# Patient Record
Sex: FEMALE | Race: WHITE | Employment: FULL TIME
[De-identification: names, ages, dates, MRNs, and addresses within clinical notes are randomized per-mention and may not be internally consistent; named-entity substitution may affect disease eponyms.]

---

## 2017-01-01 ENCOUNTER — PATIENT MESSAGE (OUTPATIENT)
Dept: INTERNAL MEDICINE | Facility: CLINIC | Age: 62
End: 2017-01-01

## 2017-01-03 ENCOUNTER — TELEPHONE (OUTPATIENT)
Dept: INTERNAL MEDICINE | Facility: CLINIC | Age: 62
End: 2017-01-03

## 2017-01-03 ENCOUNTER — PATIENT MESSAGE (OUTPATIENT)
Dept: INTERNAL MEDICINE | Facility: CLINIC | Age: 62
End: 2017-01-03

## 2017-01-09 ENCOUNTER — TELEPHONE (OUTPATIENT)
Dept: INTERNAL MEDICINE | Facility: CLINIC | Age: 62
End: 2017-01-09

## 2017-01-10 DIAGNOSIS — N30.00 ACUTE CYSTITIS WITHOUT HEMATURIA: Primary | ICD-10-CM

## 2017-01-15 ENCOUNTER — PATIENT MESSAGE (OUTPATIENT)
Dept: INTERNAL MEDICINE | Facility: CLINIC | Age: 62
End: 2017-01-15

## 2017-01-16 ENCOUNTER — TELEPHONE (OUTPATIENT)
Dept: INTERNAL MEDICINE | Facility: CLINIC | Age: 62
End: 2017-01-16

## 2017-01-16 ENCOUNTER — OFFICE VISIT (OUTPATIENT)
Dept: INTERNAL MEDICINE | Facility: CLINIC | Age: 62
End: 2017-01-16

## 2017-01-16 VITALS
SYSTOLIC BLOOD PRESSURE: 130 MMHG | OXYGEN SATURATION: 93 % | WEIGHT: 216.2 LBS | BODY MASS INDEX: 39.79 KG/M2 | HEIGHT: 62 IN | TEMPERATURE: 99.1 F | HEART RATE: 91 BPM | RESPIRATION RATE: 19 BRPM | DIASTOLIC BLOOD PRESSURE: 70 MMHG

## 2017-01-16 DIAGNOSIS — K58.9 IRRITABLE BOWEL SYNDROME, UNSPECIFIED TYPE: ICD-10-CM

## 2017-01-16 DIAGNOSIS — E78.00 HYPERCHOLESTEROLEMIA: ICD-10-CM

## 2017-01-16 DIAGNOSIS — J45.901: Primary | ICD-10-CM

## 2017-01-16 DIAGNOSIS — I10 ESSENTIAL HYPERTENSION: ICD-10-CM

## 2017-01-16 PROCEDURE — 99213 OFFICE O/P EST LOW 20 MIN: CPT | Performed by: FAMILY MEDICINE

## 2017-01-16 RX ORDER — BENZONATATE 100 MG/1
100 CAPSULE ORAL 3 TIMES DAILY PRN
Qty: 30 CAPSULE | Refills: 0 | Status: SHIPPED | OUTPATIENT
Start: 2017-01-16 | End: 2017-01-23

## 2017-01-16 RX ORDER — AZITHROMYCIN 250 MG/1
TABLET, FILM COATED ORAL
Qty: 1 PACKET | Refills: 0 | Status: SHIPPED | OUTPATIENT
Start: 2017-01-16 | End: 2017-08-07 | Stop reason: ALTCHOICE

## 2017-01-16 RX ORDER — PREDNISONE 20 MG/1
20 TABLET ORAL DAILY
Qty: 10 TABLET | Refills: 0 | Status: SHIPPED | OUTPATIENT
Start: 2017-01-16 | End: 2017-01-26

## 2017-01-16 ASSESSMENT — PATIENT HEALTH QUESTIONNAIRE - PHQ9
SUM OF ALL RESPONSES TO PHQ QUESTIONS 1-9: 0
2. FEELING DOWN, DEPRESSED OR HOPELESS: 0
1. LITTLE INTEREST OR PLEASURE IN DOING THINGS: 0
SUM OF ALL RESPONSES TO PHQ9 QUESTIONS 1 & 2: 0

## 2017-01-16 ASSESSMENT — ENCOUNTER SYMPTOMS
SHORTNESS OF BREATH: 1
SORE THROAT: 1
EYES NEGATIVE: 1
GASTROINTESTINAL NEGATIVE: 1
COUGH: 1

## 2017-01-19 ENCOUNTER — TELEPHONE (OUTPATIENT)
Dept: INTERNAL MEDICINE | Facility: CLINIC | Age: 62
End: 2017-01-19

## 2017-01-23 RX ORDER — ATENOLOL 50 MG/1
TABLET ORAL
Qty: 180 TABLET | Refills: 1 | Status: SHIPPED | OUTPATIENT
Start: 2017-01-23 | End: 2017-09-13 | Stop reason: SDUPTHER

## 2017-01-23 RX ORDER — ESCITALOPRAM OXALATE 10 MG/1
TABLET ORAL
Qty: 90 TABLET | Refills: 1 | Status: SHIPPED | OUTPATIENT
Start: 2017-01-23 | End: 2017-08-04 | Stop reason: SDUPTHER

## 2017-01-30 ENCOUNTER — TELEPHONE (OUTPATIENT)
Dept: INTERNAL MEDICINE | Facility: CLINIC | Age: 62
End: 2017-01-30

## 2017-01-30 DIAGNOSIS — N39.0 URINARY TRACT INFECTION, SITE UNSPECIFIED: Primary | ICD-10-CM

## 2017-01-30 RX ORDER — CIPROFLOXACIN 500 MG/1
500 TABLET, FILM COATED ORAL 2 TIMES DAILY
Qty: 14 TABLET | Refills: 0 | Status: SHIPPED | OUTPATIENT
Start: 2017-01-30 | End: 2017-02-06

## 2017-02-07 ENCOUNTER — TELEPHONE (OUTPATIENT)
Dept: INTERNAL MEDICINE | Facility: CLINIC | Age: 62
End: 2017-02-07

## 2017-02-07 DIAGNOSIS — N39.0 URINARY TRACT INFECTION WITHOUT HEMATURIA, SITE UNSPECIFIED: Primary | ICD-10-CM

## 2017-02-08 ENCOUNTER — TELEPHONE (OUTPATIENT)
Dept: INTERNAL MEDICINE | Facility: CLINIC | Age: 62
End: 2017-02-08

## 2017-02-13 ENCOUNTER — PATIENT MESSAGE (OUTPATIENT)
Dept: INTERNAL MEDICINE | Facility: CLINIC | Age: 62
End: 2017-02-13

## 2017-02-20 ENCOUNTER — TELEPHONE (OUTPATIENT)
Dept: INTERNAL MEDICINE | Facility: CLINIC | Age: 62
End: 2017-02-20

## 2017-02-24 ENCOUNTER — TELEPHONE (OUTPATIENT)
Dept: INTERNAL MEDICINE | Facility: CLINIC | Age: 62
End: 2017-02-24

## 2017-02-26 ENCOUNTER — PATIENT MESSAGE (OUTPATIENT)
Dept: INTERNAL MEDICINE | Facility: CLINIC | Age: 62
End: 2017-02-26

## 2017-03-06 ENCOUNTER — TELEPHONE (OUTPATIENT)
Dept: INTERNAL MEDICINE | Facility: CLINIC | Age: 62
End: 2017-03-06

## 2017-03-06 RX ORDER — GUAIFENESIN 600 MG/1
600 TABLET, EXTENDED RELEASE ORAL 2 TIMES DAILY
Qty: 30 TABLET | Refills: 0 | Status: SHIPPED | OUTPATIENT
Start: 2017-03-06 | End: 2017-08-07 | Stop reason: ALTCHOICE

## 2017-03-06 RX ORDER — MECLIZINE HCL 12.5 MG/1
12.5 TABLET ORAL 3 TIMES DAILY PRN
Qty: 15 TABLET | Refills: 0 | Status: SHIPPED | OUTPATIENT
Start: 2017-03-06 | End: 2017-03-16

## 2017-03-24 ENCOUNTER — HOSPITAL ENCOUNTER (OUTPATIENT)
Age: 62
Setting detail: SPECIMEN
Discharge: HOME | End: 2017-03-24
Payer: COMMERCIAL

## 2017-03-24 ENCOUNTER — TELEPHONE (OUTPATIENT)
Dept: INTERNAL MEDICINE CLINIC | Age: 62
End: 2017-03-24

## 2017-03-24 DIAGNOSIS — N30.00 ACUTE CYSTITIS WITHOUT HEMATURIA: ICD-10-CM

## 2017-03-24 DIAGNOSIS — N39.0 URINARY TRACT INFECTION WITHOUT HEMATURIA, SITE UNSPECIFIED: Primary | ICD-10-CM

## 2017-03-24 LAB
-: NORMAL
AMORPHOUS: NORMAL
BACTERIA: NORMAL
BILIRUBIN URINE: NEGATIVE
CASTS UA: NORMAL /LPF (ref 0–8)
COLOR: YELLOW
COMMENT UA: ABNORMAL
CRYSTALS, UA: NORMAL /HPF
EPITHELIAL CELLS UA: NORMAL /HPF (ref 0–5)
GLUCOSE URINE: NEGATIVE
KETONES, URINE: NEGATIVE
LEUKOCYTE ESTERASE, URINE: ABNORMAL
MUCUS: NORMAL
NITRITE, URINE: NEGATIVE
OTHER OBSERVATIONS UA: NORMAL
PH UA: 5.5 (ref 5–8)
PROTEIN UA: ABNORMAL
RBC UA: NORMAL /HPF (ref 0–4)
RENAL EPITHELIAL, UA: NORMAL /HPF
SPECIFIC GRAVITY UA: 1.02 (ref 1–1.03)
TRICHOMONAS: NORMAL
TURBIDITY: CLEAR
URINE HGB: ABNORMAL
UROBILINOGEN, URINE: NORMAL
WBC UA: NORMAL /HPF (ref 0–5)
YEAST: NORMAL

## 2017-03-25 ENCOUNTER — PATIENT MESSAGE (OUTPATIENT)
Dept: INTERNAL MEDICINE CLINIC | Age: 62
End: 2017-03-25

## 2017-03-25 LAB
CULTURE: NORMAL
CULTURE: NORMAL
Lab: NORMAL
SPECIMEN DESCRIPTION: NORMAL
STATUS: NORMAL

## 2017-05-10 ENCOUNTER — OFFICE VISIT (OUTPATIENT)
Dept: INTERNAL MEDICINE CLINIC | Age: 62
End: 2017-05-10
Payer: COMMERCIAL

## 2017-05-10 VITALS
HEART RATE: 78 BPM | DIASTOLIC BLOOD PRESSURE: 80 MMHG | BODY MASS INDEX: 40.3 KG/M2 | HEIGHT: 62 IN | WEIGHT: 219 LBS | TEMPERATURE: 97.7 F | SYSTOLIC BLOOD PRESSURE: 128 MMHG

## 2017-05-10 DIAGNOSIS — R07.81 RIB PAIN ON RIGHT SIDE: Primary | ICD-10-CM

## 2017-05-10 PROCEDURE — 99212 OFFICE O/P EST SF 10 MIN: CPT | Performed by: INTERNAL MEDICINE

## 2017-05-10 RX ORDER — PREDNISONE 10 MG/1
10 TABLET ORAL
Qty: 15 TABLET | Refills: 0 | Status: SHIPPED | OUTPATIENT
Start: 2017-05-10 | End: 2017-05-15

## 2017-05-10 RX ORDER — PREDNISONE 10 MG/1
10 TABLET ORAL
Qty: 15 TABLET | Refills: 0 | Status: SHIPPED | OUTPATIENT
Start: 2017-05-10 | End: 2017-05-10 | Stop reason: CLARIF

## 2017-05-11 ENCOUNTER — PATIENT MESSAGE (OUTPATIENT)
Dept: INTERNAL MEDICINE CLINIC | Age: 62
End: 2017-05-11

## 2017-05-11 ENCOUNTER — TELEPHONE (OUTPATIENT)
Dept: INTERNAL MEDICINE CLINIC | Age: 62
End: 2017-05-11

## 2017-05-11 RX ORDER — CYCLOBENZAPRINE HCL 10 MG
10 TABLET ORAL EVERY 8 HOURS PRN
Qty: 90 TABLET | Refills: 0 | Status: SHIPPED | OUTPATIENT
Start: 2017-05-11 | End: 2018-02-27 | Stop reason: SDUPTHER

## 2017-06-09 ENCOUNTER — TELEPHONE (OUTPATIENT)
Dept: INTERNAL MEDICINE CLINIC | Age: 62
End: 2017-06-09

## 2017-06-09 RX ORDER — CIPROFLOXACIN 500 MG/1
500 TABLET, FILM COATED ORAL 2 TIMES DAILY
Qty: 14 TABLET | Refills: 0 | Status: SHIPPED | OUTPATIENT
Start: 2017-06-09 | End: 2017-06-16

## 2017-06-12 RX ORDER — IBUPROFEN 800 MG/1
800 TABLET ORAL EVERY 8 HOURS PRN
Qty: 90 TABLET | Refills: 0 | Status: SHIPPED | OUTPATIENT
Start: 2017-06-12 | End: 2017-11-20 | Stop reason: SDUPTHER

## 2017-07-14 ENCOUNTER — TELEPHONE (OUTPATIENT)
Dept: INTERNAL MEDICINE CLINIC | Age: 62
End: 2017-07-14

## 2017-07-17 ENCOUNTER — TELEPHONE (OUTPATIENT)
Dept: INTERNAL MEDICINE CLINIC | Age: 62
End: 2017-07-17

## 2017-08-01 ENCOUNTER — PATIENT MESSAGE (OUTPATIENT)
Dept: INTERNAL MEDICINE CLINIC | Age: 62
End: 2017-08-01

## 2017-08-02 ENCOUNTER — TELEPHONE (OUTPATIENT)
Dept: INTERNAL MEDICINE CLINIC | Age: 62
End: 2017-08-02

## 2017-08-07 ENCOUNTER — OFFICE VISIT (OUTPATIENT)
Dept: OBGYN CLINIC | Age: 62
End: 2017-08-07
Payer: COMMERCIAL

## 2017-08-07 VITALS
DIASTOLIC BLOOD PRESSURE: 86 MMHG | SYSTOLIC BLOOD PRESSURE: 130 MMHG | BODY MASS INDEX: 39.51 KG/M2 | RESPIRATION RATE: 16 BRPM | WEIGHT: 223 LBS | HEART RATE: 76 BPM | HEIGHT: 63 IN | OXYGEN SATURATION: 100 %

## 2017-08-07 DIAGNOSIS — Z12.31 ENCOUNTER FOR SCREENING MAMMOGRAM FOR BREAST CANCER: ICD-10-CM

## 2017-08-07 DIAGNOSIS — Z01.419 WOMEN'S ANNUAL ROUTINE GYNECOLOGICAL EXAMINATION: Primary | ICD-10-CM

## 2017-08-07 PROCEDURE — 99396 PREV VISIT EST AGE 40-64: CPT | Performed by: OBSTETRICS & GYNECOLOGY

## 2017-08-07 RX ORDER — ESCITALOPRAM OXALATE 10 MG/1
TABLET ORAL
Qty: 90 TABLET | Refills: 0 | Status: SHIPPED | OUTPATIENT
Start: 2017-08-07 | End: 2017-11-28 | Stop reason: SDUPTHER

## 2017-09-05 ENCOUNTER — OFFICE VISIT (OUTPATIENT)
Dept: INTERNAL MEDICINE CLINIC | Age: 62
End: 2017-09-05
Payer: COMMERCIAL

## 2017-09-05 ENCOUNTER — HOSPITAL ENCOUNTER (OUTPATIENT)
Age: 62
Setting detail: SPECIMEN
Discharge: HOME | End: 2017-09-05
Payer: COMMERCIAL

## 2017-09-05 VITALS
SYSTOLIC BLOOD PRESSURE: 118 MMHG | TEMPERATURE: 98.1 F | HEIGHT: 63 IN | HEART RATE: 64 BPM | DIASTOLIC BLOOD PRESSURE: 80 MMHG | RESPIRATION RATE: 16 BRPM | WEIGHT: 222 LBS | BODY MASS INDEX: 39.34 KG/M2

## 2017-09-05 DIAGNOSIS — I10 ESSENTIAL HYPERTENSION: ICD-10-CM

## 2017-09-05 DIAGNOSIS — K21.9 GASTROESOPHAGEAL REFLUX DISEASE WITHOUT ESOPHAGITIS: ICD-10-CM

## 2017-09-05 DIAGNOSIS — N39.0 URINARY TRACT INFECTION WITHOUT HEMATURIA, SITE UNSPECIFIED: ICD-10-CM

## 2017-09-05 DIAGNOSIS — E04.9 THYROID GOITER: ICD-10-CM

## 2017-09-05 DIAGNOSIS — R05.9 COUGH: ICD-10-CM

## 2017-09-05 DIAGNOSIS — N39.0 URINARY TRACT INFECTION WITHOUT HEMATURIA, SITE UNSPECIFIED: Primary | ICD-10-CM

## 2017-09-05 PROCEDURE — 99214 OFFICE O/P EST MOD 30 MIN: CPT | Performed by: INTERNAL MEDICINE

## 2017-09-05 RX ORDER — PANTOPRAZOLE SODIUM 20 MG/1
20 TABLET, DELAYED RELEASE ORAL DAILY
COMMUNITY
End: 2018-01-24 | Stop reason: DRUGHIGH

## 2017-09-05 RX ORDER — FLUTICASONE PROPIONATE 50 MCG
2 SPRAY, SUSPENSION (ML) NASAL DAILY
Qty: 1 BOTTLE | Refills: 0 | Status: SHIPPED | OUTPATIENT
Start: 2017-09-05 | End: 2018-06-04 | Stop reason: CLARIF

## 2017-09-06 ENCOUNTER — TELEPHONE (OUTPATIENT)
Dept: INTERNAL MEDICINE CLINIC | Age: 62
End: 2017-09-06

## 2017-09-06 LAB
CULTURE: ABNORMAL
CULTURE: ABNORMAL
Lab: ABNORMAL
ORGANISM: ABNORMAL
SPECIMEN DESCRIPTION: ABNORMAL
STATUS: ABNORMAL

## 2017-09-06 RX ORDER — CIPROFLOXACIN 500 MG/1
500 TABLET, FILM COATED ORAL 2 TIMES DAILY
Qty: 14 TABLET | Refills: 0 | Status: SHIPPED | OUTPATIENT
Start: 2017-09-06 | End: 2017-09-13

## 2017-09-12 ENCOUNTER — OFFICE VISIT (OUTPATIENT)
Dept: INTERNAL MEDICINE CLINIC | Age: 62
End: 2017-09-12
Payer: COMMERCIAL

## 2017-09-12 VITALS
BODY MASS INDEX: 38.77 KG/M2 | SYSTOLIC BLOOD PRESSURE: 112 MMHG | WEIGHT: 218.8 LBS | HEART RATE: 72 BPM | HEIGHT: 63 IN | TEMPERATURE: 98 F | RESPIRATION RATE: 16 BRPM | DIASTOLIC BLOOD PRESSURE: 76 MMHG

## 2017-09-12 DIAGNOSIS — R59.0 AXILLARY LYMPHADENOPATHY: Primary | ICD-10-CM

## 2017-09-12 PROCEDURE — 99213 OFFICE O/P EST LOW 20 MIN: CPT | Performed by: INTERNAL MEDICINE

## 2017-09-12 RX ORDER — DOXYCYCLINE HYCLATE 100 MG
100 TABLET ORAL 2 TIMES DAILY
Qty: 20 TABLET | Refills: 0 | Status: SHIPPED | OUTPATIENT
Start: 2017-09-12 | End: 2017-09-22

## 2017-09-13 RX ORDER — ATENOLOL 50 MG/1
TABLET ORAL
Qty: 180 TABLET | Refills: 0 | Status: SHIPPED | OUTPATIENT
Start: 2017-09-13 | End: 2017-10-17 | Stop reason: CLARIF

## 2017-09-13 RX ORDER — ATENOLOL 50 MG/1
TABLET ORAL
Qty: 180 TABLET | Refills: 1 | Status: SHIPPED | OUTPATIENT
Start: 2017-09-13 | End: 2017-12-21 | Stop reason: SDUPTHER

## 2017-09-21 ENCOUNTER — TELEPHONE (OUTPATIENT)
Dept: INTERNAL MEDICINE CLINIC | Age: 62
End: 2017-09-21

## 2017-09-21 RX ORDER — PREDNISONE 10 MG/1
10 TABLET ORAL 3 TIMES DAILY
Qty: 15 TABLET | Refills: 0 | Status: SHIPPED | OUTPATIENT
Start: 2017-09-21 | End: 2017-09-26

## 2017-10-16 ENCOUNTER — HOSPITAL ENCOUNTER (OUTPATIENT)
Dept: MAMMOGRAPHY | Age: 62
Discharge: HOME | End: 2017-10-16
Payer: COMMERCIAL

## 2017-10-16 DIAGNOSIS — Z12.31 ENCOUNTER FOR SCREENING MAMMOGRAM FOR BREAST CANCER: ICD-10-CM

## 2017-10-16 DIAGNOSIS — Z01.419 WOMEN'S ANNUAL ROUTINE GYNECOLOGICAL EXAMINATION: ICD-10-CM

## 2017-10-16 PROCEDURE — 77063 BREAST TOMOSYNTHESIS BI: CPT

## 2017-10-17 ENCOUNTER — OFFICE VISIT (OUTPATIENT)
Dept: INTERNAL MEDICINE CLINIC | Age: 62
End: 2017-10-17
Payer: COMMERCIAL

## 2017-10-17 VITALS
HEIGHT: 63 IN | SYSTOLIC BLOOD PRESSURE: 138 MMHG | DIASTOLIC BLOOD PRESSURE: 70 MMHG | TEMPERATURE: 98.1 F | HEART RATE: 68 BPM | RESPIRATION RATE: 16 BRPM

## 2017-10-17 DIAGNOSIS — R59.0 AXILLARY LYMPHADENOPATHY: Primary | ICD-10-CM

## 2017-10-17 PROCEDURE — 99213 OFFICE O/P EST LOW 20 MIN: CPT | Performed by: INTERNAL MEDICINE

## 2017-10-17 NOTE — PROGRESS NOTES
Visit Information    Have you changed or started any medications since your last visit including any over-the-counter medicines, vitamins, or herbal medicines? no   Are you having any side effects from any of your medications? -  no  Have you stopped taking any of your medications? Is so, why? -  no    Have you seen any other physician or provider since your last visit? No  Have you had any other diagnostic tests since your last visit? Yes - Records Obtained  Have you been seen in the emergency room and/or had an admission to a hospital since we last saw you? No  Have you had your routine dental cleaning in the past 6 months? yes -     Have you activated your [employer] account? If not, what are your barriers?  Yes     Patient Care Team:  Hailee Flores MD as PCP - General    Medical History Review  Past Medical, Family, and Social History reviewed and does contribute to the patient presenting condition    Health Maintenance   Topic Date Due    Flu vaccine (1) 02/15/2018 (Originally 9/1/2017)    DTaP/Tdap/Td vaccine (1 - Tdap) 08/08/2018 (Originally 7/23/1975)    Pneumococcal med risk (1 of 1 - PPSV23) 08/18/2018 (Originally 7/23/1975)    Zostavax vaccine  09/05/2018 (Originally 7/23/2016)    HIV screen  09/05/2018 (Originally 7/23/1971)    Hepatitis C screen  12/12/2018 (Originally 1956)    Diabetes screen  09/02/2020 (Originally 9/3/2017)    Breast cancer screen  09/24/2018    Cervical cancer screen  01/21/2019    Lipid screen  12/30/2021    Colon cancer screen colonoscopy  08/18/2023

## 2017-10-21 NOTE — PROGRESS NOTES
Mikael Hughes is a 65 y.o. female who presents for   Chief Complaint   Patient presents with    Results     noticed lump in right axilla, had mammogram yesterday- results in EPIC    and follow up of chronic medical problems. Patient Active Problem List   Diagnosis    Tachycardia    HTN (hypertension)    Fibromyalgia    Chronic UTI    IBS (irritable bowel syndrome)    Obesity    Hypercholesterolemia    Chondromalacia patellae of right knee    Patellar contusion     HPI  Here for follow-up on the axillary lump and feels good  Current Outpatient Prescriptions   Medication Sig Dispense Refill    atenolol (TENORMIN) 50 MG tablet TAKE ONE TABLET BY MOUTH TWICE A  tablet 1    pantoprazole (PROTONIX) 20 MG tablet Take 20 mg by mouth daily      fluticasone (FLONASE) 50 MCG/ACT nasal spray 2 sprays by Nasal route daily 1 Bottle 0    escitalopram (LEXAPRO) 10 MG tablet TAKE 1 TABLET EVERY DAY 90 tablet 0    estradiol (ESTRING) 2 MG vaginal ring Place 2 mg vaginally once for 1 dose Follow package directions. 1 each 3    ibuprofen (ADVIL;MOTRIN) 800 MG tablet Take 1 tablet by mouth every 8 hours as needed for Pain 90 tablet 0    spironolactone (ALDACTONE) 50 MG tablet Take 50 mg by mouth daily        No current facility-administered medications for this visit.         Allergies   Allergen Reactions    Bactrim [Sulfamethoxazole-Trimethoprim] Hives    Macrobid [Nitrofurantoin] Other (See Comments)     Numb lips    Sulfa Antibiotics     Morphine Nausea And Vomiting       Past Medical History:   Diagnosis Date    Anxiety     Chronic UTI     Fibromyalgia     Hematuria due to chronic cystitis     HTN (hypertension)     IBS (irritable bowel syndrome)     Obesity     Tachycardia     Thyroid disease     nodules, hurshimoto disease    Unspecified sleep apnea     does not use machine       Past Surgical History:   Procedure Laterality Date    ABDOMEN SURGERY      abdominoplasty 2002    BREAST REDUCTION SURGERY      CHOLECYSTECTOMY      COLONOSCOPY      CYST REMOVAL Bilateral     feet    FOOT TENDON SURGERY Left 12/2/15    HYSTERECTOMY      with BSO    TONSILLECTOMY AND ADENOIDECTOMY         Family History   Problem Relation Age of Onset    Heart Disease Mother    [facility] Parkinsonism Mother     Hypertension Mother    [facility] Cancer Sister      cervical/cervical 80s    Hypertension Sister     High Blood Pressure Other     Hypertension Brother     Elevated Lipids Brother     Other Brother      stents     ROS   Constitutional:  Negative for fatigue, loss of appetite and unexpected weight change   HEENT            : Negative for neck stiffness and pain, no congestion or sinus pressure   Eyes                : No visual disturbance or pain   Cardiovascular: No chest pain or palpitations or leg swelling   Respiratory      : Negative for cough, shortness of breath or wheezing   Gastrointestinal: Negative for abdominal pain, constipation or diarrhea and bloating No nausea or vomiting   Genitourinary:     No urgency or frequency, no burning or hematuria   Musculoskeletal: No arthralgias, back pain or myalgias   Skin                  : Negative for rash or erythema   Neurological    : Negative for dizziness, weakness, tremors ,light headedness or syncope   Psychiatric       : Negative for dysphoric mood, sleep disturbances, nervous or anxious, or decreased concentration   All other review of systems was negative    Objective  Physical Examination:    Nursing note reviewed    /70 (Site: Left Arm, Position: Sitting, Cuff Size: Large Adult)   Pulse 68   Temp 98.1 °F (36.7 °C) (Oral)   Resp 16   Ht 5' 2.5\" (1.588 m)   BP Readings from Last 3 Encounters:   10/17/17 138/70   09/12/17 112/76   09/05/17 118/80         Constitutional:  Elida Klinefelter is oriented to place, person and time ,appears well-developed and well-nourished  HEENT:  Atraumatic and normocephalic, external ears normal bilaterally,

## 2017-10-29 ENCOUNTER — PATIENT MESSAGE (OUTPATIENT)
Dept: INTERNAL MEDICINE CLINIC | Age: 62
End: 2017-10-29

## 2017-10-30 ENCOUNTER — TELEPHONE (OUTPATIENT)
Dept: INTERNAL MEDICINE CLINIC | Age: 62
End: 2017-10-30

## 2017-10-30 RX ORDER — PREDNISONE 10 MG/1
10 TABLET ORAL 3 TIMES DAILY
Qty: 15 TABLET | Refills: 0 | Status: SHIPPED | OUTPATIENT
Start: 2017-10-30 | End: 2017-11-09

## 2017-10-30 NOTE — TELEPHONE ENCOUNTER
Doctor K,   My Larslilia Quachmitz has returned. Right side from buttocks down to foot. Using ibprophen but not working. .   Any suggestions for this nerve pain? At times it over baring.    Dawna Rendon

## 2017-11-27 ENCOUNTER — PATIENT MESSAGE (OUTPATIENT)
Dept: INTERNAL MEDICINE CLINIC | Age: 62
End: 2017-11-27

## 2017-11-29 RX ORDER — ESCITALOPRAM OXALATE 10 MG/1
TABLET ORAL
Qty: 90 TABLET | Refills: 0 | Status: SHIPPED | OUTPATIENT
Start: 2017-11-29 | End: 2018-03-17 | Stop reason: SDUPTHER

## 2017-12-21 ENCOUNTER — OFFICE VISIT (OUTPATIENT)
Dept: INTERNAL MEDICINE CLINIC | Age: 62
End: 2017-12-21
Payer: COMMERCIAL

## 2017-12-21 VITALS
HEART RATE: 81 BPM | HEIGHT: 63 IN | OXYGEN SATURATION: 97 % | BODY MASS INDEX: 40.04 KG/M2 | RESPIRATION RATE: 15 BRPM | TEMPERATURE: 97.4 F | WEIGHT: 226 LBS | DIASTOLIC BLOOD PRESSURE: 85 MMHG | SYSTOLIC BLOOD PRESSURE: 130 MMHG

## 2017-12-21 DIAGNOSIS — M54.6 ACUTE RIGHT-SIDED THORACIC BACK PAIN: Primary | ICD-10-CM

## 2017-12-21 PROCEDURE — 99213 OFFICE O/P EST LOW 20 MIN: CPT | Performed by: INTERNAL MEDICINE

## 2017-12-21 RX ORDER — PREDNISONE 10 MG/1
10 TABLET ORAL
Qty: 15 TABLET | Refills: 0 | Status: SHIPPED | OUTPATIENT
Start: 2017-12-21 | End: 2017-12-26

## 2017-12-21 RX ORDER — ATENOLOL 50 MG/1
TABLET ORAL
Qty: 180 TABLET | Refills: 1 | Status: SHIPPED | OUTPATIENT
Start: 2017-12-21 | End: 2018-07-03 | Stop reason: SDUPTHER

## 2017-12-21 RX ORDER — OMEPRAZOLE 20 MG/1
20 CAPSULE, DELAYED RELEASE ORAL DAILY
COMMUNITY
End: 2018-06-04 | Stop reason: CLARIF

## 2017-12-21 NOTE — PROGRESS NOTES
No results found for: INR, PROTIME  Lab Results   Component Value Date    GLUCOSE 146 (H) 12/30/2016    CREATININE 0.97 (H) 12/30/2016    BUN 28 (H) 12/30/2016     12/30/2016    K 4.4 12/30/2016     12/30/2016    CO2 26 12/30/2016     Lab Results   Component Value Date    ALT 49 (H) 12/30/2016    AST 32 (H) 12/30/2016    ALKPHOS 58 12/30/2016    BILITOT 0.51 12/30/2016     Lab Results   Component Value Date    LABPROT 7.5 11/28/2012    LABALBU 4.4 12/30/2016     Lab Results   Component Value Date    TSH 0.77 06/10/2016     Assessment:  1. Acute right-sided thoracic back pain        Plan:  Prednisone 10 mg 3 times a day given for 5 days  Advised to apply warm compresses  Continue muscle relaxers as before  Call back next week if no improvement           1. [employer] received counseling on the following healthy behaviors: nutrition and exercise    2. Prior labs and health maintenance reviewed. 3.  Discussed use, benefit, and side effects of prescribed medications. Barriers to medication compliance addressed. All her questions were answered. Pt voiced understanding. Kusum Sen will continue current medications, diet and exercise. Orders Placed This Encounter   Medications    atenolol (TENORMIN) 50 MG tablet     Sig: TAKE ONE TABLET BY MOUTH TWICE A DAY     Dispense:  180 tablet     Refill:  1    predniSONE (DELTASONE) 10 MG tablet     Sig: Take 1 tablet by mouth 3 times daily (with meals) for 5 days     Dispense:  15 tablet     Refill:  0          Completed Refills               Requested Prescriptions     Signed Prescriptions Disp Refills    atenolol (TENORMIN) 50 MG tablet 180 tablet 1     Sig: TAKE ONE TABLET BY MOUTH TWICE A DAY    predniSONE (DELTASONE) 10 MG tablet 15 tablet 0     Sig: Take 1 tablet by mouth 3 times daily (with meals) for 5 days     4. Patient given educational materials - see patient instructions    5.  Was a self-tracking handout given in paper form or via MyChart? NO    No orders of the defined types were placed in this encounter. No Follow-up on file. Patient voiced understanding and agreed to treatment plan. Electronically signed by Gokul Salazar MD on 12/21/2017 at 4:09 PM    This note is created with a voice recognition program and while intend to generate a document that accurately reflects the content of the visit, no guarantee can be provided that every mistake has been identified and corrected by editing.

## 2017-12-21 NOTE — PROGRESS NOTES
Chronic Disease Visit Information    BP Readings from Last 3 Encounters:   10/17/17 138/70   09/12/17 112/76   09/05/17 118/80          Hemoglobin A1C (%)   Date Value   09/03/2014 5.4     LDL Cholesterol (mg/dL)   Date Value   12/30/2016 116     HDL (mg/dL)   Date Value   12/30/2016 43     BUN (mg/dL)   Date Value   12/30/2016 28 (H)     CREATININE (mg/dL)   Date Value   12/30/2016 0.97 (H)     Glucose (mg/dL)   Date Value   12/30/2016 146 (H)   06/02/2012 96            Have you changed or started any medications since your last visit including any over-the-counter medicines, vitamins, or herbal medicines? no   Are you having any side effects from any of your medications? -  no  Have you stopped taking any of your medications? Is so, why? -  no    Have you seen any other physician or provider since your last visit? Yes -   Have you had any other diagnostic tests since your last visit? No  Have you been seen in the emergency room and/or had an admission to a hospital since we last saw you? No  Have you had your annual diabetic retinal (eye) exam? No  Have you had your routine dental cleaning in the past 6 months? yes  Have you activated your [employer] account? If not, what are your barriers?  Yes     Patient Care Team:  Cricket Sanchez MD as PCP - General         Medical History Review  Past Medical, Family, and Social History reviewed and does contribute to the patient presenting condition    Health Maintenance   Topic Date Due    Flu vaccine (1) 02/15/2018 (Originally 9/1/2017)    DTaP/Tdap/Td vaccine (1 - Tdap) 08/08/2018 (Originally 7/23/1975)    Pneumococcal med risk (1 of 1 - PPSV23) 08/18/2018 (Originally 7/23/1975)    Zostavax vaccine  09/05/2018 (Originally 7/23/2016)    HIV screen  09/05/2018 (Originally 7/23/1971)    Hepatitis C screen  12/12/2018 (Originally 1956)    Diabetes screen  09/02/2020 (Originally 9/3/2017)    Cervical cancer screen  01/21/2019    Breast cancer screen  10/16/2019  Lipid screen  12/30/2021    Colon cancer screen colonoscopy  08/18/2023

## 2017-12-22 ENCOUNTER — TELEPHONE (OUTPATIENT)
Dept: INTERNAL MEDICINE CLINIC | Age: 62
End: 2017-12-22

## 2017-12-22 DIAGNOSIS — M54.6 ACUTE RIGHT-SIDED THORACIC BACK PAIN: Primary | ICD-10-CM

## 2017-12-23 ENCOUNTER — HOSPITAL ENCOUNTER (OUTPATIENT)
Age: 62
Discharge: HOME | End: 2017-12-23
Payer: COMMERCIAL

## 2017-12-23 ENCOUNTER — HOSPITAL ENCOUNTER (OUTPATIENT)
Dept: GENERAL RADIOLOGY | Age: 62
Discharge: HOME | End: 2017-12-23
Payer: COMMERCIAL

## 2017-12-23 DIAGNOSIS — M54.6 ACUTE RIGHT-SIDED THORACIC BACK PAIN: ICD-10-CM

## 2017-12-23 PROCEDURE — 72070 X-RAY EXAM THORAC SPINE 2VWS: CPT

## 2017-12-23 PROCEDURE — 71020 XR CHEST STANDARD TWO VW: CPT

## 2017-12-26 ENCOUNTER — PATIENT MESSAGE (OUTPATIENT)
Dept: INTERNAL MEDICINE CLINIC | Age: 62
End: 2017-12-26

## 2017-12-26 ENCOUNTER — TELEPHONE (OUTPATIENT)
Dept: INTERNAL MEDICINE CLINIC | Age: 62
End: 2017-12-26

## 2017-12-26 DIAGNOSIS — M54.6 PAIN IN THORACIC SPINE: Primary | ICD-10-CM

## 2017-12-26 DIAGNOSIS — M54.6 ACUTE RIGHT-SIDED THORACIC BACK PAIN: Primary | ICD-10-CM

## 2017-12-26 RX ORDER — HYDROCODONE BITARTRATE AND ACETAMINOPHEN 5; 325 MG/1; MG/1
1 TABLET ORAL EVERY 6 HOURS PRN
Qty: 28 TABLET | Refills: 0 | Status: SHIPPED | OUTPATIENT
Start: 2017-12-26 | End: 2022-02-07 | Stop reason: SDUPTHER

## 2017-12-26 NOTE — TELEPHONE ENCOUNTER
CT of the chest ordered to evaluate for upper back pain  Also Norco ordered and patient has taken them in the past  Advise patient to go to the emergency room with symptoms not any better

## 2017-12-26 NOTE — TELEPHONE ENCOUNTER
Chest x-ray was normal  Thoracic spine x-ray shows degenerative disease is no acute fracture  Is patient feeling better

## 2017-12-27 NOTE — TELEPHONE ENCOUNTER
From: Bailey Sewell  To: Ramya Murray MD  Sent: 12/26/2017 6:18 PM EST  Subject: Non-Urgent Medical Question    I can not take the contrast with my cat scan on Friday. I have a reaction to that dye. Can you please revise.

## 2017-12-29 ENCOUNTER — TELEPHONE (OUTPATIENT)
Dept: INTERNAL MEDICINE CLINIC | Age: 62
End: 2017-12-29

## 2017-12-29 ENCOUNTER — HOSPITAL ENCOUNTER (OUTPATIENT)
Dept: CT IMAGING | Age: 62
Discharge: HOME | End: 2017-12-29
Payer: COMMERCIAL

## 2017-12-29 DIAGNOSIS — M54.6 ACUTE RIGHT-SIDED THORACIC BACK PAIN: ICD-10-CM

## 2017-12-29 RX ORDER — AMOXICILLIN 500 MG/1
500 CAPSULE ORAL 3 TIMES DAILY
Qty: 21 CAPSULE | Refills: 0 | Status: SHIPPED | OUTPATIENT
Start: 2017-12-29 | End: 2018-01-05

## 2018-01-04 ENCOUNTER — HOSPITAL ENCOUNTER (OUTPATIENT)
Dept: CT IMAGING | Age: 63
Discharge: HOME | End: 2018-01-04
Payer: COMMERCIAL

## 2018-01-04 DIAGNOSIS — M54.6 PAIN IN THORACIC SPINE: ICD-10-CM

## 2018-01-04 PROCEDURE — 71250 CT THORAX DX C-: CPT

## 2018-01-11 ENCOUNTER — TELEPHONE (OUTPATIENT)
Dept: INTERNAL MEDICINE CLINIC | Age: 63
End: 2018-01-11

## 2018-01-11 RX ORDER — AZITHROMYCIN 250 MG/1
TABLET, FILM COATED ORAL
Qty: 1 PACKET | Refills: 0 | Status: SHIPPED | OUTPATIENT
Start: 2018-01-11 | End: 2018-01-24 | Stop reason: ALTCHOICE

## 2018-01-19 ENCOUNTER — PATIENT MESSAGE (OUTPATIENT)
Dept: INTERNAL MEDICINE CLINIC | Age: 63
End: 2018-01-19

## 2018-01-22 NOTE — TELEPHONE ENCOUNTER
From: Kit Casillas  To: Shiela Vázquez MD  Sent: 1/19/2018 8:00 PM EST  Subject: Non-Urgent Medical Question    Dr. Lissett Borrego,   I know this flu and Upper respiratory infections are wide spread. However, I have been on two cycles of antibiotics. Went through two pkg of mucinex dm, one pkg of Alkaselzter cold and cough. Even tried those pearly cough meds you gave me. It's been 4 weeks and I am still coughing. Very little sinus drainage which is clear. I need to do something about this cough which feels all bronchial. How about a nebulizer? Please help ? ?

## 2018-01-24 ENCOUNTER — OFFICE VISIT (OUTPATIENT)
Dept: INTERNAL MEDICINE CLINIC | Age: 63
End: 2018-01-24
Payer: COMMERCIAL

## 2018-01-24 VITALS
BODY MASS INDEX: 38.98 KG/M2 | HEART RATE: 76 BPM | RESPIRATION RATE: 15 BRPM | OXYGEN SATURATION: 91 % | HEIGHT: 63 IN | TEMPERATURE: 98.1 F | SYSTOLIC BLOOD PRESSURE: 126 MMHG | WEIGHT: 220 LBS | DIASTOLIC BLOOD PRESSURE: 74 MMHG

## 2018-01-24 DIAGNOSIS — J40 BRONCHITIS: Primary | ICD-10-CM

## 2018-01-24 PROCEDURE — 94640 AIRWAY INHALATION TREATMENT: CPT | Performed by: INTERNAL MEDICINE

## 2018-01-24 PROCEDURE — 96372 THER/PROPH/DIAG INJ SC/IM: CPT | Performed by: INTERNAL MEDICINE

## 2018-01-24 PROCEDURE — 99213 OFFICE O/P EST LOW 20 MIN: CPT | Performed by: INTERNAL MEDICINE

## 2018-01-24 RX ORDER — ALBUTEROL SULFATE 90 UG/1
2 AEROSOL, METERED RESPIRATORY (INHALATION) EVERY 6 HOURS PRN
Qty: 1 INHALER | Refills: 0 | Status: SHIPPED | OUTPATIENT
Start: 2018-01-24 | End: 2018-06-04 | Stop reason: CLARIF

## 2018-01-24 RX ORDER — PREDNISONE 10 MG/1
10 TABLET ORAL
Qty: 15 TABLET | Refills: 0 | Status: SHIPPED | OUTPATIENT
Start: 2018-01-24 | End: 2018-01-29

## 2018-01-24 RX ORDER — ALBUTEROL SULFATE 2.5 MG/3ML
2.5 SOLUTION RESPIRATORY (INHALATION) ONCE
Status: COMPLETED | OUTPATIENT
Start: 2018-01-24 | End: 2018-01-24

## 2018-01-24 RX ORDER — CEFTRIAXONE 1 G/1
1 INJECTION, POWDER, FOR SOLUTION INTRAMUSCULAR; INTRAVENOUS ONCE
Status: COMPLETED | OUTPATIENT
Start: 2018-01-24 | End: 2018-01-24

## 2018-01-24 RX ORDER — CEFUROXIME AXETIL 500 MG/1
500 TABLET ORAL 2 TIMES DAILY
Qty: 20 TABLET | Refills: 0 | Status: SHIPPED | OUTPATIENT
Start: 2018-01-24 | End: 2018-02-03

## 2018-01-24 RX ORDER — LIDOCAINE HYDROCHLORIDE 10 MG/ML
2 INJECTION, SOLUTION EPIDURAL; INFILTRATION; INTRACAUDAL; PERINEURAL ONCE
Status: COMPLETED | OUTPATIENT
Start: 2018-01-24 | End: 2018-01-24

## 2018-01-24 RX ADMIN — CEFTRIAXONE 1 G: 1 INJECTION, POWDER, FOR SOLUTION INTRAMUSCULAR; INTRAVENOUS at 14:48

## 2018-01-24 RX ADMIN — LIDOCAINE HYDROCHLORIDE 2 ML: 10 INJECTION, SOLUTION EPIDURAL; INFILTRATION; INTRACAUDAL; PERINEURAL at 16:57

## 2018-01-24 RX ADMIN — ALBUTEROL SULFATE 2.5 MG: 2.5 SOLUTION RESPIRATORY (INHALATION) at 14:11

## 2018-01-24 ASSESSMENT — PATIENT HEALTH QUESTIONNAIRE - PHQ9
2. FEELING DOWN, DEPRESSED OR HOPELESS: 0
1. LITTLE INTEREST OR PLEASURE IN DOING THINGS: 0
SUM OF ALL RESPONSES TO PHQ9 QUESTIONS 1 & 2: 0
SUM OF ALL RESPONSES TO PHQ QUESTIONS 1-9: 0

## 2018-01-24 NOTE — PROGRESS NOTES
negative    Objective  Physical Examination:    Nursing note reviewed    /74 (Site: Left Arm, Position: Sitting, Cuff Size: Large Adult)   Pulse 76   Temp 98.1 °F (36.7 °C) (Oral)   Resp 15   Ht 5' 2.99\" (1.6 m)   Wt 220 lb (99.8 kg)   SpO2 91%   Breastfeeding?  No   BMI 38.98 kg/m²   BP Readings from Last 3 Encounters:   01/24/18 126/74   12/21/17 130/85   10/17/17 138/70         Constitutional:  Jamee Rivero is oriented to place, person and time ,appears well-developed and well-nourished  HEENT:  Atraumatic and normocephalic, external ears normal bilaterally, nose normal no oropharyngeal exudate and is clear and moist  Eyes:  EOCM normal; conjunctivae normal; PERRLA bilaterally  Neck:  Normal range of motion, neck supple, no JVD and no thyromegaly  Cardiovascular:  RRR, normal heart sounds and intact distal pulses  Pulmonary:  effort normal and breath sounds normal bilaterally,Positive for wheezes or rales on the right side, no respiratory distress  Abdominal:  Soft, non-tender; normal bowel sounds, no masses  Musculoskeletal:  Normal range of motion and no edema or tenderness bilaterally  No lymphadenopathy  Neurological:  alert, oriented, and normal reflexes bilaterally  Skin: warm and dry  Psychiatric:  normal mood and effect; behavior normal.    Labs:   Lab Results   Component Value Date    LABA1C 5.4 09/03/2014     Lab Results   Component Value Date    CHOL 204 (H) 12/30/2016     Lab Results   Component Value Date    HDL 43 12/30/2016     No results found for: [address]  Lab Results   Component Value Date    TRIG 226 (H) 12/30/2016     No components found for: [place], [place]  Lab Results   Component Value Date    WBC 11.2 (H) 09/19/2016    HGB 13.2 09/19/2016    HCT 40.3 09/19/2016    MCV 85.6 09/19/2016     09/19/2016     No results found for: INR, PROTIME  Lab Results   Component Value Date    GLUCOSE 146 (H) 12/30/2016    CREATININE 0.97 (H) 12/30/2016    BUN 28 (H) 12/30/2016     12/30/2016 editing.

## 2018-02-05 ENCOUNTER — TELEPHONE (OUTPATIENT)
Dept: INTERNAL MEDICINE CLINIC | Age: 63
End: 2018-02-05

## 2018-02-05 NOTE — TELEPHONE ENCOUNTER
Pt is calling stating she is feeling better however her cough is still very deep.      States she still has inhaler left and its  Been helping but she cannot get rid of the deep cough she has    Pt states she does NOT want tessalon pearles

## 2018-02-14 ENCOUNTER — TELEPHONE (OUTPATIENT)
Dept: INTERNAL MEDICINE CLINIC | Age: 63
End: 2018-02-14

## 2018-02-14 ENCOUNTER — PATIENT MESSAGE (OUTPATIENT)
Dept: INTERNAL MEDICINE CLINIC | Age: 63
End: 2018-02-14

## 2018-02-14 DIAGNOSIS — N39.0 URINARY TRACT INFECTION WITHOUT HEMATURIA, SITE UNSPECIFIED: Primary | ICD-10-CM

## 2018-02-15 ENCOUNTER — TELEPHONE (OUTPATIENT)
Dept: INTERNAL MEDICINE CLINIC | Age: 63
End: 2018-02-15

## 2018-02-15 RX ORDER — CIPROFLOXACIN 500 MG/1
500 TABLET, FILM COATED ORAL 2 TIMES DAILY
Qty: 14 TABLET | Refills: 0 | Status: SHIPPED | OUTPATIENT
Start: 2018-02-15 | End: 2018-02-25

## 2018-02-16 ENCOUNTER — HOSPITAL ENCOUNTER (OUTPATIENT)
Age: 63
Setting detail: SPECIMEN
Discharge: HOME | End: 2018-02-16
Payer: COMMERCIAL

## 2018-02-16 LAB
-: ABNORMAL
AMORPHOUS: ABNORMAL
BACTERIA: ABNORMAL
BILIRUBIN URINE: NEGATIVE
CASTS UA: ABNORMAL /LPF (ref 0–2)
COLOR: YELLOW
COMMENT UA: ABNORMAL
CRYSTALS, UA: ABNORMAL /HPF
EPITHELIAL CELLS UA: ABNORMAL /HPF (ref 0–5)
GLUCOSE URINE: NEGATIVE
KETONES, URINE: ABNORMAL
LEUKOCYTE ESTERASE, URINE: ABNORMAL
MUCUS: ABNORMAL
NITRITE, URINE: NEGATIVE
OTHER OBSERVATIONS UA: ABNORMAL
PH UA: 5.5 (ref 5–8)
PROTEIN UA: ABNORMAL
RBC UA: ABNORMAL /HPF (ref 0–2)
RENAL EPITHELIAL, UA: ABNORMAL /HPF
SPECIFIC GRAVITY UA: 1.02 (ref 1–1.03)
TRICHOMONAS: ABNORMAL
TURBIDITY: ABNORMAL
URINE HGB: ABNORMAL
UROBILINOGEN, URINE: NORMAL
WBC UA: ABNORMAL /HPF (ref 0–5)
YEAST: ABNORMAL

## 2018-02-17 LAB
CULTURE: NO GROWTH
CULTURE: NORMAL
Lab: NORMAL
SPECIMEN DESCRIPTION: NORMAL
STATUS: NORMAL

## 2018-02-27 RX ORDER — CYCLOBENZAPRINE HCL 10 MG
10 TABLET ORAL EVERY 8 HOURS PRN
Qty: 90 TABLET | Refills: 0 | Status: SHIPPED | OUTPATIENT
Start: 2018-02-27 | End: 2018-12-14 | Stop reason: SDUPTHER

## 2018-03-13 ENCOUNTER — TELEPHONE (OUTPATIENT)
Dept: INTERNAL MEDICINE CLINIC | Age: 63
End: 2018-03-13

## 2018-03-13 ENCOUNTER — PATIENT MESSAGE (OUTPATIENT)
Dept: INTERNAL MEDICINE CLINIC | Age: 63
End: 2018-03-13

## 2018-03-13 RX ORDER — PREDNISONE 10 MG/1
10 TABLET ORAL 3 TIMES DAILY
Qty: 15 TABLET | Refills: 0 | Status: SHIPPED | OUTPATIENT
Start: 2018-03-13 | End: 2018-03-18

## 2018-03-13 NOTE — TELEPHONE ENCOUNTER
Pt notified     mucinexc 600mg bid and prednisone 10mg TID for 5 days    Sent to [facility] as directed Dr Michael Woods    Pt said the mucous was reaaly green when she did cough some up today

## 2018-03-14 ENCOUNTER — TELEPHONE (OUTPATIENT)
Dept: INTERNAL MEDICINE CLINIC | Age: 63
End: 2018-03-14

## 2018-03-14 RX ORDER — AZITHROMYCIN 250 MG/1
TABLET, FILM COATED ORAL
Qty: 1 PACKET | Refills: 0 | Status: SHIPPED | OUTPATIENT
Start: 2018-03-14 | End: 2018-03-24

## 2018-03-14 NOTE — TELEPHONE ENCOUNTER
After Visit Summary   3/14/2018    Dung Norton    MRN: [id number]           Patient Information     Date Of Birth          1942        Visit Information        Provider Department      3/14/2018 11:15 AM Tamiko Vanegas MD [facility] and [facility]rolog[facility]        Care Instructions    Please follow up in 6 weeks with a flow/PVR  Please come to this appointment with a full bladder for a flow/PVR    It was a pleasure meeting with you today.  Thank you for allowing me and my team the privilege of caring for you today.  YOU are the reason we are here, and I truly hope we provided you with the excellent service you deserve.  Please let us know if there is anything else we can do for you so that we can be sure you are leaving completely satisfied with your care experience.                  Follow-ups after your visit        Your next 10 appointments already scheduled     Mar 19, 2018 10:15 AM CDT   [facility] with  [facility]    [address] [place] [id number] [phone]           Please do not eat 10-12 hours before your appointment if you are coming in fasting for labs on lipids, cholesterol, or glucose (sugar). This does not apply to pregnant women. Water, hot tea and black coffee (with nothing added) are okay. Do not drink other fluids, diet soda or chew gum.            Apr 13, 2018  1:00 PM CDT   (Arrive by 12:30 PM)   Return Visit with Oralia De La Torre NP   [facility] Nephrology ([facility])    [address]  [address]  [facility] [id number] [phone]            Apr 27, 2018 11:00 AM CDT   (Arrive by 10:45 AM)   Return Visit with Tamiko Vanegas MD   [facility] and [facility]rolog[facility] ([facility])    [address]  [address]  [facility] [id number] [phone]              Who to contact  Health Maintenance   Topic Date Due    Shingles Vaccine (1 of 2 - 2 Dose Series) 07/23/2006    Flu vaccine (1) 09/01/2017    Potassium monitoring  12/30/2017    Creatinine monitoring  12/30/2017    DTaP/Tdap/Td vaccine (1 - Tdap) 08/08/2018 (Originally 7/23/1975)    HIV screen  09/05/2018 (Originally 7/23/1971)    Hepatitis C screen  12/12/2018 (Originally 1956)    Diabetes screen  09/02/2020 (Originally 9/3/2017)    Cervical cancer screen  01/21/2019    Breast cancer screen  10/16/2019    Lipid screen  12/30/2021    Colon cancer screen colonoscopy  08/18/2023             (applicable per patient's age: Cancer Screenings, Depression Screening, Fall Risk Screening, Immunizations)    Hemoglobin A1C (%)   Date Value   09/03/2014 5.4     LDL Cholesterol (mg/dL)   Date Value   12/30/2016 116     AST (U/L)   Date Value   12/30/2016 32 (H)     ALT (U/L)   Date Value   12/30/2016 49 (H)     BUN (mg/dL)   Date Value   12/30/2016 28 (H)      (goal A1C is < 7)   (goal LDL is <100) need 30-50% reduction from baseline     BP Readings from Last 3 Encounters:   01/24/18 126/74   12/21/17 130/85   10/17/17 138/70    (goal /80)      All Future Testing planned in [facility]:  Lab Frequency Next Occurrence       Next Visit Date:  No future appointments.          Patient Active Problem List:     Tachycardia     HTN (hypertension)     Fibromyalgia     Chronic UTI     IBS (irritable bowel syndrome)     Obesity     Hypercholesterolemia     Chondromalacia patellae of right knee     Patellar contusion     Please call your clinic at [phone] to:    Ask questions about your health    Make or cancel appointments    Discuss your medicines    Learn about your test results    Speak to your doctor            Additional Information About Your Visit        MyChart Information     Narvii is an electronic gateway that provides easy, online access to your medical records. With Narvii, you can request a clinic appointment, read your test results, renew a prescription or communicate with your care team.     To sign up for Narvii visit the website at [email or web address]   You will be asked to enter the access code listed below, as well as some personal information. Please follow the directions to create your username and password.     Your access code is: [email or web address]  Expires: 4/15/2018 12:20 PM     Your access code will  in 90 days. If you need help or a new code, please contact your [facility] or call [phone] for assistance.        Care EveryWhere ID     This is your Care EveryWhere ID. This could be used by other organizations to access your [place] medical records  [id number]        Your Vitals Were     Pulse Respirations BMI (Body Mass Index)             79 16 24.54 kg/m2          Blood Pressure from Last 3 Encounters:   18 151/89   18 144/84   18 148/83    Weight from Last 3 Encounters:   18 77.6 kg (171 lb)   18 76.7 kg (169 lb 3.2 oz)   18 77.1 kg (169 lb 15.6 oz)              Today, you had the following     No orders found for display         Today's Medication Changes          These changes are accurate as of 3/14/18 12:49 PM.  If you have any questions, ask your nurse or doctor.               These medicines have changed or have updated prescriptions.        Dose/Directions    amLODIPine 2.5 MG tablet   Commonly known as:  NORVASC   This may have changed:  when to take this   Used for:  Renal hypertension        Dose:   2.5 mg   Take 1 tablet (2.5 mg) by mouth daily   Quantity:  90 tablet   Refills:  3       finasteride 5 MG tablet   Commonly known as:  PROSCAR   This may have changed:  when to take this   Used for:  Benign prostatic hyperplasia with urinary retention        Dose:  5 mg   Take 1 tablet (5 mg) by mouth daily   Quantity:  90 tablet   Refills:  3                Primary Care Provider Office Phone # Fax #    Haley Baker [phone] [address] [facility] [address]        Equal Access to Services     [facility] CenterHUMPHREY : Hadii aad ku hadasho Soomaali, waaxda luqadaha, qaybta kaalmada adeegyada, waxniall duong haysaadia green . So [facility] [phone].    ATENCIÓN: Si habla español, tiene a montague disposición servicios gratuitos de asistencia lingüística. Llame al [phone].    We comply with applicable federal civil rights laws and [place] laws. We do not discriminate on the basis of race, color, national origin, age, disability, sex, sexual orientation, or gender identity.            Thank you!     Thank you for choosing [facility] UROLOGY AND [facility] FOR PROSTATE AND UROLOGIC CANCERS  for your care. Our goal is always to provide you with excellent care. Hearing back from our patients is one way we can continue to improve our services. Please take a few minutes to complete the written survey that you may receive in the mail after your visit with us. Thank you!             Your Updated Medication List - Protect others around you: Learn how to safely use, store and throw away your medicines at www.disposemymeds.org.          This list is accurate as of 3/14/18 12:49 PM.  Always use your most recent med list.                   Brand Name Dispense Instructions for use Diagnosis    amLODIPine 2.5 MG tablet    NORVASC    90 tablet    Take 1 tablet (2.5 mg) by mouth daily    Renal hypertension       aspirin 81 MG tablet     30 tablet    Take 1 tablet (81 mg) by mouth every morning - RESUME on  3/9/18 if urine remains clear    S/P TURP       calcium acetate 667 MG Caps capsule    PHOSLO    540 capsule    Take 2 capsules (1,334 mg) by mouth 3 times daily (with meals)    Chronic kidney disease, unspecified CKD stage       darbepoetin william 100 MCG/0.5ML injection    ARANESP (ALBUMIN FREE)    0.5 mL    Inject 0.5 mLs (100 mcg) Subcutaneous every 14 days As needed for hgb<10g/dL.  If Hgb increases >1 point in 2 weeks (if blood transfusion given, use hgb PRIOR to this), SYSTOLIC BP > 180 mmHg or hgb>=10g/dL, HOLD DOSE. Dose must be within 1 week of Hgb.  Per anemia protocol with Cecilia De La Torre,BRANDEN    Anemia of chronic renal failure, stage 5 (H), CKD (chronic kidney disease) stage 5, GFR less than 15 ml/min (H)       finasteride 5 MG tablet    PROSCAR    90 tablet    Take 1 tablet (5 mg) by mouth daily    Benign prostatic hyperplasia with urinary retention       furosemide 20 MG tablet    LASIX          order for DME     1 each    Equipment being ordered: olecranon bursa brace    Olecranon bursitis of left elbow

## 2018-03-15 ENCOUNTER — OFFICE VISIT (OUTPATIENT)
Dept: INTERNAL MEDICINE CLINIC | Age: 63
End: 2018-03-15
Payer: COMMERCIAL

## 2018-03-15 VITALS
HEIGHT: 63 IN | TEMPERATURE: 98 F | HEART RATE: 72 BPM | DIASTOLIC BLOOD PRESSURE: 76 MMHG | SYSTOLIC BLOOD PRESSURE: 128 MMHG | BODY MASS INDEX: 39.44 KG/M2 | WEIGHT: 222.6 LBS | RESPIRATION RATE: 16 BRPM

## 2018-03-15 DIAGNOSIS — J40 BRONCHITIS: Primary | ICD-10-CM

## 2018-03-15 LAB
INFLUENZA A ANTIBODY: NORMAL
INFLUENZA B ANTIBODY: NORMAL

## 2018-03-15 PROCEDURE — 94640 AIRWAY INHALATION TREATMENT: CPT | Performed by: INTERNAL MEDICINE

## 2018-03-15 PROCEDURE — 99213 OFFICE O/P EST LOW 20 MIN: CPT | Performed by: INTERNAL MEDICINE

## 2018-03-15 PROCEDURE — 96372 THER/PROPH/DIAG INJ SC/IM: CPT | Performed by: INTERNAL MEDICINE

## 2018-03-15 PROCEDURE — 87804 INFLUENZA ASSAY W/OPTIC: CPT | Performed by: INTERNAL MEDICINE

## 2018-03-15 PROCEDURE — 94642 AEROSOL INHALATION TREATMENT: CPT | Performed by: INTERNAL MEDICINE

## 2018-03-15 RX ORDER — CEFUROXIME AXETIL 500 MG/1
500 TABLET ORAL 2 TIMES DAILY
Qty: 20 TABLET | Refills: 0 | Status: SHIPPED | OUTPATIENT
Start: 2018-03-15 | End: 2018-03-25

## 2018-03-15 RX ORDER — METHYLPREDNISOLONE SODIUM SUCCINATE 125 MG/2ML
125 INJECTION, POWDER, LYOPHILIZED, FOR SOLUTION INTRAMUSCULAR; INTRAVENOUS ONCE
Status: COMPLETED | OUTPATIENT
Start: 2018-03-15 | End: 2018-03-15

## 2018-03-15 RX ORDER — ALBUTEROL SULFATE 90 UG/1
2 AEROSOL, METERED RESPIRATORY (INHALATION) EVERY 6 HOURS PRN
Qty: 1 INHALER | Refills: 0 | Status: SHIPPED | OUTPATIENT
Start: 2018-03-15 | End: 2018-06-04 | Stop reason: CLARIF

## 2018-03-15 RX ORDER — CEFTRIAXONE 1 G/1
1 INJECTION, POWDER, FOR SOLUTION INTRAMUSCULAR; INTRAVENOUS ONCE
Qty: 1 G | Refills: 0
Start: 2018-03-15 | End: 2018-03-15 | Stop reason: CLARIF

## 2018-03-15 RX ORDER — CEFTRIAXONE 1 G/1
1 INJECTION, POWDER, FOR SOLUTION INTRAMUSCULAR; INTRAVENOUS ONCE
Status: COMPLETED | OUTPATIENT
Start: 2018-03-15 | End: 2018-03-15

## 2018-03-15 RX ORDER — ALBUTEROL SULFATE 2.5 MG/3ML
2.5 SOLUTION RESPIRATORY (INHALATION) ONCE
Status: COMPLETED | OUTPATIENT
Start: 2018-03-15 | End: 2018-03-15

## 2018-03-15 RX ADMIN — ALBUTEROL SULFATE 2.5 MG: 2.5 SOLUTION RESPIRATORY (INHALATION) at 16:42

## 2018-03-15 RX ADMIN — CEFTRIAXONE 1 G: 1 INJECTION, POWDER, FOR SOLUTION INTRAMUSCULAR; INTRAVENOUS at 16:48

## 2018-03-15 RX ADMIN — METHYLPREDNISOLONE SODIUM SUCCINATE 125 MG: 125 INJECTION, POWDER, LYOPHILIZED, FOR SOLUTION INTRAMUSCULAR; INTRAVENOUS at 16:43

## 2018-03-15 NOTE — PROGRESS NOTES
Cervical cancer screen  01/21/2019    Breast cancer screen  10/16/2019    Lipid screen  12/30/2021    Colon cancer screen colonoscopy  08/18/2023
Sig: Inhale 2 puffs into the lungs every 6 hours as needed for Wheezing     Dispense:  1 Inhaler     Refill:  0          Completed Refills               Requested Prescriptions     Signed Prescriptions Disp Refills    cefUROXime (CEFTIN) 500 MG tablet 20 tablet 0     Sig: Take 1 tablet by mouth 2 times daily for 10 days    albuterol sulfate HFA (PROAIR HFA) 108 (90 Base) MCG/ACT inhaler 1 Inhaler 0     Sig: Inhale 2 puffs into the lungs every 6 hours as needed for Wheezing     4. Patient given educational materials - see patient instructions    5. Was a self-tracking handout given in paper form or via [employer]? NO    Orders Placed This Encounter   Procedures    XR CHEST STANDARD (2 VW)     Standing Status:   Future     Standing Expiration Date:   3/15/2019    ID AEROSOL INHALATION TREATMENT     No Follow-up on file. Patient voiced understanding and agreed to treatment plan. Electronically signed by Dixie Beckman MD on 3/15/2018 at 4:21 PM    This note is created with a voice recognition program and while intend to generate a document that accurately reflects the content of the visit, no guarantee can be provided that every mistake has been identified and corrected by editing.

## 2018-03-19 ENCOUNTER — TELEPHONE (OUTPATIENT)
Dept: INTERNAL MEDICINE CLINIC | Age: 63
End: 2018-03-19

## 2018-03-19 RX ORDER — ESCITALOPRAM OXALATE 10 MG/1
TABLET ORAL
Qty: 90 TABLET | Refills: 0 | Status: SHIPPED | OUTPATIENT
Start: 2018-03-19 | End: 2018-06-04 | Stop reason: SDUPTHER

## 2018-03-19 NOTE — TELEPHONE ENCOUNTER
Spoke to pt, ,she will stop proair and continue thyer ATBX that Dr Akosua Roberto ordered last week.   Told er or urgent care if worse

## 2018-03-19 NOTE — TELEPHONE ENCOUNTER
Patient was given proair last wk since taking the proair she has been coughing all day and night asking if that was a side effect and if its ok or should she get something different   Also since the constant cough she has not had much sleep in 2 days is there anything she can get for sleep

## 2018-03-28 ENCOUNTER — HOSPITAL ENCOUNTER (OUTPATIENT)
Age: 63
Setting detail: SPECIMEN
Discharge: HOME | End: 2018-03-28
Payer: COMMERCIAL

## 2018-03-28 LAB
MAGNESIUM: 1.7 MG/DL (ref 1.6–2.6)
VITAMIN B-12: 422 PG/ML (ref 232–1245)

## 2018-03-30 LAB — ZINC: 73 UG/DL (ref 60–120)

## 2018-04-23 ENCOUNTER — HOSPITAL ENCOUNTER (OUTPATIENT)
Age: 63
Setting detail: SPECIMEN
Discharge: HOME | End: 2018-04-23
Payer: COMMERCIAL

## 2018-04-25 ENCOUNTER — PATIENT MESSAGE (OUTPATIENT)
Dept: INTERNAL MEDICINE CLINIC | Age: 63
End: 2018-04-25

## 2018-04-25 LAB — SURGICAL PATHOLOGY REPORT: NORMAL

## 2018-04-26 ENCOUNTER — TELEPHONE (OUTPATIENT)
Dept: INTERNAL MEDICINE CLINIC | Age: 63
End: 2018-04-26

## 2018-04-26 DIAGNOSIS — R05.9 COUGH: Primary | ICD-10-CM

## 2018-04-26 DIAGNOSIS — J02.9 PHARYNGITIS, UNSPECIFIED ETIOLOGY: ICD-10-CM

## 2018-04-26 DIAGNOSIS — T17.308A CHOKING, INITIAL ENCOUNTER: ICD-10-CM

## 2018-05-10 ENCOUNTER — PATIENT MESSAGE (OUTPATIENT)
Dept: INTERNAL MEDICINE CLINIC | Age: 63
End: 2018-05-10

## 2018-05-11 RX ORDER — IBUPROFEN 800 MG/1
800 TABLET ORAL EVERY 8 HOURS PRN
Qty: 60 TABLET | Refills: 0 | Status: SHIPPED | OUTPATIENT
Start: 2018-05-11 | End: 2018-09-17 | Stop reason: SDUPTHER

## 2018-05-22 ENCOUNTER — TELEPHONE (OUTPATIENT)
Dept: INTERNAL MEDICINE CLINIC | Age: 63
End: 2018-05-22

## 2018-05-22 DIAGNOSIS — R39.9 URINARY SYMPTOM OR SIGN: Primary | ICD-10-CM

## 2018-05-23 ENCOUNTER — HOSPITAL ENCOUNTER (OUTPATIENT)
Age: 63
Setting detail: SPECIMEN
Discharge: HOME | End: 2018-05-23
Payer: COMMERCIAL

## 2018-05-23 LAB
-: NORMAL
ABSOLUTE EOS #: 0.16 K/UL (ref 0–0.44)
ABSOLUTE IMMATURE GRANULOCYTE: 0.04 K/UL (ref 0–0.3)
ABSOLUTE LYMPH #: 1.84 K/UL (ref 1.1–3.7)
ABSOLUTE MONO #: 0.5 K/UL (ref 0.1–1.2)
ALBUMIN SERPL-MCNC: 4.4 G/DL (ref 3.5–5.2)
ALBUMIN/GLOBULIN RATIO: 1.5 (ref 1–2.5)
ALP BLD-CCNC: 58 U/L (ref 35–104)
ALT SERPL-CCNC: 88 U/L (ref 5–33)
AMORPHOUS: NORMAL
ANION GAP SERPL CALCULATED.3IONS-SCNC: 15 MMOL/L (ref 9–17)
AST SERPL-CCNC: 58 U/L
BACTERIA: NORMAL
BASOPHILS # BLD: 0 % (ref 0–2)
BASOPHILS ABSOLUTE: <0.03 K/UL (ref 0–0.2)
BILIRUB SERPL-MCNC: 0.75 MG/DL (ref 0.3–1.2)
BILIRUBIN URINE: NEGATIVE
BUN BLDV-MCNC: 20 MG/DL (ref 8–23)
BUN/CREAT BLD: ABNORMAL (ref 9–20)
CALCIUM SERPL-MCNC: 9.4 MG/DL (ref 8.6–10.4)
CASTS UA: NORMAL /LPF (ref 0–8)
CHLORIDE BLD-SCNC: 102 MMOL/L (ref 98–107)
CHOLESTEROL, FASTING: 191 MG/DL
CHOLESTEROL/HDL RATIO: 4.8
CO2: 26 MMOL/L (ref 20–31)
COLOR: YELLOW
COMMENT UA: ABNORMAL
CREAT SERPL-MCNC: 0.84 MG/DL (ref 0.5–0.9)
CRYSTALS, UA: NORMAL /HPF
DIFFERENTIAL TYPE: ABNORMAL
EOSINOPHILS RELATIVE PERCENT: 2 % (ref 1–4)
EPITHELIAL CELLS UA: NORMAL /HPF (ref 0–5)
GFR AFRICAN AMERICAN: >60 ML/MIN
GFR NON-AFRICAN AMERICAN: >60 ML/MIN
GFR SERPL CREATININE-BSD FRML MDRD: ABNORMAL ML/MIN/{1.73_M2}
GFR SERPL CREATININE-BSD FRML MDRD: ABNORMAL ML/MIN/{1.73_M2}
GLUCOSE FASTING: 163 MG/DL (ref 70–99)
GLUCOSE URINE: NEGATIVE
HCT VFR BLD CALC: 44.3 % (ref 36.3–47.1)
HDLC SERPL-MCNC: 40 MG/DL
HEMOGLOBIN: 13.7 G/DL (ref 11.9–15.1)
IMMATURE GRANULOCYTES: 1 %
KETONES, URINE: NEGATIVE
LDL CHOLESTEROL: 110 MG/DL (ref 0–130)
LEUKOCYTE ESTERASE, URINE: NEGATIVE
LYMPHOCYTES # BLD: 27 % (ref 24–43)
MCH RBC QN AUTO: 27.6 PG (ref 25.2–33.5)
MCHC RBC AUTO-ENTMCNC: 30.9 G/DL (ref 28.4–34.8)
MCV RBC AUTO: 89.3 FL (ref 82.6–102.9)
MONOCYTES # BLD: 7 % (ref 3–12)
MUCUS: NORMAL
NITRITE, URINE: NEGATIVE
NRBC AUTOMATED: 0 PER 100 WBC
OTHER OBSERVATIONS UA: NORMAL
PDW BLD-RTO: 12.7 % (ref 11.8–14.4)
PH UA: 5.5 (ref 5–8)
PLATELET # BLD: 218 K/UL (ref 138–453)
PLATELET ESTIMATE: ABNORMAL
PMV BLD AUTO: 10.5 FL (ref 8.1–13.5)
POTASSIUM SERPL-SCNC: 5.1 MMOL/L (ref 3.7–5.3)
PROTEIN UA: ABNORMAL
RBC # BLD: 4.96 M/UL (ref 3.95–5.11)
RBC # BLD: ABNORMAL 10*6/UL
RBC UA: NORMAL /HPF (ref 0–4)
RENAL EPITHELIAL, UA: NORMAL /HPF
SEG NEUTROPHILS: 63 % (ref 36–65)
SEGMENTED NEUTROPHILS ABSOLUTE COUNT: 4.16 K/UL (ref 1.5–8.1)
SODIUM BLD-SCNC: 143 MMOL/L (ref 135–144)
SPECIFIC GRAVITY UA: 1.02 (ref 1–1.03)
TOTAL PROTEIN: 7.3 G/DL (ref 6.4–8.3)
TRICHOMONAS: NORMAL
TRIGLYCERIDE, FASTING: 203 MG/DL
TURBIDITY: CLEAR
URINE HGB: ABNORMAL
UROBILINOGEN, URINE: NORMAL
VLDLC SERPL CALC-MCNC: ABNORMAL MG/DL (ref 1–30)
WBC # BLD: 6.7 K/UL (ref 3.5–11.3)
WBC # BLD: ABNORMAL 10*3/UL
WBC UA: NORMAL /HPF (ref 0–5)
YEAST: NORMAL

## 2018-05-24 LAB
CULTURE: NORMAL
CULTURE: NORMAL
Lab: NORMAL
SPECIMEN DESCRIPTION: NORMAL
STATUS: NORMAL

## 2018-05-30 ENCOUNTER — PATIENT MESSAGE (OUTPATIENT)
Dept: INTERNAL MEDICINE CLINIC | Age: 63
End: 2018-05-30

## 2018-06-04 ENCOUNTER — OFFICE VISIT (OUTPATIENT)
Dept: INTERNAL MEDICINE CLINIC | Age: 63
End: 2018-06-04
Payer: COMMERCIAL

## 2018-06-04 VITALS
HEART RATE: 72 BPM | BODY MASS INDEX: 40.93 KG/M2 | DIASTOLIC BLOOD PRESSURE: 82 MMHG | RESPIRATION RATE: 16 BRPM | WEIGHT: 222.4 LBS | HEIGHT: 62 IN | SYSTOLIC BLOOD PRESSURE: 152 MMHG | TEMPERATURE: 98.4 F

## 2018-06-04 DIAGNOSIS — E11.9 TYPE 2 DIABETES MELLITUS WITHOUT COMPLICATION, WITHOUT LONG-TERM CURRENT USE OF INSULIN (HCC): Primary | ICD-10-CM

## 2018-06-04 LAB
GLUCOSE BLD-MCNC: 128 MG/DL
HBA1C MFR BLD: 7.5 %

## 2018-06-04 PROCEDURE — 99213 OFFICE O/P EST LOW 20 MIN: CPT | Performed by: INTERNAL MEDICINE

## 2018-06-04 PROCEDURE — 83036 HEMOGLOBIN GLYCOSYLATED A1C: CPT | Performed by: INTERNAL MEDICINE

## 2018-06-04 PROCEDURE — 82962 GLUCOSE BLOOD TEST: CPT | Performed by: INTERNAL MEDICINE

## 2018-06-04 RX ORDER — OMEPRAZOLE 40 MG/1
1 CAPSULE, DELAYED RELEASE ORAL 2 TIMES DAILY
COMMUNITY
Start: 2018-05-17 | End: 2020-06-16

## 2018-06-04 RX ORDER — RANITIDINE 150 MG/1
150 TABLET ORAL NIGHTLY
COMMUNITY
End: 2018-07-05 | Stop reason: ALTCHOICE

## 2018-06-04 RX ORDER — ESCITALOPRAM OXALATE 10 MG/1
TABLET ORAL
Qty: 90 TABLET | Refills: 1 | Status: SHIPPED | OUTPATIENT
Start: 2018-06-04 | End: 2018-07-05 | Stop reason: SDUPTHER

## 2018-06-05 ENCOUNTER — PATIENT MESSAGE (OUTPATIENT)
Dept: INTERNAL MEDICINE CLINIC | Age: 63
End: 2018-06-05

## 2018-06-05 RX ORDER — BLOOD-GLUCOSE METER
KIT MISCELLANEOUS
Qty: 1 KIT | Refills: 0 | Status: SHIPPED | OUTPATIENT
Start: 2018-06-05 | End: 2020-12-09

## 2018-06-05 RX ORDER — LANCETS 30 GAUGE
EACH MISCELLANEOUS
Qty: 100 EACH | Refills: 3 | Status: SHIPPED | OUTPATIENT
Start: 2018-06-05 | End: 2021-11-18 | Stop reason: ALTCHOICE

## 2018-06-29 NOTE — TELEPHONE ENCOUNTER
Diarrhea    Patient complains of diarrhea:  Onset when she started metformin  Diarrhea approximately patient states pretty much all day off and on and when she eats about 10 minutes after she is running to the restroom with diarrhea . Diarrhea is described as watery  Other symptoms include nausea   Can patient keep fluids down Yes  Does eating make it worse Yes   Patient is looking for any suggestions on what should she do she is on metformin 1000mg bid,her BS  this am before breakfast and has not been over 120  period. She stated she wonders if she is on too much. She does have appt 7-6-18 but today the nausea and diarrhea is so bad she is leaving work. Please advise the office staff pool,patient is requesting  a return call   Health Maintenance   Topic Date Due    Diabetic foot exam  07/23/1966    Diabetic retinal exam  07/23/1966    Diabetic microalbuminuria test  07/23/1974    DTaP/Tdap/Td vaccine (1 - Tdap) 08/08/2018 (Originally 7/23/1975)    Pneumococcal med risk (1 of 1 - PPSV23) 08/18/2018 (Originally 7/23/1975)    HIV screen  09/05/2018 (Originally 7/23/1971)    Hepatitis C screen  12/12/2018 (Originally 1955)    Flu vaccine (Season Ended) 03/20/2019 (Originally 9/1/2018)    Shingles Vaccine (1 of 2 - 2 Dose Series) 03/21/2019 (Originally 7/23/2006)    Cervical cancer screen  01/21/2019    Lipid screen  05/23/2019    Potassium monitoring  05/23/2019    Creatinine monitoring  05/23/2019    A1C test (Diabetic or Prediabetic)  06/04/2019    Breast cancer screen  10/16/2019    Colon cancer screen colonoscopy  08/18/2023             (applicable per patient's age: Cancer Screenings, Depression Screening, Fall Risk Screening, Immunizations)    Hemoglobin A1C (%)   Date Value   06/04/2018 7.5   09/03/2014 5.4     LDL Cholesterol (mg/dL)   Date Value   05/23/2018 110     AST (U/L)   Date Value   05/23/2018 58 (H)     ALT (U/L)   Date Value   05/23/2018 88 (H)     BUN (mg/dL)   Date Value

## 2018-07-05 ENCOUNTER — OFFICE VISIT (OUTPATIENT)
Dept: INTERNAL MEDICINE CLINIC | Age: 63
End: 2018-07-05
Payer: COMMERCIAL

## 2018-07-05 VITALS
HEART RATE: 74 BPM | WEIGHT: 213 LBS | BODY MASS INDEX: 39.2 KG/M2 | TEMPERATURE: 98.4 F | SYSTOLIC BLOOD PRESSURE: 124 MMHG | HEIGHT: 62 IN | RESPIRATION RATE: 16 BRPM | DIASTOLIC BLOOD PRESSURE: 72 MMHG

## 2018-07-05 DIAGNOSIS — E11.9 TYPE 2 DIABETES MELLITUS WITHOUT COMPLICATION, WITHOUT LONG-TERM CURRENT USE OF INSULIN (HCC): Primary | ICD-10-CM

## 2018-07-05 DIAGNOSIS — E78.00 HYPERCHOLESTEROLEMIA: ICD-10-CM

## 2018-07-05 DIAGNOSIS — I10 ESSENTIAL HYPERTENSION: ICD-10-CM

## 2018-07-05 PROCEDURE — 99214 OFFICE O/P EST MOD 30 MIN: CPT | Performed by: INTERNAL MEDICINE

## 2018-07-05 RX ORDER — ATENOLOL 50 MG/1
TABLET ORAL
Qty: 180 TABLET | Refills: 1 | Status: SHIPPED | OUTPATIENT
Start: 2018-07-05 | End: 2019-02-26 | Stop reason: SDUPTHER

## 2018-07-05 RX ORDER — ATENOLOL 50 MG/1
TABLET ORAL
Qty: 180 TABLET | Refills: 1 | Status: SHIPPED | OUTPATIENT
Start: 2018-07-05 | End: 2018-07-05 | Stop reason: SDUPTHER

## 2018-07-05 RX ORDER — ESCITALOPRAM OXALATE 10 MG/1
TABLET ORAL
Qty: 90 TABLET | Refills: 1 | Status: SHIPPED | OUTPATIENT
Start: 2018-07-05 | End: 2019-07-01 | Stop reason: SDUPTHER

## 2018-07-05 NOTE — PROGRESS NOTES
Dose Series) 03/21/2019 (Originally 7/23/2006)    Cervical cancer screen  01/21/2019    Lipid screen  05/23/2019    Potassium monitoring  05/23/2019    Creatinine monitoring  05/23/2019    A1C test (Diabetic or Prediabetic)  06/04/2019    Breast cancer screen  10/16/2019    Colon cancer screen colonoscopy  08/18/2023

## 2018-07-05 NOTE — PROGRESS NOTES
 Hematuria due to chronic cystitis     HTN (hypertension)     IBS (irritable bowel syndrome)     Obesity     Tachycardia     Thyroid disease     nodules, hurshimoto disease    Unspecified sleep apnea     does not use machine       Past Surgical History:   Procedure Laterality Date    ABDOMEN SURGERY      abdominoplasty 2002    BREAST REDUCTION SURGERY      CHOLECYSTECTOMY      COLONOSCOPY      CYST REMOVAL Bilateral     feet    FOOT TENDON SURGERY Left 12/2/15    HYSTERECTOMY      with BSO    TONSILLECTOMY AND ADENOIDECTOMY         Family History   Problem Relation Age of Onset    Heart Disease Mother     Parkinsonism Mother     Hypertension Mother    [facility] Cancer Sister         cervical/cervical 80s    Hypertension Sister     High Blood Pressure Other     Hypertension Brother     Elevated Lipids Brother     Other Brother         stents     ROS   Constitutional:  Negative for fatigue, loss of appetite and unexpected weight change   HEENT            : Negative for neck stiffness and pain, no congestion or sinus pressure   Eyes                : No visual disturbance or pain   Cardiovascular: No chest pain or palpitations or leg swelling   Respiratory      : Negative for cough, shortness of breath or wheezing   Gastrointestinal: Negative for abdominal pain, constipation or diarrhea and bloating No nausea or vomiting   Genitourinary:     No urgency or frequency, no burning or hematuria   Musculoskeletal: No arthralgias, back pain or myalgias   Skin                  : Negative for rash or erythema   Neurological    : Negative for dizziness, weakness, tremors ,light headedness or syncope   Psychiatric       : Negative for dysphoric mood, sleep disturbances, nervous or anxious, or decreased concentration   All other review of systems was negative    Objective  Physical Examination:    Nursing note reviewed    /72 (Site: Left Arm, Position: Sitting, Cuff Size: Large Adult)   Pulse Procedures    Hemoglobin A1C     Standing Status:   Future     Standing Expiration Date:   7/5/2019    Comprehensive Metabolic Panel     Standing Status:   Future     Standing Expiration Date:   7/5/2019    Microalbumin / Creatinine Urine Ratio     Standing Status:   Future     Standing Expiration Date:   7/5/2019    Lipid Panel     Standing Status:   Future     Standing Expiration Date:   7/5/2019     Order Specific Question:   Is Patient Fasting?/# of Hours     Answer:   12    CBC     Standing Status:   Future     Standing Expiration Date:   7/5/2019     Return in about 2 months (around 9/5/2018). Patient voiced understanding and agreed to treatment plan. Electronically signed by Jimmy Grove MD on 7/5/2018 at 10:15 AM    This note is created with a voice recognition program and while intend to generate a document that accurately reflects the content of the visit, no guarantee can be provided that every mistake has been identified and corrected by editing.

## 2018-07-23 ENCOUNTER — PATIENT MESSAGE (OUTPATIENT)
Dept: INTERNAL MEDICINE CLINIC | Age: 63
End: 2018-07-23

## 2018-07-25 ENCOUNTER — HOSPITAL ENCOUNTER (OUTPATIENT)
Age: 63
Setting detail: SPECIMEN
Discharge: HOME | End: 2018-07-25
Payer: COMMERCIAL

## 2018-07-25 DIAGNOSIS — I10 ESSENTIAL HYPERTENSION: ICD-10-CM

## 2018-07-25 DIAGNOSIS — E78.00 HYPERCHOLESTEROLEMIA: ICD-10-CM

## 2018-07-25 DIAGNOSIS — E11.9 TYPE 2 DIABETES MELLITUS WITHOUT COMPLICATION, WITHOUT LONG-TERM CURRENT USE OF INSULIN (HCC): ICD-10-CM

## 2018-07-25 LAB
ALBUMIN SERPL-MCNC: 4.5 G/DL (ref 3.5–5.2)
ALBUMIN/GLOBULIN RATIO: 1.5 (ref 1–2.5)
ALP BLD-CCNC: 55 U/L (ref 35–104)
ALT SERPL-CCNC: 58 U/L (ref 5–33)
ANION GAP SERPL CALCULATED.3IONS-SCNC: 15 MMOL/L (ref 9–17)
AST SERPL-CCNC: 44 U/L
BILIRUB SERPL-MCNC: 0.62 MG/DL (ref 0.3–1.2)
BUN BLDV-MCNC: 18 MG/DL (ref 8–23)
BUN/CREAT BLD: ABNORMAL (ref 9–20)
CALCIUM SERPL-MCNC: 9.6 MG/DL (ref 8.6–10.4)
CHLORIDE BLD-SCNC: 103 MMOL/L (ref 98–107)
CHOLESTEROL/HDL RATIO: 4
CHOLESTEROL: 181 MG/DL
CO2: 23 MMOL/L (ref 20–31)
CREAT SERPL-MCNC: 0.92 MG/DL (ref 0.5–0.9)
CREATININE URINE: 161.2 MG/DL (ref 28–217)
ESTIMATED AVERAGE GLUCOSE: 120 MG/DL
GFR AFRICAN AMERICAN: >60 ML/MIN
GFR NON-AFRICAN AMERICAN: >60 ML/MIN
GFR SERPL CREATININE-BSD FRML MDRD: ABNORMAL ML/MIN/{1.73_M2}
GFR SERPL CREATININE-BSD FRML MDRD: ABNORMAL ML/MIN/{1.73_M2}
GLUCOSE BLD-MCNC: 78 MG/DL (ref 70–99)
HBA1C MFR BLD: 5.8 % (ref 4–6)
HCT VFR BLD CALC: 43.6 % (ref 36.3–47.1)
HDLC SERPL-MCNC: 45 MG/DL
HEMOGLOBIN: 13.3 G/DL (ref 11.9–15.1)
LDL CHOLESTEROL: 103 MG/DL (ref 0–130)
MCH RBC QN AUTO: 27.3 PG (ref 25.2–33.5)
MCHC RBC AUTO-ENTMCNC: 30.5 G/DL (ref 28.4–34.8)
MCV RBC AUTO: 89.3 FL (ref 82.6–102.9)
MICROALBUMIN/CREAT 24H UR: 1037 MG/L
MICROALBUMIN/CREAT UR-RTO: 643 MCG/MG CREAT
NRBC AUTOMATED: 0 PER 100 WBC
PDW BLD-RTO: 13.2 % (ref 11.8–14.4)
PLATELET # BLD: 251 K/UL (ref 138–453)
PMV BLD AUTO: 10.6 FL (ref 8.1–13.5)
POTASSIUM SERPL-SCNC: 4.2 MMOL/L (ref 3.7–5.3)
RBC # BLD: 4.88 M/UL (ref 3.95–5.11)
SODIUM BLD-SCNC: 141 MMOL/L (ref 135–144)
TOTAL PROTEIN: 7.6 G/DL (ref 6.4–8.3)
TRIGL SERPL-MCNC: 163 MG/DL
VLDLC SERPL CALC-MCNC: ABNORMAL MG/DL (ref 1–30)
WBC # BLD: 7.3 K/UL (ref 3.5–11.3)

## 2018-07-27 ENCOUNTER — TELEPHONE (OUTPATIENT)
Dept: INTERNAL MEDICINE CLINIC | Age: 63
End: 2018-07-27

## 2018-07-27 ENCOUNTER — PATIENT MESSAGE (OUTPATIENT)
Dept: INTERNAL MEDICINE CLINIC | Age: 63
End: 2018-07-27

## 2018-07-27 DIAGNOSIS — R74.8 ABNORMAL LIVER ENZYMES: Primary | ICD-10-CM

## 2018-07-27 RX ORDER — LOSARTAN POTASSIUM 25 MG/1
25 TABLET ORAL DAILY
Qty: 30 TABLET | Refills: 3 | Status: SHIPPED | OUTPATIENT
Start: 2018-07-27 | End: 2018-11-28 | Stop reason: SDUPTHER

## 2018-07-27 NOTE — TELEPHONE ENCOUNTER
Looks like alt and ast has been ordered   Script sent to [facility] for patient tcb if needed

## 2018-07-30 NOTE — TELEPHONE ENCOUNTER
From: Magui Amend  To: Clara Gudino MD  Sent: 7/27/2018 4:29 PM EDT  Subject: Non-Urgent Medical Question    Doctor K,   Should I be concerned about my Liver?

## 2018-08-07 ENCOUNTER — OFFICE VISIT (OUTPATIENT)
Dept: OBGYN CLINIC | Age: 63
End: 2018-08-07
Payer: COMMERCIAL

## 2018-08-07 VITALS
HEIGHT: 62 IN | SYSTOLIC BLOOD PRESSURE: 139 MMHG | HEART RATE: 96 BPM | BODY MASS INDEX: 38.09 KG/M2 | WEIGHT: 207 LBS | DIASTOLIC BLOOD PRESSURE: 80 MMHG

## 2018-08-07 DIAGNOSIS — N95.2 VAGINAL ATROPHY: ICD-10-CM

## 2018-08-07 DIAGNOSIS — Z01.419 WELL FEMALE EXAM WITH ROUTINE GYNECOLOGICAL EXAM: Primary | ICD-10-CM

## 2018-08-07 DIAGNOSIS — Z12.39 SCREENING FOR BREAST CANCER: ICD-10-CM

## 2018-08-07 PROBLEM — Z90.710 HISTORY OF HYSTERECTOMY FOR BENIGN DISEASE: Status: ACTIVE | Noted: 2018-08-07

## 2018-08-07 PROCEDURE — 99396 PREV VISIT EST AGE 40-64: CPT | Performed by: OBSTETRICS & GYNECOLOGY

## 2018-08-07 RX ORDER — ESTRADIOL 10 UG/1
INSERT VAGINAL
Qty: 32 TABLET | Refills: 3 | Status: SHIPPED | OUTPATIENT
Start: 2018-08-07 | End: 2018-10-16 | Stop reason: ALTCHOICE

## 2018-08-07 NOTE — LETTER
[facility]  [address]  [address]  Phone: [phone]  Fax: [phone]    Ryan Blackburn MD        August 7, 2018    [address] [place] [address] [address] [address]      Dear Lauren Childs:    Our records indicate that you are due for an annual exam on or after 8/7/19. The [place]. Preventive Services Task Force strongly recommends cervical cancer screening for all sexually-active women who haven't had their cervix removed. Please make an appointment for a Pap smear at your earliest convenience. If you are having your Pap smears done elsewhere, please let us know.      Sincerely,        Ryan Blackburn MD

## 2018-08-07 NOTE — PROGRESS NOTES
 Estradiol (VAGIFEM) 10 MCG TABS vaginal tablet One tablet per vagina nightly for two weeks then twice weekly 32 tablet 3    losartan (COZAAR) 25 MG tablet Take 1 tablet by mouth daily 30 tablet 3    SITagliptin (JANUVIA) 100 MG tablet Take 0.5 tablets by mouth daily 14 tablet 0    atenolol (TENORMIN) 50 MG tablet TAKE ONE TABLET BY MOUTH TWICE DAILY 180 tablet 1    escitalopram (LEXAPRO) 10 MG tablet TAKE 1 TABLET EVERY DAY 90 tablet 1    glucose blood VI test strips (ASCENSIA AUTODISC VI;ONE TOUCH ULTRA TEST VI) strip Test BID DX E11.9 Please dispense strips covered by patients insurance 100 each 3    Lancets MISC Use BID DX E11.9 dispense lancets covered by patients insurance 100 each 3    glucose monitoring kit (FREESTYLE) monitoring kit Test BID DX E11.9 dispense monitor covered by insurance 1 kit 0    omeprazole (PRILOSEC) 40 MG delayed release capsule Take 1 capsule by mouth 2 times daily      ibuprofen (ADVIL;MOTRIN) 800 MG tablet Take 1 tablet by mouth every 8 hours as needed for Pain 60 tablet 0     No current facility-administered medications for this visit. ALLERGIES:  Bactrim [sulfamethoxazole-trimethoprim]; Macrobid [nitrofurantoin]; Sulfa antibiotics; and Morphine  Immunization status: stated as current, but no records available. Gynecologic History:     No LMP recorded. Patient has had a hysterectomy. Sexually Active: Yes    STD History: No     Permanent Sterilization: Yes hyst   Reversible Birth Control: No        Hormone Replacement Exposure: Previously on Estratest      Family History of Breast, Ovarian , Colon or Uterine Cancer: Yes cervical   If YES see scanned worksheet.     Preventative Health Testing:  Date of Last Pap Smear: na  Abnormal Pap Smear History:   Colposcopy History:   Date of Last Mammogram: due  Date of Last Colonoscopy: due 2023  Date of Last Bone Density:      ________________________________________________________________________  REVIEW OF SYSTEMS: Lymphatic:  No Lymph Nodes were Palpable in the neck , axilla or groin. Inguinal lymph nodes wnl     Neck and EENT:  The neck was supple. There were no masses     Respiratory: The lungs were auscultated and found to be clear. There were no rales, rhonchi or wheezes. There was a good respiratory effort. Cardiovascular: The heart was in a regular rate and rhythm. . No S3 or S4. There was no murmur appreciated. Location, grade, and radiation are not applicable. Extremities: The patients extremities were without calf tenderness, edema, or varicosities. There was full range of motion in all four extremities. .    Abdomen: The abdomen was soft and non-tender. There were good bowel sounds in all quadrants and there was no guarding, rebound or rigidity. On evaluation there was no evidence of hepatosplenomegaly and there was no costal vertebral carol tenderness bilaterally. No hernias were appreciated. Abdominal Scars: healed    Psych: The patient had a normal Orientation to: Time, Place, Person, and Situation  There is no Mood / Affect changes    Breast:  (Chest)  normal appearance, no masses or tenderness, surgical scars healed  Self breast exams were reviewed in detail. Literature was given. Pelvic Exam:  External genitalia: normal general appearance  Urinary system: urethral meatus normal  Vaginal: atrophic mucosa  Cervix: absent  Adnexa: non palpable  Uterus: absent  Rectal: rectal normal, no masses    Rectal Exam:  normal      Musculosk:  Normal Gait and station was noted. Digits were evaluated without abnormal findings. Range of motion, stability and strength were evaluated and found to be appropriate for the patients age. ASSESSMENT:      59 y.o. Annual  1. Well female exam with routine gynecological exam    2. Screening for breast cancer    3.  Vaginal atrophy         Chief Complaint   Patient presents with    Annual Exam          Past Medical History:   Diagnosis Date   

## 2018-08-10 ENCOUNTER — PATIENT MESSAGE (OUTPATIENT)
Dept: INTERNAL MEDICINE CLINIC | Age: 63
End: 2018-08-10

## 2018-08-10 ENCOUNTER — TELEPHONE (OUTPATIENT)
Dept: INTERNAL MEDICINE CLINIC | Age: 63
End: 2018-08-10

## 2018-08-10 DIAGNOSIS — N39.0 URINARY TRACT INFECTION WITHOUT HEMATURIA, SITE UNSPECIFIED: Primary | ICD-10-CM

## 2018-08-10 RX ORDER — CIPROFLOXACIN 500 MG/1
500 TABLET, FILM COATED ORAL 2 TIMES DAILY
Qty: 14 TABLET | Refills: 0 | Status: SHIPPED | OUTPATIENT
Start: 2018-08-10 | End: 2018-08-17

## 2018-08-10 NOTE — TELEPHONE ENCOUNTER
From: Elaine Ortega  To: Russell Glasgow MD  Sent: 8/10/2018 11:08 AM EDT  Subject: Non-Urgent Medical Question    Doctor K:  I left a message with your office earlier today however I wanted to be certain you got it. I am in need of a prescription to be sent to [facility] for the jenovia. (spelled wrong)  Also, I need an antibiotic, I have another UTI and can not go through the weekend in this pain. Thank you so much!

## 2018-08-21 ENCOUNTER — OFFICE VISIT (OUTPATIENT)
Dept: INTERNAL MEDICINE CLINIC | Age: 63
End: 2018-08-21
Payer: COMMERCIAL

## 2018-08-21 VITALS
WEIGHT: 203.2 LBS | TEMPERATURE: 98.2 F | DIASTOLIC BLOOD PRESSURE: 72 MMHG | HEART RATE: 88 BPM | RESPIRATION RATE: 16 BRPM | BODY MASS INDEX: 37.39 KG/M2 | HEIGHT: 62 IN | SYSTOLIC BLOOD PRESSURE: 128 MMHG

## 2018-08-21 DIAGNOSIS — M70.71 BURSITIS OF OTHER BURSA OF RIGHT HIP: Primary | ICD-10-CM

## 2018-08-21 DIAGNOSIS — E11.9 TYPE 2 DIABETES MELLITUS WITHOUT COMPLICATION, WITHOUT LONG-TERM CURRENT USE OF INSULIN (HCC): ICD-10-CM

## 2018-08-21 PROCEDURE — 99213 OFFICE O/P EST LOW 20 MIN: CPT | Performed by: INTERNAL MEDICINE

## 2018-08-21 RX ORDER — PREDNISONE 10 MG/1
10 TABLET ORAL
Qty: 15 TABLET | Refills: 0 | Status: SHIPPED | OUTPATIENT
Start: 2018-08-21 | End: 2018-08-26

## 2018-08-21 ASSESSMENT — PATIENT HEALTH QUESTIONNAIRE - PHQ9
1. LITTLE INTEREST OR PLEASURE IN DOING THINGS: 0
SUM OF ALL RESPONSES TO PHQ9 QUESTIONS 1 & 2: 0
SUM OF ALL RESPONSES TO PHQ QUESTIONS 1-9: 0
2. FEELING DOWN, DEPRESSED OR HOPELESS: 0
SUM OF ALL RESPONSES TO PHQ QUESTIONS 1-9: 0

## 2018-08-21 NOTE — PROGRESS NOTES
Nieves Ryan is a 59 y.o. female who presents for   Chief Complaint   Patient presents with    Back Pain     twisted lower back 8/11/18 and now pain across low back and radiating down right hip/thigh area    Diabetes     BS this AM 91, last night 96.nothing over 120    and follow up of chronic medical problems.   Patient Active Problem List   Diagnosis    Tachycardia    HTN (hypertension)    Fibromyalgia    Chronic UTI    IBS (irritable bowel syndrome)    Obesity    Hypercholesterolemia    Chondromalacia patellae of right knee    Patellar contusion    Prediabetes    History of hysterectomy for benign disease    Vaginal atrophy     HPI  Here for evaluation of right-sided back pain and hip pain started 2 weeks back when she was mopping the floors and slightly better than the last week but still has pain    Current Outpatient Prescriptions   Medication Sig Dispense Refill    SITagliptin (JANUVIA) 100 MG tablet Take 0.5 tablets by mouth daily 15 tablet 5    predniSONE (DELTASONE) 10 MG tablet Take 1 tablet by mouth 3 times daily (with meals) for 5 days 15 tablet 0    Estradiol (VAGIFEM) 10 MCG TABS vaginal tablet One tablet per vagina nightly for two weeks then twice weekly 32 tablet 3    losartan (COZAAR) 25 MG tablet Take 1 tablet by mouth daily 30 tablet 3    atenolol (TENORMIN) 50 MG tablet TAKE ONE TABLET BY MOUTH TWICE DAILY 180 tablet 1    escitalopram (LEXAPRO) 10 MG tablet TAKE 1 TABLET EVERY DAY 90 tablet 1    glucose blood VI test strips (ASCENSIA AUTODISC VI;ONE TOUCH ULTRA TEST VI) strip Test BID DX E11.9 Please dispense strips covered by patients insurance 100 each 3    Lancets MISC Use BID DX E11.9 dispense lancets covered by patients insurance 100 each 3    glucose monitoring kit (FREESTYLE) monitoring kit Test BID DX E11.9 dispense monitor covered by insurance 1 kit 0    omeprazole (PRILOSEC) 40 MG delayed release capsule Take 1 capsule by mouth 2 times daily     

## 2018-08-21 NOTE — PROGRESS NOTES
Visit Information    Have you changed or started any medications since your last visit including any over-the-counter medicines, vitamins, or herbal medicines? no   Are you having any side effects from any of your medications? -  no  Have you stopped taking any of your medications? Is so, why? -  no    Have you seen any other physician or provider since your last visit? No  Have you had any other diagnostic tests since your last visit? No  Have you been seen in the emergency room and/or had an admission to a hospital since we last saw you? No  Have you had your routine dental cleaning in the past 6 months? yes -     Have you activated your [employer] account? If not, what are your barriers?  Yes     Patient Care Team:  Rocio Chavez MD as PCP - General    Medical History Review  Past Medical, Family, and Social History reviewed and does contribute to the patient presenting condition    Health Maintenance   Topic Date Due    Diabetic foot exam  07/23/1966    Diabetic retinal exam  07/23/1966    DTaP/Tdap/Td vaccine (1 - Tdap) 07/23/1975    Pneumococcal med risk (1 of 1 - PPSV23) 07/23/1975    HIV screen  09/05/2018 (Originally 7/23/1971)    Hepatitis C screen  12/12/2018 (Originally 1955)    Flu vaccine (1) 03/20/2019 (Originally 9/1/2018)    Shingles Vaccine (1 of 2 - 2 Dose Series) 03/21/2019 (Originally 7/23/2006)    A1C test (Diabetic or Prediabetic)  07/25/2019    Diabetic microalbuminuria test  07/25/2019    Lipid screen  07/25/2019    Potassium monitoring  07/25/2019    Creatinine monitoring  07/25/2019    Breast cancer screen  10/16/2019    Colon cancer screen colonoscopy  08/18/2023

## 2018-09-17 ENCOUNTER — PATIENT MESSAGE (OUTPATIENT)
Dept: INTERNAL MEDICINE CLINIC | Age: 63
End: 2018-09-17

## 2018-09-17 RX ORDER — IBUPROFEN 800 MG/1
800 TABLET ORAL EVERY 8 HOURS PRN
Qty: 60 TABLET | Refills: 0 | Status: SHIPPED | OUTPATIENT
Start: 2018-09-17 | End: 2019-01-31 | Stop reason: SDUPTHER

## 2018-09-22 ENCOUNTER — HOSPITAL ENCOUNTER (OUTPATIENT)
Dept: MAMMOGRAPHY | Age: 63
Discharge: HOME | End: 2018-09-24
Payer: COMMERCIAL

## 2018-09-22 DIAGNOSIS — Z12.39 SCREENING FOR BREAST CANCER: ICD-10-CM

## 2018-09-22 DIAGNOSIS — Z01.419 WELL FEMALE EXAM WITH ROUTINE GYNECOLOGICAL EXAM: ICD-10-CM

## 2018-09-22 PROCEDURE — 77063 BREAST TOMOSYNTHESIS BI: CPT

## 2018-10-15 ENCOUNTER — TELEPHONE (OUTPATIENT)
Dept: OBGYN CLINIC | Age: 63
End: 2018-10-15

## 2018-11-29 RX ORDER — LOSARTAN POTASSIUM 25 MG/1
TABLET ORAL
Qty: 30 TABLET | Refills: 2 | Status: SHIPPED | OUTPATIENT
Start: 2018-11-29 | End: 2019-03-08 | Stop reason: SDUPTHER

## 2018-12-11 ENCOUNTER — PATIENT MESSAGE (OUTPATIENT)
Dept: INTERNAL MEDICINE CLINIC | Age: 63
End: 2018-12-11

## 2018-12-17 RX ORDER — CYCLOBENZAPRINE HCL 10 MG
TABLET ORAL
Qty: 90 TABLET | Refills: 0 | Status: SHIPPED | OUTPATIENT
Start: 2018-12-17 | End: 2021-03-23

## 2018-12-19 ENCOUNTER — OFFICE VISIT (OUTPATIENT)
Dept: INTERNAL MEDICINE CLINIC | Age: 63
End: 2018-12-19
Payer: COMMERCIAL

## 2018-12-19 VITALS
WEIGHT: 198.2 LBS | HEART RATE: 86 BPM | SYSTOLIC BLOOD PRESSURE: 120 MMHG | BODY MASS INDEX: 36.47 KG/M2 | DIASTOLIC BLOOD PRESSURE: 66 MMHG | HEIGHT: 62 IN | RESPIRATION RATE: 20 BRPM

## 2018-12-19 DIAGNOSIS — E11.9 TYPE 2 DIABETES MELLITUS WITHOUT COMPLICATION, WITHOUT LONG-TERM CURRENT USE OF INSULIN (HCC): Primary | ICD-10-CM

## 2018-12-19 LAB — HBA1C MFR BLD: 5.1 %

## 2018-12-19 PROCEDURE — 83036 HEMOGLOBIN GLYCOSYLATED A1C: CPT | Performed by: INTERNAL MEDICINE

## 2018-12-19 PROCEDURE — 99213 OFFICE O/P EST LOW 20 MIN: CPT | Performed by: INTERNAL MEDICINE

## 2018-12-19 NOTE — PROGRESS NOTES
for dizziness, weakness, tremors ,light headedness or syncope   Psychiatric       : Negative for dysphoric mood, sleep disturbances, nervous or anxious, or decreased concentration   All other review of systems was negative    Objective  Physical Examination:    Nursing note reviewed    /66 (Site: Right Upper Arm, Position: Sitting)   Pulse 86   Resp 20   Ht 5' 2.01\" (1.575 m)   Wt 198 lb 3.2 oz (89.9 kg)   BMI 36.24 kg/m²   BP Readings from Last 3 Encounters:   12/19/18 120/66   08/21/18 128/72   08/07/18 139/80         Constitutional:  Jennifer Maker is oriented to place, person and time ,appears well-developed and well-nourished  HEENT:  Atraumatic and normocephalic, external ears normal bilaterally, nose normal no oropharyngeal exudate and is clear and moist  Eyes:  EOCM normal; conjunctivae normal; PERRLA bilaterally  Neck:  Normal range of motion, neck supple, no JVD and no thyromegaly  Cardiovascular:  RRR, normal heart sounds and intact distal pulses  Pulmonary:  effort normal and breath sounds normal bilaterally,no wheezes or rales, no respiratory distress  Abdominal:  Soft, non-tender; normal bowel sounds, no masses  Musculoskeletal:  Normal range of motion and no edema or tenderness bilaterally  No lymphadenopathy  Neurological:  alert, oriented, and normal reflexes bilaterally  Skin: warm and dry  Psychiatric:  normal mood and effect; behavior normal.    Labs:   Lab Results   Component Value Date    LABA1C 5.8 07/25/2018     Lab Results   Component Value Date    CHOL 181 07/25/2018     Lab Results   Component Value Date    HDL 45 07/25/2018     No results found for: [address]  Lab Results   Component Value Date    TRIG 163 (H) 07/25/2018     No components found for: CHOLHDL  Lab Results   Component Value Date    WBC 7.3 07/25/2018    HGB 13.3 07/25/2018    HCT 43.6 07/25/2018    MCV 89.3 07/25/2018     07/25/2018     No results found for: INR, PROTIME  Lab Results   Component Value Date

## 2018-12-31 ENCOUNTER — PATIENT MESSAGE (OUTPATIENT)
Dept: INTERNAL MEDICINE CLINIC | Age: 63
End: 2018-12-31

## 2018-12-31 DIAGNOSIS — E11.9 TYPE 2 DIABETES MELLITUS WITHOUT COMPLICATION, WITHOUT LONG-TERM CURRENT USE OF INSULIN (HCC): ICD-10-CM

## 2019-01-09 ENCOUNTER — PATIENT MESSAGE (OUTPATIENT)
Dept: INTERNAL MEDICINE CLINIC | Age: 64
End: 2019-01-09

## 2019-01-09 RX ORDER — MECLIZINE HYDROCHLORIDE 25 MG/1
25 TABLET ORAL 3 TIMES DAILY PRN
Qty: 30 TABLET | Refills: 0 | Status: SHIPPED | OUTPATIENT
Start: 2019-01-09 | End: 2019-01-19

## 2019-01-11 ENCOUNTER — HOSPITAL ENCOUNTER (OUTPATIENT)
Age: 64
Setting detail: SPECIMEN
Discharge: HOME | End: 2019-01-11
Payer: COMMERCIAL

## 2019-01-11 DIAGNOSIS — R74.8 ABNORMAL LIVER ENZYMES: ICD-10-CM

## 2019-01-11 LAB
ALT SERPL-CCNC: 22 U/L (ref 5–33)
AST SERPL-CCNC: 17 U/L

## 2019-01-14 ENCOUNTER — TELEPHONE (OUTPATIENT)
Dept: INTERNAL MEDICINE CLINIC | Age: 64
End: 2019-01-14

## 2019-01-25 ENCOUNTER — TELEPHONE (OUTPATIENT)
Dept: INTERNAL MEDICINE CLINIC | Age: 64
End: 2019-01-25

## 2019-01-25 ENCOUNTER — HOSPITAL ENCOUNTER (OUTPATIENT)
Age: 64
Discharge: HOME | End: 2019-01-27
Payer: COMMERCIAL

## 2019-01-25 ENCOUNTER — HOSPITAL ENCOUNTER (OUTPATIENT)
Dept: GENERAL RADIOLOGY | Age: 64
Discharge: HOME | End: 2019-01-27
Payer: COMMERCIAL

## 2019-01-25 DIAGNOSIS — M25.522 LEFT ELBOW PAIN: ICD-10-CM
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
## 2019-01-25 PROCEDURE — 73070 X-RAY EXAM OF ELBOW: CPT

## 2019-01-31 ENCOUNTER — PATIENT MESSAGE (OUTPATIENT)
Dept: INTERNAL MEDICINE CLINIC | Age: 64
End: 2019-01-31

## 2019-01-31 RX ORDER — IBUPROFEN 800 MG/1
TABLET ORAL
Qty: 60 TABLET | Refills: 0 | Status: SHIPPED | OUTPATIENT
Start: 2019-01-31 | End: 2019-11-03 | Stop reason: SDUPTHER

## 2019-02-05 ENCOUNTER — PATIENT MESSAGE (OUTPATIENT)
Dept: INTERNAL MEDICINE CLINIC | Age: 64
End: 2019-02-05

## 2019-02-13 DIAGNOSIS — E11.9 TYPE 2 DIABETES MELLITUS WITHOUT COMPLICATION, WITHOUT LONG-TERM CURRENT USE OF INSULIN (HCC): ICD-10-CM

## 2019-02-26 RX ORDER — ATENOLOL 50 MG/1
TABLET ORAL
Qty: 180 TABLET | Refills: 1 | Status: SHIPPED | OUTPATIENT
Start: 2019-02-26 | End: 2019-11-03 | Stop reason: SDUPTHER

## 2019-03-11 RX ORDER — LOSARTAN POTASSIUM 25 MG/1
TABLET ORAL
Qty: 30 TABLET | Refills: 0 | Status: SHIPPED | OUTPATIENT
Start: 2019-03-11 | End: 2019-10-08

## 2019-03-12 ENCOUNTER — TELEPHONE (OUTPATIENT)
Dept: INTERNAL MEDICINE CLINIC | Age: 64
End: 2019-03-12

## 2019-03-12 DIAGNOSIS — E04.1 THYROID NODULE: Primary | ICD-10-CM

## 2019-03-12 RX ORDER — LOSARTAN POTASSIUM 25 MG/1
25 TABLET ORAL DAILY
Qty: 30 TABLET | Refills: 3 | Status: SHIPPED | OUTPATIENT
Start: 2019-03-12 | End: 2019-11-05 | Stop reason: CLARIF

## 2019-03-13 ENCOUNTER — PATIENT MESSAGE (OUTPATIENT)
Dept: INTERNAL MEDICINE CLINIC | Age: 64
End: 2019-03-13

## 2019-03-13 ENCOUNTER — TELEPHONE (OUTPATIENT)
Dept: INTERNAL MEDICINE CLINIC | Age: 64
End: 2019-03-13

## 2019-03-13 RX ORDER — ARIPIPRAZOLE 5 MG/1
5 TABLET ORAL DAILY
Qty: 30 TABLET | Refills: 3 | Status: SHIPPED | OUTPATIENT
Start: 2019-03-13 | End: 2019-10-08 | Stop reason: SINTOL

## 2019-03-19 ENCOUNTER — OFFICE VISIT (OUTPATIENT)
Dept: INTERNAL MEDICINE CLINIC | Age: 64
End: 2019-03-19
Payer: COMMERCIAL

## 2019-03-19 VITALS
TEMPERATURE: 97.9 F | HEIGHT: 62 IN | SYSTOLIC BLOOD PRESSURE: 130 MMHG | RESPIRATION RATE: 16 BRPM | DIASTOLIC BLOOD PRESSURE: 80 MMHG | BODY MASS INDEX: 37.87 KG/M2 | HEART RATE: 68 BPM | WEIGHT: 205.8 LBS

## 2019-03-19 DIAGNOSIS — E04.1 THYROID NODULE: ICD-10-CM

## 2019-03-19 DIAGNOSIS — F32.9 REACTIVE DEPRESSION: Primary | ICD-10-CM

## 2019-03-19 DIAGNOSIS — I10 ESSENTIAL HYPERTENSION: ICD-10-CM

## 2019-03-19 PROCEDURE — 99214 OFFICE O/P EST MOD 30 MIN: CPT | Performed by: INTERNAL MEDICINE

## 2019-03-19 ASSESSMENT — PATIENT HEALTH QUESTIONNAIRE - PHQ9
1. LITTLE INTEREST OR PLEASURE IN DOING THINGS: 0
SUM OF ALL RESPONSES TO PHQ QUESTIONS 1-9: 2
SUM OF ALL RESPONSES TO PHQ QUESTIONS 1-9: 2
SUM OF ALL RESPONSES TO PHQ9 QUESTIONS 1 & 2: 2
2. FEELING DOWN, DEPRESSED OR HOPELESS: 2

## 2019-03-20 ENCOUNTER — TELEPHONE (OUTPATIENT)
Dept: INTERNAL MEDICINE CLINIC | Age: 64
End: 2019-03-20

## 2019-03-24 ENCOUNTER — PATIENT MESSAGE (OUTPATIENT)
Dept: INTERNAL MEDICINE CLINIC | Age: 64
End: 2019-03-24

## 2019-03-27 ENCOUNTER — TELEPHONE (OUTPATIENT)
Dept: INTERNAL MEDICINE CLINIC | Age: 64
End: 2019-03-27

## 2019-03-27 DIAGNOSIS — R60.9 EDEMA, UNSPECIFIED TYPE: Primary | ICD-10-CM

## 2019-03-27 RX ORDER — SPIRONOLACTONE 25 MG/1
25 TABLET ORAL DAILY
Qty: 10 TABLET | Refills: 0 | Status: SHIPPED | OUTPATIENT
Start: 2019-03-27 | End: 2019-04-10 | Stop reason: SDUPTHER

## 2019-03-29 ENCOUNTER — PATIENT MESSAGE (OUTPATIENT)
Dept: INTERNAL MEDICINE CLINIC | Age: 64
End: 2019-03-29

## 2019-03-29 DIAGNOSIS — N39.0 URINARY TRACT INFECTION WITHOUT HEMATURIA, SITE UNSPECIFIED: Primary | ICD-10-CM

## 2019-04-01 ENCOUNTER — HOSPITAL ENCOUNTER (OUTPATIENT)
Age: 64
Setting detail: SPECIMEN
Discharge: HOME | End: 2019-04-01

## 2019-04-01 DIAGNOSIS — R60.9 EDEMA, UNSPECIFIED TYPE: ICD-10-CM

## 2019-04-01 LAB
ANION GAP SERPL CALCULATED.3IONS-SCNC: 16 MMOL/L (ref 9–17)
BUN BLDV-MCNC: 20 MG/DL (ref 8–23)
BUN/CREAT BLD: ABNORMAL (ref 9–20)
CALCIUM SERPL-MCNC: 9.7 MG/DL (ref 8.6–10.4)
CHLORIDE BLD-SCNC: 108 MMOL/L (ref 98–107)
CO2: 24 MMOL/L (ref 20–31)
CREAT SERPL-MCNC: 0.84 MG/DL (ref 0.5–0.9)
GFR AFRICAN AMERICAN: >60 ML/MIN
GFR NON-AFRICAN AMERICAN: >60 ML/MIN
GFR SERPL CREATININE-BSD FRML MDRD: ABNORMAL ML/MIN/{1.73_M2}
GFR SERPL CREATININE-BSD FRML MDRD: ABNORMAL ML/MIN/{1.73_M2}
GLUCOSE BLD-MCNC: 97 MG/DL (ref 70–99)
POTASSIUM SERPL-SCNC: 4.4 MMOL/L (ref 3.7–5.3)
SODIUM BLD-SCNC: 148 MMOL/L (ref 135–144)

## 2019-04-01 NOTE — TELEPHONE ENCOUNTER
From: Ralph Vigil  To: Nancy Prieto MD  Sent: 3/29/2019 10:35 PM EDT  Subject: Non-Urgent Medical Question    Dr. Marisabel Clark,  Can you send a urine test to the lab below your office? I am experiencing burning when urinating.      Thanks

## 2019-04-02 ENCOUNTER — HOSPITAL ENCOUNTER (OUTPATIENT)
Age: 64
Setting detail: SPECIMEN
Discharge: HOME | End: 2019-04-02

## 2019-04-02 ENCOUNTER — TELEPHONE (OUTPATIENT)
Dept: INTERNAL MEDICINE CLINIC | Age: 64
End: 2019-04-02

## 2019-04-02 DIAGNOSIS — N39.0 URINARY TRACT INFECTION WITHOUT HEMATURIA, SITE UNSPECIFIED: ICD-10-CM

## 2019-04-02 LAB
-: NORMAL
AMORPHOUS: NORMAL
BACTERIA: NORMAL
BILIRUBIN URINE: NEGATIVE
CASTS UA: NORMAL /LPF (ref 0–8)
COLOR: YELLOW
COMMENT UA: ABNORMAL
CRYSTALS, UA: NORMAL /HPF
EPITHELIAL CELLS UA: NORMAL /HPF (ref 0–5)
GLUCOSE URINE: NEGATIVE
KETONES, URINE: NEGATIVE
LEUKOCYTE ESTERASE, URINE: NEGATIVE
MUCUS: NORMAL
NITRITE, URINE: NEGATIVE
OTHER OBSERVATIONS UA: NORMAL
PH UA: 5.5 (ref 5–8)
PROTEIN UA: ABNORMAL
RBC UA: NORMAL /HPF (ref 0–4)
RENAL EPITHELIAL, UA: NORMAL /HPF
SPECIFIC GRAVITY UA: 1.02 (ref 1–1.03)
TRICHOMONAS: NORMAL
TURBIDITY: CLEAR
URINE HGB: ABNORMAL
UROBILINOGEN, URINE: NORMAL
WBC UA: NORMAL /HPF (ref 0–5)
YEAST: NORMAL

## 2019-04-02 NOTE — TELEPHONE ENCOUNTER
Pt notified UA negative    Asking for referral to new GYN, several phone numbers given as pt will be changing insurance and unsure who she can see

## 2019-04-03 LAB
CULTURE: NO GROWTH
Lab: NORMAL
SPECIMEN DESCRIPTION: NORMAL

## 2019-04-04 ENCOUNTER — TELEPHONE (OUTPATIENT)
Dept: INTERNAL MEDICINE CLINIC | Age: 64
End: 2019-04-04

## 2019-04-04 DIAGNOSIS — R79.89 ELEVATED LFTS: Primary | ICD-10-CM

## 2019-04-05 ENCOUNTER — HOSPITAL ENCOUNTER (OUTPATIENT)
Age: 64
Setting detail: SPECIMEN
Discharge: HOME | End: 2019-04-05

## 2019-04-05 DIAGNOSIS — R79.89 ELEVATED LFTS: ICD-10-CM

## 2019-04-05 LAB
ALT SERPL-CCNC: 24 U/L (ref 5–33)
AST SERPL-CCNC: 17 U/L

## 2019-04-10 DIAGNOSIS — R60.9 EDEMA, UNSPECIFIED TYPE: ICD-10-CM

## 2019-04-10 RX ORDER — SPIRONOLACTONE 25 MG/1
25 TABLET ORAL DAILY
Qty: 30 TABLET | Refills: 1 | Status: SHIPPED | OUTPATIENT
Start: 2019-04-10 | End: 2019-10-08 | Stop reason: ALTCHOICE

## 2019-04-10 NOTE — TELEPHONE ENCOUNTER
Pt states aldactone 25 mg one every morning worked well to keep swelling down. Pt is asking if she can continue to take one every morning? If yes, will need refill.   Was given #10 as trial 3/27/19

## 2019-04-24 ENCOUNTER — PATIENT MESSAGE (OUTPATIENT)
Dept: INTERNAL MEDICINE CLINIC | Age: 64
End: 2019-04-24

## 2019-05-02 ENCOUNTER — TELEPHONE (OUTPATIENT)
Dept: INTERNAL MEDICINE CLINIC | Age: 64
End: 2019-05-02

## 2019-05-02 ENCOUNTER — HOSPITAL ENCOUNTER (OUTPATIENT)
Age: 64
Setting detail: SPECIMEN
Discharge: HOME | End: 2019-05-02

## 2019-05-02 DIAGNOSIS — R35.0 URINARY FREQUENCY: Primary | ICD-10-CM

## 2019-05-02 DIAGNOSIS — R30.0 DYSURIA: ICD-10-CM

## 2019-05-02 DIAGNOSIS — R35.0 URINARY FREQUENCY: ICD-10-CM

## 2019-05-02 LAB
-: ABNORMAL
AMORPHOUS: ABNORMAL
BACTERIA: ABNORMAL
BILIRUBIN URINE: NEGATIVE
CASTS UA: ABNORMAL /LPF (ref 0–8)
COLOR: YELLOW
COMMENT UA: ABNORMAL
CRYSTALS, UA: ABNORMAL /HPF
EPITHELIAL CELLS UA: ABNORMAL /HPF (ref 0–5)
GLUCOSE URINE: NEGATIVE
KETONES, URINE: NEGATIVE
LEUKOCYTE ESTERASE, URINE: ABNORMAL
MUCUS: ABNORMAL
NITRITE, URINE: NEGATIVE
OTHER OBSERVATIONS UA: ABNORMAL
PH UA: 5.5 (ref 5–8)
PROTEIN UA: ABNORMAL
RBC UA: ABNORMAL /HPF (ref 0–4)
RENAL EPITHELIAL, UA: ABNORMAL /HPF
SPECIFIC GRAVITY UA: 1.01 (ref 1–1.03)
TRICHOMONAS: ABNORMAL
TURBIDITY: CLEAR
URINE HGB: ABNORMAL
UROBILINOGEN, URINE: NORMAL
WBC UA: ABNORMAL /HPF (ref 0–5)
YEAST: ABNORMAL

## 2019-05-02 NOTE — TELEPHONE ENCOUNTER
Pt called c/o urinary frequency/ buring/ cloudy urine since monday. asking to have order for Urine culture sent to lab downstairs. please advise. Had negative urine culture 4/1/19. Offered appt, declined due to no insurance coverage at present time.

## 2019-05-03 ENCOUNTER — PATIENT MESSAGE (OUTPATIENT)
Dept: INTERNAL MEDICINE CLINIC | Age: 64
End: 2019-05-03

## 2019-05-03 RX ORDER — CEPHALEXIN 500 MG/1
500 CAPSULE ORAL 3 TIMES DAILY
Qty: 21 CAPSULE | Refills: 0 | Status: SHIPPED | OUTPATIENT
Start: 2019-05-03 | End: 2019-05-10

## 2019-05-03 NOTE — TELEPHONE ENCOUNTER
From: Cassie Bello  To: Barrington Gilbert MD  Sent: 5/3/2019 8:14 AM EDT  Subject: Non-Urgent Medical Question    Doctor K,  I took the urine test yesterday. I am urinating very often. Also seeing some blood. With today being Friday is there anyway i can get some antibiotics today? Cipro does not do anything for me. I need a powerful bomb!!     Thanks

## 2019-05-04 LAB
CULTURE: ABNORMAL
Lab: ABNORMAL
SPECIMEN DESCRIPTION: ABNORMAL

## 2019-05-06 ENCOUNTER — TELEPHONE (OUTPATIENT)
Dept: INTERNAL MEDICINE CLINIC | Age: 64
End: 2019-05-06

## 2019-05-29 ENCOUNTER — PATIENT MESSAGE (OUTPATIENT)
Dept: INTERNAL MEDICINE CLINIC | Age: 64
End: 2019-05-29

## 2019-05-30 NOTE — TELEPHONE ENCOUNTER
From: Rosalee Arevalo  To: Esequiel White MD  Sent: 5/29/2019 9:43 PM EDT  Subject: Non-Urgent Medical Question    Dr. Scotty Reese. SHES BEEN SHORT OF BREATH FOR 2 WEEKS. I KNOW SHES IN PAIN FROM HER HIP WHICH I AM TAKING HER TOMORROW FOR HER MRI. CAN PAIN CAUSE SHORTNESS OF BREATH? SHE HAD HER HEART SHOCKED BACK INTO RHYTHM FRIDAY BUT ITS STILL RACING. 120 AT DIALYSIS TODAY. LET ME KNOW.  JUST CONCERNED

## 2019-07-01 RX ORDER — ESCITALOPRAM OXALATE 10 MG/1
TABLET ORAL
Qty: 90 TABLET | Refills: 0 | Status: SHIPPED | OUTPATIENT
Start: 2019-07-01 | End: 2019-10-02 | Stop reason: SDUPTHER

## 2019-07-12 NOTE — PROGRESS NOTES
HX OF KIDNEY STONE IN . HAS HAD  RECURRENT UTI     NO HEAD ACHES OR DIZZINESS. NO VISION CHANGES    NO LEG EDEMA. NO CLAUDICATION    NO PARESTHESIAS IN FEET    HAS HX DEPRESSION SINCE HER MOTHER AND SISTER  . TAKES LEXAPRO 10 MG DAILY    S/P TOTAL HYSTERECTOMY FOR ENDOMETRIOSIS. NO HOT FLASHES. HAS VAGINAL DRYNESS. TAKES OSPEMIFENE 60 MG DAILY ORALLY    HAS HX OF FIBRO MYALGIAS UPPER BACK PAIN  TAKES FLEXERIL PRN    CURRENT MEDS:     SITAGLIPTIN- METFORMIN    LOSARTAN, TENORMIN    LEXAPRO    FLEXERIL    OSPEMIFENE    PAST HX AND SURGERIES:     S/P TOTAL HYSTERECTOMY    S/P CHOLECYSTECTOMY    LEFT  AXILLARY BENIGN LUMP REMOVED    TONSILLECTOMY    KIDNEY STONE    NON DISPLACED RT ANKLE FRACTURE IN 2017      FAMILY HX:     SISTER HAS GOITER    AUNT HAD THYROID SURGERY FOR BENIGN GOITER    SOCIAL HX:     NO SMOKING    NO ALCOHOL    NO RECREATIONAL DRUGS    . HAS ONE SON      ALLERGIES:     BACTRIM, SULFA    MACROBID    MORPHINE      RECORDS FROM PCP REVIEWED. PAST HX, SURGICAL HX, FAMILY HX, SOCIAL HX AND ALLERGIES ALL REVIEWED.       Past Medical History:   Diagnosis Date    Anxiety     Chronic UTI     Fibromyalgia     Hematuria due to chronic cystitis     HTN (hypertension)     IBS (irritable bowel syndrome)     Obesity     Prediabetes     Tachycardia     Thyroid disease     nodules, hurshimoto disease    Unspecified sleep apnea     does not use machine      Past Surgical History:   Procedure Laterality Date    ABDOMEN SURGERY      abdominoplasty     BLEPHAROPLASTY      BREAST REDUCTION SURGERY      CHOLECYSTECTOMY      COLONOSCOPY  2013    10 yrs    CYST REMOVAL Bilateral     feet    FOOT TENDON SURGERY Left 12/2/15    HYSTERECTOMY, TOTAL ABDOMINAL      with BSO    TONSILLECTOMY AND ADENOIDECTOMY       Family History   Problem Relation Age of Onset    Heart Disease Mother     Parkinsonism Mother     Hypertension Mother     Cancer Sister         cervical/cervical [id number]    up to 11.9 mm. Right lobe of the thyroid gland measures 27.4 x 23.2 x 53.1 mm. There is a tiny hypoechoic nodule measuring approximately 4 mm. Left lobe of the thyroid gland measures 32.5 x 26.6 x 53.2 mm. Overall the gland is slightly    heterogeneous without increased color flow. There are complex heterogeneous nodules in the left lobe. The largest inferior measures up to 25.6 x 22.0 x 28.3 mm. A second nodule measures 10.3 x 5.9 x 8.7 mm. In the mid gland laterally there is a 17.9 x    13.4 x 16.1 mm nodule.       Impression:       Multinodular gland. Mildly enlarged heterogeneous thyroid gland with bilateral nodules. If clinically appropriate, ultrasound-guided fine needle aspiration biopsy of the 2 largest nodules on the left is suggested. 6/18/15 Procedure: Ultrasound-guided fine-needle aspiration of left thyroid lobe nodule. Surgical Pathology Report 06/18/2015  2:55 PM     THYROID, FINE NEEDLE ASPIRATION:         BENIGN       CONSISTENT WITH A BENIGN FOLLICULAR NODULE (INCLUDES ADENOMATOID   NODULE, COLLOID NODULE, ETC.)       Procedure:  AAM  May 16 2015  9:45AM   [id number] MRI CERVICAL WITHOUT    1. DEGENERATIVE CHANGES RESULT IN MODERATE CENTRAL CANAL STENOSIS AT C6/7    AND MILD CENTRAL CANAL STENOSIS AT C5/6 ALONG WITH VARYING DEGREES OF    NEURAL FORAMINAL NARROWING. DISC EXTRUSION PRESENT AT T3/4. SEE ABOVE FOR    FURTHER DESCRIPTION OF FINDINGS. 2. LEFT LOBE OF THYROID GLAND IS ENLARGED WITH MULTIPLE AREAS OF SIGNAL    ABNORMALITY. RECOMMEND FURTHER EVALUATION TO BEGIN WITH ULTRASOUND.         Mar 24 2015  5:23PM   [id number] CT ABD AND PELVIS WITH   IMPRESSION:    No evidence for small bowel obstruction. No evidence for    acute diverticulitis. Hepatic steatosis       Impression:     MULTINODULAR GOITER. S/P  FINE NEEDLE ASPIRATION  X 2 IN PAST. 6/8/15 AND  ? NOV 2018   BENIGN. EUTHYROID.   NEG. ANTIBODIES.  NO PRESSURE  SYMPTOMS    PRE DIABETES    HYPERTENSION    HX OF ACID REFLUX    SURGICAL

## 2019-07-17 ENCOUNTER — INITIAL CONSULT (OUTPATIENT)
Dept: ENDOCRINOLOGY | Age: 64
End: 2019-07-17
Payer: COMMERCIAL

## 2019-07-17 VITALS
HEIGHT: 62 IN | WEIGHT: 213 LBS | DIASTOLIC BLOOD PRESSURE: 60 MMHG | HEART RATE: 86 BPM | OXYGEN SATURATION: 93 % | SYSTOLIC BLOOD PRESSURE: 98 MMHG | TEMPERATURE: 97.7 F | BODY MASS INDEX: 39.2 KG/M2

## 2019-07-17 DIAGNOSIS — R73.03 PRE-DIABETES: ICD-10-CM

## 2019-07-17 DIAGNOSIS — E04.2 MULTINODULAR GOITER: Primary | ICD-10-CM

## 2019-07-17 DIAGNOSIS — Z87.442 HISTORY OF RENAL STONE: ICD-10-CM

## 2019-07-17 PROCEDURE — 99244 OFF/OP CNSLTJ NEW/EST MOD 40: CPT | Performed by: INTERNAL MEDICINE

## 2019-07-17 NOTE — PROGRESS NOTES
Chronic Disease Visit Information    BP Readings from Last 3 Encounters:   03/19/19 130/80   12/19/18 120/66   08/21/18 128/72          Hemoglobin A1C (%)   Date Value   12/19/2018 5.1   07/25/2018 5.8   06/04/2018 7.5     Microalb/Crt. Ratio (mcg/mg creat)   Date Value   07/25/2018 643 (H)     LDL Cholesterol (mg/dL)   Date Value   07/25/2018 103     HDL (mg/dL)   Date Value   07/25/2018 45     BUN (mg/dL)   Date Value   04/01/2019 20     CREATININE (mg/dL)   Date Value   04/01/2019 0.84     Glucose (mg/dL)   Date Value   04/01/2019 97   06/02/2012 96            Have you changed or started any medications since your last visit including any over-the-counter medicines, vitamins, or herbal medicines? no   Are you having any side effects from any of your medications? -  no  Have you stopped taking any of your medications? Is so, why? -  no    Have you seen any other physician or provider since your last visit? No  Have you had any other diagnostic tests since your last visit? No  Have you been seen in the emergency room and/or had an admission to a hospital since we last saw you? No  Have you had your annual diabetic retinal (eye) exam? No  Have you had your routine dental cleaning in the past 6 months? no    Have you activated your [employer] account? If not, what are your barriers?  Yes     Patient Care Team:  Josh Osman MD as PCP - General  Josh Osman MD as PCP - [facility] Provider         Medical History Review  Past Medical, Family, and Social History reviewed and does contribute to the patient presenting condition    Health Maintenance   Topic Date Due    Hepatitis C screen  1955    Pneumococcal 0-64 years Vaccine (1 of 1 - PPSV23) 07/23/1962    Diabetic foot exam  07/23/1966    Diabetic retinal exam  07/23/1966    HIV screen  07/23/1971    DTaP/Tdap/Td vaccine (1 - Tdap) 07/23/1975    Shingles Vaccine (1 of 2) 07/23/2006    Diabetic microalbuminuria test  07/25/2019    Lipid

## 2019-07-22 ENCOUNTER — HOSPITAL ENCOUNTER (OUTPATIENT)
Age: 64
Setting detail: SPECIMEN
Discharge: HOME | End: 2019-07-22
Payer: COMMERCIAL

## 2019-07-22 DIAGNOSIS — N39.0 URINARY TRACT INFECTION WITHOUT HEMATURIA, SITE UNSPECIFIED: ICD-10-CM

## 2019-07-22 DIAGNOSIS — N39.0 URINARY TRACT INFECTION WITHOUT HEMATURIA, SITE UNSPECIFIED: Primary | ICD-10-CM

## 2019-07-24 LAB
CULTURE: ABNORMAL
Lab: ABNORMAL
SPECIMEN DESCRIPTION: ABNORMAL

## 2019-07-25 ENCOUNTER — TELEPHONE (OUTPATIENT)
Dept: INTERNAL MEDICINE CLINIC | Age: 64
End: 2019-07-25

## 2019-07-25 DIAGNOSIS — N39.0 URINARY TRACT INFECTION WITHOUT HEMATURIA, SITE UNSPECIFIED: Primary | ICD-10-CM

## 2019-07-25 NOTE — TELEPHONE ENCOUNTER
----- Message from Filemon Chapman MD sent at 7/25/2019 11:00 AM EDT -----  Sae Luis to order urinalysis culture and sensitivity after completing the antibiotic  Also would advise patient to see urology for evaluation of recurrent UTIs

## 2019-08-09 ENCOUNTER — OFFICE VISIT (OUTPATIENT)
Dept: OBGYN CLINIC | Age: 64
End: 2019-08-09
Payer: COMMERCIAL

## 2019-08-09 VITALS
WEIGHT: 212 LBS | SYSTOLIC BLOOD PRESSURE: 131 MMHG | HEART RATE: 86 BPM | BODY MASS INDEX: 39.01 KG/M2 | DIASTOLIC BLOOD PRESSURE: 78 MMHG | HEIGHT: 62 IN

## 2019-08-09 DIAGNOSIS — Z12.39 SCREENING FOR BREAST CANCER: Primary | ICD-10-CM

## 2019-08-09 PROCEDURE — 99396 PREV VISIT EST AGE 40-64: CPT | Performed by: OBSTETRICS & GYNECOLOGY

## 2019-08-09 ASSESSMENT — ENCOUNTER SYMPTOMS
SHORTNESS OF BREATH: 0
COUGH: 0
BACK PAIN: 0
ABDOMINAL PAIN: 0

## 2019-08-09 NOTE — PROGRESS NOTES
by mouth daily 30 tablet 3    losartan (COZAAR) 25 MG tablet Take 1 tablet by mouth daily 30 tablet 3     No current facility-administered medications for this visit. Allergies:    Allergies   Allergen Reactions    Bactrim [Sulfamethoxazole-Trimethoprim] Hives    Macrobid [Nitrofurantoin] Other (See Comments)     Numb lips    Sulfa Antibiotics     Morphine Nausea And Vomiting     Past Medical History:   Past Medical History:   Diagnosis Date    Anxiety     Chronic UTI     Fibromyalgia     Hematuria due to chronic cystitis     HTN (hypertension)     IBS (irritable bowel syndrome)     Obesity     Prediabetes     Tachycardia     Thyroid disease     nodules, hurshimoto disease    Unspecified sleep apnea     does not use machine     Past Surgical History:   Past Surgical History:   Procedure Laterality Date    ABDOMEN SURGERY      abdominoplasty     BLEPHAROPLASTY      BREAST REDUCTION SURGERY      CHOLECYSTECTOMY      COLONOSCOPY      10 yrs    CYST REMOVAL Bilateral     feet    FOOT TENDON SURGERY Left 12/2/15    HYSTERECTOMY, TOTAL ABDOMINAL      with BSO    TONSILLECTOMY AND ADENOIDECTOMY       Obstetric History:   2  Para 1  Gynecologic History: LMP postmenopausal  Last Pap:        Any history of abnormal paps no    PriorColpo/Biopsy n/a     Last Mammogram 19  Result  normal  Contraception: postmenopausal  Complications: none  STDs: none  Psychosocial History: Occupation:      Caffeine Yes    At risk for depression Yes    Abuse:   No  Seatbelt:   Yes  Exercise:  No    Social History     Socioeconomic History    Marital status:      Spouse name: Not on file    Number of children: Not on file    Years of education: Not on file    Highest education level: Not on file   Occupational History    Not on file   Social Needs    Financial resource strain: Not on file    Food insecurity:     Worry: Not on file     Inability: Not on file   [facility]

## 2019-09-04 ENCOUNTER — TELEPHONE (OUTPATIENT)
Dept: INTERNAL MEDICINE CLINIC | Age: 64
End: 2019-09-04

## 2019-09-04 ENCOUNTER — HOSPITAL ENCOUNTER (OUTPATIENT)
Age: 64
Setting detail: SPECIMEN
Discharge: HOME | End: 2019-09-04
Payer: COMMERCIAL

## 2019-09-04 DIAGNOSIS — Z87.442 HISTORY OF RENAL STONE: ICD-10-CM

## 2019-09-04 DIAGNOSIS — R73.03 PRE-DIABETES: ICD-10-CM

## 2019-09-04 LAB
ALBUMIN SERPL-MCNC: 4.4 G/DL (ref 3.5–5.2)
ALBUMIN/GLOBULIN RATIO: 1.5 (ref 1–2.5)
ALP BLD-CCNC: 49 U/L (ref 35–104)
ALT SERPL-CCNC: 28 U/L (ref 5–33)
ANION GAP SERPL CALCULATED.3IONS-SCNC: 11 MMOL/L (ref 9–17)
ANION GAP SERPL CALCULATED.3IONS-SCNC: 11 MMOL/L (ref 9–17)
AST SERPL-CCNC: 22 U/L
BILIRUB SERPL-MCNC: 0.41 MG/DL (ref 0.3–1.2)
BILIRUBIN DIRECT: 0.1 MG/DL
BILIRUBIN, INDIRECT: 0.31 MG/DL (ref 0–1)
BUN BLDV-MCNC: 21 MG/DL (ref 8–23)
BUN BLDV-MCNC: 21 MG/DL (ref 8–23)
BUN/CREAT BLD: ABNORMAL (ref 9–20)
BUN/CREAT BLD: ABNORMAL (ref 9–20)
CALCIUM SERPL-MCNC: 9.3 MG/DL (ref 8.6–10.4)
CALCIUM SERPL-MCNC: 9.3 MG/DL (ref 8.6–10.4)
CHLORIDE BLD-SCNC: 107 MMOL/L (ref 98–107)
CHLORIDE BLD-SCNC: 107 MMOL/L (ref 98–107)
CHOLESTEROL, FASTING: 170 MG/DL
CHOLESTEROL/HDL RATIO: 3.7
CHOLESTEROL/HDL RATIO: 3.7
CHOLESTEROL: 170 MG/DL
CO2: 25 MMOL/L (ref 20–31)
CO2: 25 MMOL/L (ref 20–31)
CREAT SERPL-MCNC: 0.83 MG/DL (ref 0.5–0.9)
CREAT SERPL-MCNC: 0.83 MG/DL (ref 0.5–0.9)
GFR AFRICAN AMERICAN: >60 ML/MIN
GFR AFRICAN AMERICAN: >60 ML/MIN
GFR NON-AFRICAN AMERICAN: >60 ML/MIN
GFR NON-AFRICAN AMERICAN: >60 ML/MIN
GFR SERPL CREATININE-BSD FRML MDRD: ABNORMAL ML/MIN/{1.73_M2}
GLOBULIN: NORMAL G/DL (ref 1.5–3.8)
GLUCOSE BLD-MCNC: 103 MG/DL (ref 70–99)
GLUCOSE FASTING: 103 MG/DL (ref 70–99)
HDLC SERPL-MCNC: 46 MG/DL
HDLC SERPL-MCNC: 46 MG/DL
HEPATITIS C ANTIBODY: NONREACTIVE
LDL CHOLESTEROL: 85 MG/DL (ref 0–130)
LDL CHOLESTEROL: 85 MG/DL (ref 0–130)
MAGNESIUM: 1.9 MG/DL (ref 1.6–2.6)
POTASSIUM SERPL-SCNC: 4.7 MMOL/L (ref 3.7–5.3)
POTASSIUM SERPL-SCNC: 4.7 MMOL/L (ref 3.7–5.3)
SODIUM BLD-SCNC: 143 MMOL/L (ref 135–144)
SODIUM BLD-SCNC: 143 MMOL/L (ref 135–144)
T3 FREE: 3.17 PG/ML (ref 2.02–4.43)
THYROXINE, FREE: 1.32 NG/DL (ref 0.93–1.7)
TOTAL PROTEIN: 7.3 G/DL (ref 6.4–8.3)
TRIGL SERPL-MCNC: 197 MG/DL
TRIGLYCERIDE, FASTING: 197 MG/DL
TSH SERPL DL<=0.05 MIU/L-ACNC: 1.29 MIU/L (ref 0.3–5)
URIC ACID: 6.5 MG/DL (ref 2.4–5.7)
VITAMIN B-12: 367 PG/ML (ref 232–1245)
VLDLC SERPL CALC-MCNC: ABNORMAL MG/DL (ref 1–30)
VLDLC SERPL CALC-MCNC: ABNORMAL MG/DL (ref 1–30)

## 2019-09-06 LAB — ZINC: 62.9 UG/DL (ref 60–120)

## 2019-09-13 ENCOUNTER — HOSPITAL ENCOUNTER (OUTPATIENT)
Age: 64
Setting detail: SPECIMEN
Discharge: HOME | End: 2019-09-13
Payer: COMMERCIAL

## 2019-09-13 ENCOUNTER — OFFICE VISIT (OUTPATIENT)
Dept: FAMILY MEDICINE CLINIC | Age: 64
End: 2019-09-13
Payer: COMMERCIAL

## 2019-09-13 VITALS
TEMPERATURE: 97.4 F | BODY MASS INDEX: 39.58 KG/M2 | WEIGHT: 216.4 LBS | HEART RATE: 81 BPM | OXYGEN SATURATION: 98 % | DIASTOLIC BLOOD PRESSURE: 70 MMHG | SYSTOLIC BLOOD PRESSURE: 102 MMHG

## 2019-09-13 DIAGNOSIS — M54.6 ACUTE LEFT-SIDED THORACIC BACK PAIN: Primary | ICD-10-CM

## 2019-09-13 PROCEDURE — 99213 OFFICE O/P EST LOW 20 MIN: CPT | Performed by: NURSE PRACTITIONER

## 2019-09-13 NOTE — PROGRESS NOTES
Vi Canchola, LISA-C  [address]  [id number] [facility]  [phone]  [phone]    Erasmo Riojas is a 62 y.o. female who is here with c/o of:    Chief Complaint: Back Pain (mid left X 1 week. trying ibuprofen and Tylenol. no UTI sx)      Patient Accompanied by: n/a    HPI - Erasmo Riojas is here today with c/o:    Patient is here with c/o left posterior lateral back pain. She has been taking ibuprofen and tylenol, and flexeril and this has helped only somewhat. She reports she has tenderness when she touches the area. She reports the symptoms started 1 week ago and states she is about 65% better today. She denies dysuria, frequency, and urgency. She reports she has had kidney stones in the past and this is a different pain. She reports radiation to her left hip.       Patient Active Problem List:     Tachycardia     HTN (hypertension)     Fibromyalgia     Chronic UTI     IBS (irritable bowel syndrome)     Obesity     Hypercholesterolemia     Chondromalacia patellae of right knee     Patellar contusion     Prediabetes     History of hysterectomy for benign disease     Vaginal atrophy     Past Medical History:   Diagnosis Date    Anxiety     Chronic UTI     Fibromyalgia     Hematuria due to chronic cystitis     HTN (hypertension)     IBS (irritable bowel syndrome)     Obesity     Prediabetes     Tachycardia     Thyroid disease     nodules, hurshimoto disease    Unspecified sleep apnea     does not use machine      Past Surgical History:   Procedure Laterality Date    ABDOMEN SURGERY      abdominoplasty 2002    BLEPHAROPLASTY      BREAST REDUCTION SURGERY      CHOLECYSTECTOMY      COLONOSCOPY  2013    10 yrs    CYST REMOVAL Bilateral     feet    FOOT TENDON SURGERY Left 12/2/15    HYSTERECTOMY, TOTAL ABDOMINAL      with BSO    TONSILLECTOMY AND ADENOIDECTOMY       Family History   Problem Relation Age of Onset    Heart Disease Mother    Rogelio Doris Parkinsonism Mother     Hypertension Mother     Cancer Sister         cervical/cervical 80s    Hypertension Sister     High Blood Pressure Other     Hypertension Brother     Elevated Lipids Brother     Other Brother         stents     Social History     Tobacco Use    Smoking status: Never Smoker    Smokeless tobacco: Never Used   Substance Use Topics    Alcohol use: No     Comment: rarely     ALLERGIES:    Allergies   Allergen Reactions    Bactrim [Sulfamethoxazole-Trimethoprim] Hives    Macrobid [Nitrofurantoin] Other (See Comments)     Numb lips    Sulfa Antibiotics     Morphine Nausea And Vomiting          Subjective     · Constitutional:  Negative for activity change, appetite change,unexpected weight change, chills, fever, and fatigue. · HENT: Negative for ear pain, sore throat,  Rhinorrhea, sinus pain, sinus pressure, congestion. · Eyes:  Negative for pain and discharge. · Respiratory:  Negative for chest tightness, shortness of breath, wheezing, and cough. · Cardiovascular:  Negative for chest pain, palpitations and leg swelling. · Gastrointestinal: Negative for abdominal pain, blood in stool, constipation,diarrhea, nausea and vomiting. · Endocrine: Negative for cold intolerance, heat intolerance, polydipsia, polyphagia and polyuria. · Genitourinary: Negative for difficulty urinating, dysuria, flank pain, frequency, hematuria and urgency. · Musculoskeletal: Negative for arthralgias, joint swelling, myalgias, neck pain and neck stiffness. Positive for back pain  · Skin: Negative for rash and wound. · Allergic/Immunologic: Negative for environmental allergies and food allergies. · Neurological:  Negative for dizziness, light-headedness, numbness and headaches. · Hematological:  Negative for adenopathy. Does not bruise/bleed easily. · Psychiatric/Behavioral: Negative for self-injury, sleep disturbance and suicidal ideas.      Objective     PHYSICAL EXAM:

## 2019-09-14 ENCOUNTER — PATIENT MESSAGE (OUTPATIENT)
Dept: INTERNAL MEDICINE CLINIC | Age: 64
End: 2019-09-14

## 2019-09-14 DIAGNOSIS — M54.9 MID BACK PAIN: Primary | ICD-10-CM

## 2019-09-19 ENCOUNTER — HOSPITAL ENCOUNTER (OUTPATIENT)
Dept: GENERAL RADIOLOGY | Age: 64
Discharge: HOME | End: 2019-09-21
Payer: COMMERCIAL

## 2019-09-19 ENCOUNTER — HOSPITAL ENCOUNTER (OUTPATIENT)
Age: 64
Discharge: HOME | End: 2019-09-21
Payer: COMMERCIAL

## 2019-09-19 DIAGNOSIS — M54.9 MID BACK PAIN: ICD-10-CM

## 2019-09-19 LAB — SURGICAL PATHOLOGY REPORT: NORMAL

## 2019-09-19 PROCEDURE — 72072 X-RAY EXAM THORAC SPINE 3VWS: CPT

## 2019-10-02 ENCOUNTER — TELEPHONE (OUTPATIENT)
Dept: INTERNAL MEDICINE CLINIC | Age: 64
End: 2019-10-02

## 2019-10-02 RX ORDER — ESCITALOPRAM OXALATE 10 MG/1
TABLET ORAL
Qty: 30 TABLET | Refills: 0 | Status: SHIPPED | OUTPATIENT
Start: 2019-10-02 | End: 2019-11-03 | Stop reason: SDUPTHER

## 2019-10-03 ENCOUNTER — TELEPHONE (OUTPATIENT)
Dept: INTERNAL MEDICINE CLINIC | Age: 64
End: 2019-10-03

## 2019-10-08 ENCOUNTER — OFFICE VISIT (OUTPATIENT)
Dept: PSYCHOLOGY | Age: 64
End: 2019-10-08
Payer: COMMERCIAL

## 2019-10-08 ENCOUNTER — OFFICE VISIT (OUTPATIENT)
Dept: INTERNAL MEDICINE CLINIC | Age: 64
End: 2019-10-08
Payer: COMMERCIAL

## 2019-10-08 VITALS
TEMPERATURE: 97.4 F | RESPIRATION RATE: 16 BRPM | SYSTOLIC BLOOD PRESSURE: 132 MMHG | WEIGHT: 213 LBS | BODY MASS INDEX: 39.2 KG/M2 | HEIGHT: 62 IN | HEART RATE: 88 BPM | DIASTOLIC BLOOD PRESSURE: 76 MMHG

## 2019-10-08 DIAGNOSIS — R10.30 LOWER ABDOMINAL PAIN: Primary | ICD-10-CM

## 2019-10-08 DIAGNOSIS — F43.21 GRIEF REACTION: ICD-10-CM

## 2019-10-08 DIAGNOSIS — M79.10 MYALGIA: ICD-10-CM

## 2019-10-08 DIAGNOSIS — F32.9 MAJOR DEPRESSIVE DISORDER, SINGLE EPISODE WITH ANXIOUS DISTRESS: ICD-10-CM

## 2019-10-08 DIAGNOSIS — F32.A DEPRESSION, UNSPECIFIED DEPRESSION TYPE: ICD-10-CM

## 2019-10-08 LAB
BILIRUBIN, POC: NORMAL
BLOOD URINE, POC: NORMAL
CLARITY, POC: CLEAR
COLOR, POC: YELLOW
GLUCOSE URINE, POC: NORMAL
KETONES, POC: NORMAL
LEUKOCYTE EST, POC: NORMAL
NITRITE, POC: NORMAL
PH, POC: 6
PROTEIN, POC: 300
SPECIFIC GRAVITY, POC: 1.03
UROBILINOGEN, POC: 0.2

## 2019-10-08 PROCEDURE — 81002 URINALYSIS NONAUTO W/O SCOPE: CPT | Performed by: INTERNAL MEDICINE

## 2019-10-08 PROCEDURE — 90791 PSYCH DIAGNOSTIC EVALUATION: CPT | Performed by: PSYCHOLOGIST

## 2019-10-08 PROCEDURE — 99214 OFFICE O/P EST MOD 30 MIN: CPT | Performed by: INTERNAL MEDICINE

## 2019-10-08 RX ORDER — TRAZODONE HYDROCHLORIDE 50 MG/1
50 TABLET ORAL NIGHTLY
Qty: 30 TABLET | Refills: 0 | Status: SHIPPED | OUTPATIENT
Start: 2019-10-08 | End: 2019-11-14 | Stop reason: SDUPTHER

## 2019-10-09 ENCOUNTER — TELEPHONE (OUTPATIENT)
Dept: INTERNAL MEDICINE CLINIC | Age: 64
End: 2019-10-09

## 2019-10-09 RX ORDER — METRONIDAZOLE 500 MG/1
500 TABLET ORAL 3 TIMES DAILY
Qty: 21 TABLET | Refills: 0 | Status: SHIPPED | OUTPATIENT
Start: 2019-10-09 | End: 2019-10-16

## 2019-10-12 ENCOUNTER — HOSPITAL ENCOUNTER (OUTPATIENT)
Dept: CT IMAGING | Age: 64
Discharge: HOME | End: 2019-10-14
Payer: COMMERCIAL

## 2019-10-12 ENCOUNTER — HOSPITAL ENCOUNTER (OUTPATIENT)
Dept: MAMMOGRAPHY | Age: 64
Discharge: HOME | End: 2019-10-14
Payer: COMMERCIAL

## 2019-10-12 DIAGNOSIS — F32.A DEPRESSION, UNSPECIFIED DEPRESSION TYPE: ICD-10-CM

## 2019-10-12 DIAGNOSIS — R10.30 LOWER ABDOMINAL PAIN: ICD-10-CM

## 2019-10-12 DIAGNOSIS — Z12.39 SCREENING FOR BREAST CANCER: ICD-10-CM

## 2019-10-12 LAB
CREAT SERPL-MCNC: 1.15 MG/DL (ref 0.5–0.9)
GFR AFRICAN AMERICAN: 58 ML/MIN
GFR NON-AFRICAN AMERICAN: 48 ML/MIN
GFR SERPL CREATININE-BSD FRML MDRD: ABNORMAL ML/MIN/{1.73_M2}
GFR SERPL CREATININE-BSD FRML MDRD: ABNORMAL ML/MIN/{1.73_M2}

## 2019-10-12 PROCEDURE — 74176 CT ABD & PELVIS W/O CONTRAST: CPT

## 2019-10-12 PROCEDURE — 36415 COLL VENOUS BLD VENIPUNCTURE: CPT

## 2019-10-12 PROCEDURE — 6360000004 HC RX CONTRAST MEDICATION: Performed by: INTERNAL MEDICINE

## 2019-10-12 PROCEDURE — 82565 ASSAY OF CREATININE: CPT

## 2019-10-12 PROCEDURE — 77063 BREAST TOMOSYNTHESIS BI: CPT

## 2019-10-12 RX ORDER — SODIUM CHLORIDE 0.9 % (FLUSH) 0.9 %
10 SYRINGE (ML) INJECTION
Status: DISCONTINUED | OUTPATIENT
Start: 2019-10-12 | End: 2019-10-15 | Stop reason: HOSPADM

## 2019-10-12 RX ORDER — 0.9 % SODIUM CHLORIDE 0.9 %
80 INTRAVENOUS SOLUTION INTRAVENOUS ONCE
Status: DISCONTINUED | OUTPATIENT
Start: 2019-10-12 | End: 2019-10-15 | Stop reason: HOSPADM

## 2019-10-12 RX ADMIN — IOHEXOL 30 ML: 300 INJECTION, SOLUTION INTRAVENOUS at 08:35

## 2019-10-13 ENCOUNTER — PATIENT MESSAGE (OUTPATIENT)
Dept: INTERNAL MEDICINE CLINIC | Age: 64
End: 2019-10-13

## 2019-10-15 ENCOUNTER — PATIENT MESSAGE (OUTPATIENT)
Dept: INTERNAL MEDICINE CLINIC | Age: 64
End: 2019-10-15

## 2019-10-15 DIAGNOSIS — N28.9 KIDNEY LESION: Primary | ICD-10-CM

## 2019-10-17 DIAGNOSIS — E11.9 TYPE 2 DIABETES MELLITUS WITHOUT COMPLICATION, WITHOUT LONG-TERM CURRENT USE OF INSULIN (HCC): ICD-10-CM

## 2019-10-18 ENCOUNTER — PATIENT MESSAGE (OUTPATIENT)
Dept: INTERNAL MEDICINE CLINIC | Age: 64
End: 2019-10-18

## 2019-10-18 DIAGNOSIS — I10 ESSENTIAL HYPERTENSION: Primary | ICD-10-CM

## 2019-10-18 DIAGNOSIS — E78.00 HYPERCHOLESTEROLEMIA: ICD-10-CM

## 2019-11-04 RX ORDER — IBUPROFEN 800 MG/1
800 TABLET ORAL EVERY 6 HOURS PRN
Qty: 60 TABLET | Refills: 0 | Status: SHIPPED | OUTPATIENT
Start: 2019-11-04 | End: 2022-01-31

## 2019-11-04 RX ORDER — ESCITALOPRAM OXALATE 10 MG/1
TABLET ORAL
Qty: 90 TABLET | Refills: 0 | Status: SHIPPED | OUTPATIENT
Start: 2019-11-04 | End: 2020-01-27

## 2019-11-04 RX ORDER — ESCITALOPRAM OXALATE 10 MG/1
TABLET ORAL
Qty: 30 TABLET | Refills: 2 | Status: SHIPPED | OUTPATIENT
Start: 2019-11-04 | End: 2019-11-04 | Stop reason: SDUPTHER

## 2019-11-04 RX ORDER — ATENOLOL 50 MG/1
TABLET ORAL
Qty: 180 TABLET | Refills: 0 | Status: SHIPPED | OUTPATIENT
Start: 2019-11-04 | End: 2020-01-27

## 2019-11-05 ENCOUNTER — HOSPITAL ENCOUNTER (OUTPATIENT)
Age: 64
Setting detail: SPECIMEN
Discharge: HOME | End: 2019-11-05
Payer: COMMERCIAL

## 2019-11-05 ENCOUNTER — NURSE ONLY (OUTPATIENT)
Dept: INTERNAL MEDICINE CLINIC | Age: 64
End: 2019-11-05

## 2019-11-05 ENCOUNTER — TELEPHONE (OUTPATIENT)
Dept: INTERNAL MEDICINE CLINIC | Age: 64
End: 2019-11-05

## 2019-11-05 VITALS — SYSTOLIC BLOOD PRESSURE: 168 MMHG | DIASTOLIC BLOOD PRESSURE: 90 MMHG

## 2019-11-05 DIAGNOSIS — I10 ESSENTIAL HYPERTENSION: Primary | ICD-10-CM

## 2019-11-05 DIAGNOSIS — I10 ESSENTIAL HYPERTENSION: ICD-10-CM

## 2019-11-05 LAB
ANION GAP SERPL CALCULATED.3IONS-SCNC: 18 MMOL/L (ref 9–17)
BUN BLDV-MCNC: 17 MG/DL (ref 8–23)
BUN/CREAT BLD: ABNORMAL (ref 9–20)
CALCIUM SERPL-MCNC: 9.8 MG/DL (ref 8.6–10.4)
CHLORIDE BLD-SCNC: 105 MMOL/L (ref 98–107)
CO2: 23 MMOL/L (ref 20–31)
CREAT SERPL-MCNC: 0.87 MG/DL (ref 0.5–0.9)
GFR AFRICAN AMERICAN: >60 ML/MIN
GFR NON-AFRICAN AMERICAN: >60 ML/MIN
GFR SERPL CREATININE-BSD FRML MDRD: ABNORMAL ML/MIN/{1.73_M2}
GFR SERPL CREATININE-BSD FRML MDRD: ABNORMAL ML/MIN/{1.73_M2}
GLUCOSE BLD-MCNC: 98 MG/DL (ref 70–99)
POTASSIUM SERPL-SCNC: 4.1 MMOL/L (ref 3.7–5.3)
SODIUM BLD-SCNC: 146 MMOL/L (ref 135–144)

## 2019-11-05 RX ORDER — LOSARTAN POTASSIUM 100 MG/1
100 TABLET ORAL DAILY
COMMUNITY
End: 2021-09-13

## 2019-11-07 ENCOUNTER — NURSE ONLY (OUTPATIENT)
Dept: INTERNAL MEDICINE CLINIC | Age: 64
End: 2019-11-07

## 2019-11-07 ENCOUNTER — HOSPITAL ENCOUNTER (OUTPATIENT)
Age: 64
Setting detail: SPECIMEN
Discharge: HOME | End: 2019-11-07
Payer: COMMERCIAL

## 2019-11-07 VITALS — DIASTOLIC BLOOD PRESSURE: 84 MMHG | SYSTOLIC BLOOD PRESSURE: 130 MMHG

## 2019-11-07 DIAGNOSIS — R30.0 DYSURIA: Primary | ICD-10-CM

## 2019-11-07 DIAGNOSIS — R30.0 DYSURIA: ICD-10-CM

## 2019-11-07 DIAGNOSIS — E11.9 TYPE 2 DIABETES MELLITUS WITHOUT COMPLICATION, WITHOUT LONG-TERM CURRENT USE OF INSULIN (HCC): ICD-10-CM

## 2019-11-07 LAB
-: NORMAL
AMORPHOUS: NORMAL
BACTERIA: NORMAL
BILIRUBIN URINE: NEGATIVE
CASTS UA: NORMAL /LPF (ref 0–2)
COLOR: YELLOW
COMMENT UA: ABNORMAL
CRYSTALS, UA: NORMAL /HPF
EPITHELIAL CELLS UA: NORMAL /HPF (ref 0–5)
GLUCOSE URINE: NEGATIVE
KETONES, URINE: NEGATIVE
LEUKOCYTE ESTERASE, URINE: ABNORMAL
MUCUS: NORMAL
NITRITE, URINE: NEGATIVE
OTHER OBSERVATIONS UA: NORMAL
PH UA: 5.5 (ref 5–8)
PROTEIN UA: ABNORMAL
RBC UA: NORMAL /HPF (ref 0–2)
RENAL EPITHELIAL, UA: NORMAL /HPF
SPECIFIC GRAVITY UA: 1.02 (ref 1–1.03)
TRICHOMONAS: NORMAL
TURBIDITY: ABNORMAL
URINE HGB: ABNORMAL
UROBILINOGEN, URINE: NORMAL
WBC UA: NORMAL /HPF (ref 0–5)
YEAST: NORMAL

## 2019-11-08 LAB
CULTURE: NORMAL
Lab: NORMAL
SPECIMEN DESCRIPTION: NORMAL

## 2019-11-08 RX ORDER — SITAGLIPTIN AND METFORMIN HYDROCHLORIDE 500; 50 MG/1; MG/1
TABLET, FILM COATED ORAL
Qty: 60 TABLET | Refills: 2 | Status: SHIPPED | OUTPATIENT
Start: 2019-11-08 | End: 2020-02-11

## 2019-11-14 DIAGNOSIS — F43.21 GRIEF REACTION: ICD-10-CM

## 2019-11-14 DIAGNOSIS — M79.10 MYALGIA: ICD-10-CM

## 2019-11-14 DIAGNOSIS — R10.30 LOWER ABDOMINAL PAIN: ICD-10-CM

## 2019-11-14 DIAGNOSIS — F32.A DEPRESSION, UNSPECIFIED DEPRESSION TYPE: ICD-10-CM

## 2019-11-15 RX ORDER — TRAZODONE HYDROCHLORIDE 50 MG/1
50 TABLET ORAL NIGHTLY
Qty: 30 TABLET | Refills: 0 | Status: SHIPPED | OUTPATIENT
Start: 2019-11-15 | End: 2019-12-17 | Stop reason: SDUPTHER

## 2019-12-17 ENCOUNTER — PATIENT MESSAGE (OUTPATIENT)
Dept: INTERNAL MEDICINE CLINIC | Age: 64
End: 2019-12-17

## 2019-12-17 DIAGNOSIS — F32.A DEPRESSION, UNSPECIFIED DEPRESSION TYPE: ICD-10-CM

## 2019-12-17 DIAGNOSIS — R10.30 LOWER ABDOMINAL PAIN: ICD-10-CM

## 2019-12-17 DIAGNOSIS — M79.10 MYALGIA: ICD-10-CM

## 2019-12-17 DIAGNOSIS — F43.21 GRIEF REACTION: ICD-10-CM

## 2019-12-17 RX ORDER — TRAZODONE HYDROCHLORIDE 50 MG/1
50 TABLET ORAL NIGHTLY
Qty: 30 TABLET | Refills: 0 | Status: SHIPPED | OUTPATIENT
Start: 2019-12-17 | End: 2020-01-20

## 2019-12-18 RX ORDER — LOSARTAN POTASSIUM 100 MG/1
100 TABLET ORAL DAILY
Qty: 90 TABLET | Refills: 1 | Status: SHIPPED | OUTPATIENT
Start: 2019-12-18 | End: 2020-05-04

## 2019-12-18 RX ORDER — LOSARTAN POTASSIUM 100 MG/1
100 TABLET ORAL DAILY
Qty: 90 TABLET | Refills: 0 | OUTPATIENT
Start: 2019-12-18

## 2020-01-13 ENCOUNTER — HOSPITAL ENCOUNTER (OUTPATIENT)
Age: 65
Setting detail: SPECIMEN
Discharge: HOME | End: 2020-01-13
Payer: COMMERCIAL

## 2020-01-13 ENCOUNTER — TELEPHONE (OUTPATIENT)
Dept: INTERNAL MEDICINE CLINIC | Age: 65
End: 2020-01-13

## 2020-01-13 LAB
-: NORMAL
AMORPHOUS: NORMAL
BACTERIA: NORMAL
BILIRUBIN URINE: NEGATIVE
CASTS UA: NORMAL /LPF (ref 0–8)
COLOR: YELLOW
COMMENT UA: ABNORMAL
CRYSTALS, UA: NORMAL /HPF
EPITHELIAL CELLS UA: NORMAL /HPF (ref 0–5)
GLUCOSE URINE: NEGATIVE
KETONES, URINE: NEGATIVE
LEUKOCYTE ESTERASE, URINE: ABNORMAL
MUCUS: NORMAL
NITRITE, URINE: NEGATIVE
OTHER OBSERVATIONS UA: NORMAL
PH UA: 5 (ref 5–8)
PROTEIN UA: ABNORMAL
RBC UA: NORMAL /HPF (ref 0–4)
RENAL EPITHELIAL, UA: NORMAL /HPF
SPECIFIC GRAVITY UA: 1.01 (ref 1–1.03)
TRICHOMONAS: NORMAL
TURBIDITY: CLEAR
URINE HGB: NEGATIVE
UROBILINOGEN, URINE: NORMAL
WBC UA: NORMAL /HPF (ref 0–5)
YEAST: NORMAL

## 2020-01-13 NOTE — TELEPHONE ENCOUNTER
Patient calling to see if you could send a lab slip to the lab here in the building for her test for a UTI as she is having some burning and she gets them frequently. She would like to get it tested to see if it is in fact an UTi. She would like to go to the lab around 1 today.

## 2020-01-15 LAB
CULTURE: ABNORMAL
CULTURE: ABNORMAL
Lab: ABNORMAL
SPECIMEN DESCRIPTION: ABNORMAL

## 2020-01-15 RX ORDER — CEPHALEXIN 500 MG/1
500 CAPSULE ORAL 3 TIMES DAILY
Qty: 21 CAPSULE | Refills: 0 | Status: SHIPPED | OUTPATIENT
Start: 2020-01-15 | End: 2020-01-22

## 2020-01-20 RX ORDER — TRAZODONE HYDROCHLORIDE 50 MG/1
TABLET ORAL
Qty: 30 TABLET | Refills: 2 | Status: SHIPPED | OUTPATIENT
Start: 2020-01-20 | End: 2020-05-04

## 2020-01-22 ENCOUNTER — HOSPITAL ENCOUNTER (OUTPATIENT)
Age: 65
Setting detail: SPECIMEN
Discharge: HOME | End: 2020-01-22
Payer: COMMERCIAL

## 2020-01-23 LAB
C DIFFICILE TOXINS, PCR: NORMAL
SPECIMEN DESCRIPTION: NORMAL

## 2020-01-25 LAB — CALPROTECTIN, FECAL: 27 UG/G

## 2020-01-27 RX ORDER — ESCITALOPRAM OXALATE 10 MG/1
TABLET ORAL
Qty: 90 TABLET | Refills: 0 | Status: SHIPPED | OUTPATIENT
Start: 2020-01-27 | End: 2020-04-01

## 2020-01-27 RX ORDER — ATENOLOL 50 MG/1
TABLET ORAL
Qty: 180 TABLET | Refills: 0 | Status: SHIPPED | OUTPATIENT
Start: 2020-01-27 | End: 2020-05-04

## 2020-01-30 ENCOUNTER — PATIENT MESSAGE (OUTPATIENT)
Dept: INTERNAL MEDICINE CLINIC | Age: 65
End: 2020-01-30

## 2020-01-31 ENCOUNTER — TELEPHONE (OUTPATIENT)
Dept: INTERNAL MEDICINE CLINIC | Age: 65
End: 2020-01-31

## 2020-01-31 NOTE — TELEPHONE ENCOUNTER
From: Joseph Barrow  To: Sameera Martinez MD  Sent: 1/30/2020 10:05 PM EST  Subject: Non-Urgent Medical Question    Doctor K,  Can you do another urine test to be certain it's gone. Got done with meds sat. Of last week.

## 2020-02-11 RX ORDER — SITAGLIPTIN AND METFORMIN HYDROCHLORIDE 500; 50 MG/1; MG/1
TABLET, FILM COATED ORAL
Qty: 60 TABLET | Refills: 2 | Status: SHIPPED | OUTPATIENT
Start: 2020-02-11 | End: 2020-06-01

## 2020-02-19 ENCOUNTER — HOSPITAL ENCOUNTER (OUTPATIENT)
Age: 65
Setting detail: SPECIMEN
Discharge: HOME | End: 2020-02-19
Payer: COMMERCIAL

## 2020-02-20 LAB
CAMPYLOBACTER PCR: NORMAL
E COLI ENTEROTOXIGENIC PCR: NORMAL
PLESIOMONAS SHIGELLOIDES PCR: NORMAL
SALMONELLA PCR: NORMAL
SHIGATOXIN GENE PCR: NORMAL
SHIGELLA SP PCR: NORMAL
SPECIMEN DESCRIPTION: NORMAL
VIBRIO PCR: NORMAL
YERSINIA ENTEROCOLITICA PCR: NORMAL

## 2020-02-28 ENCOUNTER — HOSPITAL ENCOUNTER (OUTPATIENT)
Age: 65
Setting detail: SPECIMEN
Discharge: HOME | End: 2020-02-28
Payer: COMMERCIAL

## 2020-03-04 LAB — SURGICAL PATHOLOGY REPORT: NORMAL

## 2020-03-23 ENCOUNTER — TELEPHONE (OUTPATIENT)
Dept: INTERNAL MEDICINE CLINIC | Age: 65
End: 2020-03-23

## 2020-03-23 ENCOUNTER — HOSPITAL ENCOUNTER (OUTPATIENT)
Age: 65
Setting detail: SPECIMEN
Discharge: HOME | End: 2020-03-23
Payer: COMMERCIAL

## 2020-03-23 LAB
-: NORMAL
AMORPHOUS: NORMAL
BACTERIA: NORMAL
BILIRUBIN URINE: NEGATIVE
CASTS UA: NORMAL /LPF (ref 0–8)
COLOR: YELLOW
COMMENT UA: ABNORMAL
CRYSTALS, UA: NORMAL /HPF
EPITHELIAL CELLS UA: NORMAL /HPF (ref 0–5)
GLUCOSE URINE: NEGATIVE
KETONES, URINE: NEGATIVE
LEUKOCYTE ESTERASE, URINE: ABNORMAL
MUCUS: NORMAL
NITRITE, URINE: NEGATIVE
OTHER OBSERVATIONS UA: NORMAL
PH UA: 5 (ref 5–8)
PROTEIN UA: ABNORMAL
RBC UA: NORMAL /HPF (ref 0–4)
RENAL EPITHELIAL, UA: NORMAL /HPF
SPECIFIC GRAVITY UA: 1.02 (ref 1–1.03)
TRICHOMONAS: NORMAL
TURBIDITY: ABNORMAL
URINE HGB: ABNORMAL
UROBILINOGEN, URINE: NORMAL
WBC UA: NORMAL /HPF (ref 0–5)
YEAST: NORMAL

## 2020-03-24 ENCOUNTER — PATIENT MESSAGE (OUTPATIENT)
Dept: INTERNAL MEDICINE CLINIC | Age: 65
End: 2020-03-24

## 2020-03-24 LAB
CULTURE: NORMAL
Lab: NORMAL
SPECIMEN DESCRIPTION: NORMAL

## 2020-04-01 RX ORDER — ESCITALOPRAM OXALATE 10 MG/1
TABLET ORAL
Qty: 90 TABLET | Refills: 0 | Status: SHIPPED | OUTPATIENT
Start: 2020-04-01 | End: 2020-05-04

## 2020-05-04 RX ORDER — ATENOLOL 50 MG/1
TABLET ORAL
Qty: 180 TABLET | Refills: 0 | Status: SHIPPED | OUTPATIENT
Start: 2020-05-04 | End: 2020-07-07

## 2020-05-04 RX ORDER — LOSARTAN POTASSIUM 100 MG/1
TABLET ORAL
Qty: 90 TABLET | Refills: 0 | Status: SHIPPED | OUTPATIENT
Start: 2020-05-04 | End: 2020-07-07

## 2020-05-04 RX ORDER — ESCITALOPRAM OXALATE 10 MG/1
TABLET ORAL
Qty: 90 TABLET | Refills: 0 | Status: SHIPPED | OUTPATIENT
Start: 2020-05-04 | End: 2020-11-03 | Stop reason: SDUPTHER

## 2020-05-04 RX ORDER — TRAZODONE HYDROCHLORIDE 50 MG/1
TABLET ORAL
Qty: 30 TABLET | Refills: 1 | Status: SHIPPED | OUTPATIENT
Start: 2020-05-04 | End: 2020-07-07

## 2020-05-11 ENCOUNTER — PATIENT MESSAGE (OUTPATIENT)
Dept: INTERNAL MEDICINE CLINIC | Age: 65
End: 2020-05-11

## 2020-05-12 RX ORDER — PANTOPRAZOLE SODIUM 20 MG/1
20 TABLET, DELAYED RELEASE ORAL
Qty: 30 TABLET | Refills: 2 | Status: SHIPPED | OUTPATIENT
Start: 2020-05-12 | End: 2020-06-16 | Stop reason: SINTOL

## 2020-05-27 ENCOUNTER — PATIENT MESSAGE (OUTPATIENT)
Dept: OBGYN CLINIC | Age: 65
End: 2020-05-27

## 2020-05-28 ENCOUNTER — PATIENT MESSAGE (OUTPATIENT)
Dept: OBGYN CLINIC | Age: 65
End: 2020-05-28

## 2020-05-28 NOTE — TELEPHONE ENCOUNTER
From: Rox Campoverde  To: Alex Merlos MD  Sent: 5/28/2020 6:10 AM EDT  Subject: Visit Follow-Up Question    There were two items. The scan and contrast. It was listed under wellness not Medical as it should of been. Read my first msg. Thanks      ----- Message -----   From:Marleny FRANK   Sent:5/27/2020 3:49 PM EDT   To:Kodak Martin   Subject:RE: Visit Follow-Up Question    I sent that too soon. However the Mammogram is a wellness exam and I was looking to see if something else was done also and from bouncing around the chart it sent my message. Sorry about the short message. If you can fax us a copy of the bill and the EOMB that you received that explains what they covered or not covered and the reason why our billers will need that. Fax number is [phone], If something is ordered as a screening or wellness and there is no other reason we are doing the tests then we can not change the diagnoses. Timur Nielsen      ----- Message -----   From:Kodak Martin   Sent:5/27/2020 3:01 PM EDT   To:Rochelle Deras MD   Subject:Visit Follow-Up Question    I recieved a bill from [address] for some testing you preformed on 10/12/2019. [employer] did not cover the scan nor the contrast since it was not coded properly. It was coded under wellness when it should of been coded under medical.  I got a bill for over 1000.00 of which I do not have. Please let [address] know this has to be resubmitted and [employer] will correct it on there end. My deductible was met for 2019 and therefore if coded correctly I would not of had a bill. Thank you.   [place]enny [place]  1955

## 2020-06-01 RX ORDER — SITAGLIPTIN AND METFORMIN HYDROCHLORIDE 500; 50 MG/1; MG/1
TABLET, FILM COATED ORAL
Qty: 60 TABLET | Refills: 0 | Status: SHIPPED | OUTPATIENT
Start: 2020-06-01 | End: 2020-07-07

## 2020-06-09 ENCOUNTER — PATIENT MESSAGE (OUTPATIENT)
Dept: OBGYN CLINIC | Age: 65
End: 2020-06-09

## 2020-06-10 NOTE — TELEPHONE ENCOUNTER
From: Miriam Ron  To: Romeo Wang MD  Sent: 6/9/2020 8:22 PM EDT  Subject: Non-Urgent Medical Question    I need an appt. Asap. My vaginal atrophy is real bad. [phone]. .please call me.

## 2020-06-16 ENCOUNTER — OFFICE VISIT (OUTPATIENT)
Dept: INTERNAL MEDICINE CLINIC | Age: 65
End: 2020-06-16
Payer: COMMERCIAL

## 2020-06-16 VITALS
WEIGHT: 211.6 LBS | TEMPERATURE: 97.2 F | HEART RATE: 68 BPM | RESPIRATION RATE: 16 BRPM | SYSTOLIC BLOOD PRESSURE: 136 MMHG | HEIGHT: 63 IN | BODY MASS INDEX: 37.49 KG/M2 | DIASTOLIC BLOOD PRESSURE: 74 MMHG

## 2020-06-16 PROCEDURE — 99213 OFFICE O/P EST LOW 20 MIN: CPT | Performed by: INTERNAL MEDICINE

## 2020-06-16 RX ORDER — PREDNISONE 10 MG/1
10 TABLET ORAL
Qty: 15 TABLET | Refills: 0 | Status: SHIPPED | OUTPATIENT
Start: 2020-06-16 | End: 2020-06-21

## 2020-06-16 RX ORDER — OMEPRAZOLE 40 MG/1
1 CAPSULE, DELAYED RELEASE ORAL DAILY
COMMUNITY
Start: 2019-08-26 | End: 2022-02-07

## 2020-06-22 NOTE — PROGRESS NOTES
Vinnie Gibson is a 62 y.o. female who presents for   Chief Complaint   Patient presents with    Shoulder Pain     pain from right wrist to elbow to shoulder to shoulder blade-to right side of neck- started on 6/13/20. no memory of injury, constant use of right hand on computer    Diabetes     , PM 98, never over 170    and follow up of chronic medical problems. Patient Active Problem List   Diagnosis    Tachycardia    HTN (hypertension)    Fibromyalgia    Chronic UTI    IBS (irritable bowel syndrome)    Obesity    Hypercholesterolemia    Chondromalacia patellae of right knee    Patellar contusion    Prediabetes    History of hysterectomy for benign disease    Vaginal atrophy    Major depressive disorder, single episode with anxious distress     HPI  Here for evaluation of right arm and neck pain going down her arm up to the wrist and denies any injury    Current Outpatient Medications   Medication Sig Dispense Refill    omeprazole (PRILOSEC) 40 MG delayed release capsule Take 1 capsule by mouth daily      JANUMET  MG per tablet Take one tablet by mouth two times a day with meals 60 tablet 0    traZODone (DESYREL) 50 MG tablet TAKE ONE TABLET BY MOUTH AT BEDTIME  30 tablet 1    escitalopram (LEXAPRO) 10 MG tablet TAKE ONE TABLET BY MOUTH ONE TIME DAILY  90 tablet 0    losartan (COZAAR) 100 MG tablet TAKE ONE TABLET BY MOUTH ONE TIME DAILY  90 tablet 0    atenolol (TENORMIN) 50 MG tablet TAKE ONE TABLET BY MOUTH TWICE DAILY  180 tablet 0    losartan (COZAAR) 100 MG tablet Take 100 mg by mouth daily      ibuprofen (IBU) 800 MG tablet Take 1 tablet by mouth every 6 hours as needed for Pain 60 tablet 0    blood glucose test strips (ONETOUCH VERIO) strip USE TWICE DAILY 100 strip 5    Estradiol (IMVEXXY MAINTENANCE PACK) 10 MCG INST Place 1 capsule vaginally daily for 7 days, THEN 1 capsule Twice a Week. 1 capsule daily.  15 each 0    sitaGLIPtan-metformin (JANUMET)  MG per tablet Take 1 tablet by mouth 2 times daily (with meals) (Patient taking differently: Take 1 tablet by mouth daily ) 28 tablet 0    cyclobenzaprine (FLEXERIL) 10 MG tablet take 1 tablet every 8 hours as needed for muscle spasms 90 tablet 0    Lancets MISC Use BID DX E11.9 dispense lancets covered by patients insurance 100 each 3    glucose monitoring kit (FREESTYLE) monitoring kit Test BID DX E11.9 dispense monitor covered by insurance 1 kit 0     No current facility-administered medications for this visit.         Allergies   Allergen Reactions    Contrast [Iodides] Hives     Pt had hives during an IVP    Bactrim [Sulfamethoxazole-Trimethoprim] Hives    Macrobid [Nitrofurantoin] Other (See Comments)     Numb lips    Sulfa Antibiotics     Morphine Nausea And Vomiting       Past Medical History:   Diagnosis Date    Anxiety     Chronic UTI     Fibromyalgia     Hematuria due to chronic cystitis     HTN (hypertension)     IBS (irritable bowel syndrome)     Major depressive disorder, single episode with anxious distress 10/8/2019    Obesity     Prediabetes     Tachycardia     Thyroid disease     nodules, hurshimoto disease    Unspecified sleep apnea     does not use machine       Past Surgical History:   Procedure Laterality Date    ABDOMEN SURGERY      abdominoplasty 2002    BLEPHAROPLASTY      BREAST REDUCTION SURGERY      CHOLECYSTECTOMY      COLONOSCOPY  2013    10 yrs    CYST REMOVAL Bilateral     feet    FOOT TENDON SURGERY Left 12/2/15    HYSTERECTOMY, TOTAL ABDOMINAL      with BSO    TONSILLECTOMY AND ADENOIDECTOMY         Family History   Problem Relation Age of Onset    Heart Disease Mother     Parkinsonism Mother     Hypertension Mother     Cancer Sister         cervical/cervical 1970s    Hypertension Sister     High Blood Pressure Other     Hypertension Brother     Elevated Lipids Brother     Other Brother         stents     ROS   Constitutional:  Negative for

## 2020-06-24 ENCOUNTER — PATIENT MESSAGE (OUTPATIENT)
Dept: INTERNAL MEDICINE CLINIC | Age: 65
End: 2020-06-24

## 2020-06-29 NOTE — TELEPHONE ENCOUNTER
From: Rogers Sanders  To: Pa Liriano MD  Sent: 6/24/2020 9:01 PM EDT  Subject: Non-Urgent Medical Question    Dr. Micky Rush,  I was scheduled for an MRI tonight. Since I was unable to remove my gold bracelets they would not do it. That was at [facility]. Are you able to order this test at [facility] for me. [address] only has 2 facilities that ur able to go to. Let me know.   Thanks

## 2020-07-01 ENCOUNTER — PATIENT MESSAGE (OUTPATIENT)
Dept: OBGYN CLINIC | Age: 65
End: 2020-07-01

## 2020-07-01 NOTE — TELEPHONE ENCOUNTER
From: Anna Wilson  To: Dylan Lua MD  Sent: 2020 7:55 AM EDT  Subject: Non-Urgent Medical Question    I apologize for not making my appt. Yesterday. I appreciate you trying to fit me in. I just could not make it. I do however need to reschedule. I am travelling to [facility] for my Father inlaws .. Tianna Grigsby i will take any appt. Available in July or Aug.  Thank u So much.

## 2020-07-06 ENCOUNTER — HOSPITAL ENCOUNTER (OUTPATIENT)
Age: 65
Setting detail: SPECIMEN
Discharge: HOME | End: 2020-07-06
Payer: COMMERCIAL

## 2020-07-06 LAB
ALBUMIN SERPL-MCNC: 4.1 G/DL (ref 3.5–5.2)
ALBUMIN/GLOBULIN RATIO: 1.7 (ref 1–2.5)
ALP BLD-CCNC: 45 U/L (ref 35–104)
ALT SERPL-CCNC: 39 U/L (ref 5–33)
ANION GAP SERPL CALCULATED.3IONS-SCNC: 16 MMOL/L (ref 9–17)
AST SERPL-CCNC: 24 U/L
BILIRUB SERPL-MCNC: 0.4 MG/DL (ref 0.3–1.2)
BUN BLDV-MCNC: 20 MG/DL (ref 8–23)
BUN/CREAT BLD: ABNORMAL (ref 9–20)
CALCIUM SERPL-MCNC: 8.9 MG/DL (ref 8.6–10.4)
CHLORIDE BLD-SCNC: 108 MMOL/L (ref 98–107)
CO2: 21 MMOL/L (ref 20–31)
CREAT SERPL-MCNC: 1.03 MG/DL (ref 0.5–0.9)
ESTIMATED AVERAGE GLUCOSE: 114 MG/DL
GFR AFRICAN AMERICAN: >60 ML/MIN
GFR NON-AFRICAN AMERICAN: 54 ML/MIN
GFR SERPL CREATININE-BSD FRML MDRD: ABNORMAL ML/MIN/{1.73_M2}
GFR SERPL CREATININE-BSD FRML MDRD: ABNORMAL ML/MIN/{1.73_M2}
GLUCOSE BLD-MCNC: 94 MG/DL (ref 70–99)
HBA1C MFR BLD: 5.6 % (ref 4–6)
POTASSIUM SERPL-SCNC: 4.3 MMOL/L (ref 3.7–5.3)
SODIUM BLD-SCNC: 145 MMOL/L (ref 135–144)
TOTAL PROTEIN: 6.5 G/DL (ref 6.4–8.3)
TSH SERPL DL<=0.05 MIU/L-ACNC: 0.9 MIU/L (ref 0.3–5)

## 2020-07-07 RX ORDER — LOSARTAN POTASSIUM 100 MG/1
TABLET ORAL
Qty: 90 TABLET | Refills: 5 | Status: SHIPPED | OUTPATIENT
Start: 2020-07-07 | End: 2020-08-11

## 2020-07-07 RX ORDER — SITAGLIPTIN AND METFORMIN HYDROCHLORIDE 500; 50 MG/1; MG/1
TABLET, FILM COATED ORAL
Qty: 60 TABLET | Refills: 5 | Status: SHIPPED
Start: 2020-07-07 | End: 2021-05-21 | Stop reason: ALTCHOICE

## 2020-07-07 RX ORDER — ATENOLOL 50 MG/1
TABLET ORAL
Qty: 180 TABLET | Refills: 5 | Status: SHIPPED | OUTPATIENT
Start: 2020-07-07 | End: 2021-08-23

## 2020-07-07 RX ORDER — TRAZODONE HYDROCHLORIDE 50 MG/1
TABLET ORAL
Qty: 30 TABLET | Refills: 5 | Status: SHIPPED | OUTPATIENT
Start: 2020-07-07 | End: 2021-03-30

## 2020-08-06 ENCOUNTER — TELEPHONE (OUTPATIENT)
Dept: INTERNAL MEDICINE CLINIC | Age: 65
End: 2020-08-06

## 2020-08-08 ENCOUNTER — PATIENT MESSAGE (OUTPATIENT)
Dept: INTERNAL MEDICINE CLINIC | Age: 65
End: 2020-08-08

## 2020-08-10 ENCOUNTER — TELEPHONE (OUTPATIENT)
Dept: INTERNAL MEDICINE CLINIC | Age: 65
End: 2020-08-10

## 2020-08-10 NOTE — TELEPHONE ENCOUNTER
From: Isabel Boyd  To: Mellisa Huff MD  Sent: 8/8/2020 12:56 PM EDT  Subject: Prescription Question    Huy Blayne,  I did not hear back from you on Friday and I am sure you were busy. I just need to know from Doctor K what I can do about the Diabetes medication. I know there are several on the market however Cyndi Muniz has really worked for me. I am waiting on my Cobra offer to come through from my previous employer but it hasnt yet. I have about a week left on the med. Please let me know what he thinks.   Thanks

## 2020-08-10 NOTE — TELEPHONE ENCOUNTER
Called pt back regarding meds, she said she had to stop metformin in the past due to awful diarrhea that was causing her to become dehydrated

## 2020-08-11 ENCOUNTER — VIRTUAL VISIT (OUTPATIENT)
Dept: INTERNAL MEDICINE CLINIC | Age: 65
End: 2020-08-11
Payer: COMMERCIAL

## 2020-08-11 ENCOUNTER — TELEPHONE (OUTPATIENT)
Dept: INTERNAL MEDICINE CLINIC | Age: 65
End: 2020-08-11

## 2020-08-11 PROCEDURE — 99441 PR PHYS/QHP TELEPHONE EVALUATION 5-10 MIN: CPT | Performed by: INTERNAL MEDICINE

## 2020-08-11 RX ORDER — SITAGLIPTIN AND METFORMIN HYDROCHLORIDE 1000; 50 MG/1; MG/1
1 TABLET, FILM COATED, EXTENDED RELEASE ORAL DAILY
Qty: 28 TABLET | Refills: 0 | COMMUNITY
Start: 2020-08-11 | End: 2020-12-09

## 2020-08-11 NOTE — TELEPHONE ENCOUNTER
When is her next appointment if not make an appointment so I can discuss all the medications and can choose 1 of them

## 2020-08-11 NOTE — PROGRESS NOTES
Kassie Mayo is a 60 y.o. female evaluated via telephone on 8/11/2020. Consent:  She and/or health care decision maker is aware that that she may receive a bill for this telephone service, depending on her insurance coverage, and has provided verbal consent to proceed: Yes      Documentation:  I communicated with the patient and/or health care decision maker about diabetes and medications and patient stated that she has no insurance and cannot afford Janumet and could not tolerate metformin because of diarrhea  Details of this discussion including any medical advice provided: And patient's recent hemoglobin A1c done last month reviewed which was 5.6 and her blood sugar was 94 and patient has 2 more weeks of Janumet left and I did advise patient that she can stop the medicine and monitor blood sugars or can continue and will give her some samples and I did give her samples of Janumet 50/thousand once a day which would be running for 4 more weeks and once patient gets her insurance in October can go back to her Janumet and if she cannot afford in between she can stop the medicine and monitor blood sugars and call me back if they get any higher and fasting less than 120 and postprandial not more than 160 and patient verbalized understanding      I affirm this is a Patient Initiated Episode with a Patient who has not had a related appointment within my department in the past 7 days or scheduled within the next 24 hours.     Patient identification was verified at the start of the visit: Yes    Total Time: minutes: 5-10 minutes    Note: not billable if this call serves to triage the patient into an appointment for the relevant concern      Tasha Montiel

## 2020-08-13 ENCOUNTER — TELEPHONE (OUTPATIENT)
Dept: INTERNAL MEDICINE CLINIC | Age: 65
End: 2020-08-13

## 2020-10-05 ENCOUNTER — OFFICE VISIT (OUTPATIENT)
Dept: OBGYN CLINIC | Age: 65
End: 2020-10-05
Payer: COMMERCIAL

## 2020-10-05 VITALS
HEIGHT: 63 IN | BODY MASS INDEX: 37.56 KG/M2 | WEIGHT: 212 LBS | DIASTOLIC BLOOD PRESSURE: 84 MMHG | SYSTOLIC BLOOD PRESSURE: 124 MMHG

## 2020-10-05 PROCEDURE — 99203 OFFICE O/P NEW LOW 30 MIN: CPT | Performed by: OBSTETRICS & GYNECOLOGY

## 2020-10-05 RX ORDER — ESTRADIOL 0.1 MG/G
2 CREAM VAGINAL DAILY
Qty: 1 TUBE | Refills: 3 | Status: SHIPPED | OUTPATIENT
Start: 2020-10-05 | End: 2020-12-09

## 2020-10-05 ASSESSMENT — ENCOUNTER SYMPTOMS
COUGH: 0
WHEEZING: 0
ABDOMINAL PAIN: 0
CONSTIPATION: 0
DIARRHEA: 0
NAUSEA: 0
VOMITING: 0

## 2020-10-05 NOTE — PROGRESS NOTES
[facility], [address] [place] [address]  DATE OF VISIT:  10/5/20    Reginald Orona    :  1955  CHIEF COMPLAINT:    Chief Complaint   Patient presents with    Established New Doctor     Here to discuss vaginal athrpohy         HPI :   Reginald Orona is a 60 y.o. female here to establish care as a new patient and to discuss vaginal atrophy. She underwent TREVIN/BSO at age 37 in [place] (where she was living at the time) for endometriosis and was on estratest until a few years ago, when she was advised to come off of it. Since then she reports intercourse is painful, and she has bleeding after. Hasn't tried anything topically, although she has been prescribed creams as well as the vaginal ring. Mammogram up to date (h/o breast reduction). Hot flushes daily.      Past Medical History:   Diagnosis Date    Anxiety     Chronic UTI     Fibromyalgia     Hematuria due to chronic cystitis     HTN (hypertension)     IBS (irritable bowel syndrome)     Major depressive disorder, single episode with anxious distress 10/8/2019    Obesity     Prediabetes     Tachycardia     Thyroid disease     nodules, hurshimoto disease    Unspecified sleep apnea     does not use machine      Past Surgical History:   Procedure Laterality Date    ABDOMEN SURGERY      abdominoplasty     BLEPHAROPLASTY      BREAST REDUCTION SURGERY      CHOLECYSTECTOMY      COLONOSCOPY      10 yrs    CYST REMOVAL Bilateral     feet    FOOT TENDON SURGERY Left 12/2/15    HYSTERECTOMY, TOTAL ABDOMINAL      with BSO    TONSILLECTOMY AND ADENOIDECTOMY       Family History   Problem Relation Age of Onset    Heart Disease Mother     Parkinsonism Mother     Hypertension Mother     Cancer Sister         cervical/cervical 1970s    Hypertension Sister     High Blood Pressure Other     Hypertension Brother     Elevated Lipids Brother     Other Brother         stents     Social History     Tobacco Use   Smoking Status Never Smoker   Smokeless Tobacco Never Used     Social History     Substance and Sexual Activity   Alcohol Use No    Comment: rarely     Current Outpatient Medications   Medication Sig Dispense Refill    estradiol (ESTRACE) 0.1 MG/GM vaginal cream Place 2 g vaginally daily Pt using twice weekly 1 Tube 3    atenolol (TENORMIN) 50 MG tablet TAKE ONE TABLET BY MOUTH TWICE DAILY  180 tablet 5    traZODone (DESYREL) 50 MG tablet TAKE ONE TABLET BY MOUTH AT BEDTIME  30 tablet 5    omeprazole (PRILOSEC) 40 MG delayed release capsule Take 1 capsule by mouth daily      escitalopram (LEXAPRO) 10 MG tablet TAKE ONE TABLET BY MOUTH ONE TIME DAILY  90 tablet 0    ibuprofen (IBU) 800 MG tablet Take 1 tablet by mouth every 6 hours as needed for Pain 60 tablet 0    cyclobenzaprine (FLEXERIL) 10 MG tablet take 1 tablet every 8 hours as needed for muscle spasms 90 tablet 0    glucose monitoring kit (FREESTYLE) monitoring kit Test BID DX E11.9 dispense monitor covered by insurance 1 kit 0    SITagliptin-metFORMIN (JANUMET XR)  MG TB24 per extended release tablet Take 1 tablet by mouth daily (Patient not taking: Reported on 10/5/2020) 28 tablet 0    losartan (COZAAR) 100 MG tablet Take 100 mg by mouth daily      blood glucose test strips (ONETOUCH VERIO) strip USE TWICE DAILY (Patient not taking: Reported on 10/5/2020) 100 strip 5    Lancets MISC Use BID DX E11.9 dispense lancets covered by patients insurance (Patient not taking: Reported on 10/5/2020) 100 each 3     No current facility-administered medications for this visit. Allergies:  Contrast [iodides]; Bactrim [sulfamethoxazole-trimethoprim]; Macrobid [nitrofurantoin]; Sulfa antibiotics; and Morphine    Gynecologic History:  No LMP recorded. Patient has had a hysterectomy.   Sexually Active: Yes  STD History: No      OB History    Para Term  AB Living   2 1 0 0 0 1   SAB TAB Ectopic Molar Multiple Live Births   0 0 0 0 0 1 Review of Systems   Constitutional: Negative for chills and fever. HENT: Negative for hearing loss. Respiratory: Negative for cough and wheezing. Cardiovascular: Negative for chest pain and palpitations. Gastrointestinal: Negative for abdominal pain, constipation, diarrhea, nausea and vomiting. Genitourinary: Negative for dysuria, frequency and urgency. Musculoskeletal: Negative for myalgias. Skin: Negative for rash. Neurological: Negative for dizziness, weakness and headaches. Hematological: Does not bruise/bleed easily. Psychiatric/Behavioral: Negative for suicidal ideas. /84 (Position: Sitting, Cuff Size: Large Adult)   Ht 5' 3\" (1.6 m)   Wt 212 lb (96.2 kg)   BMI 37.55 kg/m²     Physical Exam  Constitutional:       Appearance: She is well-developed. HENT:      Head: Normocephalic. Neck:      Thyroid: No thyromegaly. Vascular: No JVD. Cardiovascular:      Rate and Rhythm: Normal rate and regular rhythm. Pulmonary:      Effort: Pulmonary effort is normal. No respiratory distress. Breath sounds: Normal breath sounds. No wheezing or rales. Chest:      Chest wall: No tenderness. Breasts: Breasts are symmetrical.         Right: No inverted nipple, mass, nipple discharge, skin change or tenderness. Left: No inverted nipple, mass, nipple discharge, skin change or tenderness. Abdominal:      General: Bowel sounds are normal. There is no distension. Palpations: Abdomen is soft. Tenderness: There is no abdominal tenderness. Genitourinary:     Exam position: Supine. Labia:         Right: No rash, tenderness, lesion or injury. Left: No rash, tenderness, lesion or injury. Vagina: Normal. No foreign body. No erythema or bleeding. Comments: Vaginal cuff intact with healthy appearing mucosa, atrophy noted at introitus, no loss of pigmentation  Musculoskeletal: Normal range of motion.    Lymphadenopathy:      Cervical: No cervical adenopathy. Skin:     General: Skin is warm and dry. Neurological:      Mental Status: She is alert and oriented to person, place, and time. Deep Tendon Reflexes: Reflexes are normal and symmetric. Psychiatric:         Behavior: Behavior normal.         Thought Content: Thought content normal.         Judgment: Judgment normal.         ASSESSMENT:  Salina Schilder is a 60 y.o. female      ICD-10-CM    1. Encounter for screening mammogram for malignant neoplasm of breast  Z12.31 KARI DIGITAL SCREEN W OR WO CAD BILATERAL   2. Vaginal atrophy  N95.2    3. Chronic UTI  N39.0    4. History of hysterectomy for benign disease  Z90.710        PLAN:    Discussed treatment of vaginal atrophy with topical estrogen. Will start with estrogen cream. Will resume estratest for hot flushes. Advised pt get vaginal dilators for pain with intercourse. Also discussed pelvic floor PT as an option. Mammogram ordered.      Electronically signed by Jo Ann Scott MD on 10/5/2020 at 4:21 PM

## 2020-10-09 ENCOUNTER — TELEPHONE (OUTPATIENT)
Dept: OBGYN CLINIC | Age: 65
End: 2020-10-09

## 2020-10-12 RX ORDER — ESTERIFIED ESTROGEN AND METHYLTESTOSTERONE .625; 1.25 MG/1; MG/1
1 TABLET ORAL DAILY
Qty: 21 TABLET | Refills: 3 | Status: SHIPPED | OUTPATIENT
Start: 2020-10-12 | End: 2020-11-27 | Stop reason: SDUPTHER

## 2020-11-03 RX ORDER — ESCITALOPRAM OXALATE 10 MG/1
TABLET ORAL
Qty: 30 TABLET | Refills: 0 | Status: SHIPPED | OUTPATIENT
Start: 2020-11-03 | End: 2020-12-01

## 2020-11-03 NOTE — TELEPHONE ENCOUNTER
Tiki Quintero is calling to request a refill on the following medication(s):    Medication Request:  Requested Prescriptions     Pending Prescriptions Disp Refills    escitalopram (LEXAPRO) 10 MG tablet 90 tablet 0       Last Visit Date (If Applicable):  3/96/6235    Next Visit Date:    11/19/2020

## 2020-11-10 ENCOUNTER — PATIENT MESSAGE (OUTPATIENT)
Dept: INTERNAL MEDICINE CLINIC | Age: 65
End: 2020-11-10

## 2020-11-10 NOTE — TELEPHONE ENCOUNTER
From: Estuardo Russ  To: Stacy Mir MD  Sent: 11/10/2020 2:30 PM EST  Subject: Non-Urgent Medical Question    Doctor CONRAD,  [facility] you are well. I just wanted to inform you of a few things prior to my visit next week. One of my Residents tested positive for Covid. We all practice social distancing and masks are always on. This resident is always all around people . Do you think I need to be tested? Secondly, I have been monitoring my Sugar as you told me to do when I lost my insurance in July. I had not taken any of the samples you gave me until last night. I had a reading last night of 191. But Yesterday at the same time, it was 51. What a roller coaster. .. I have insurance now so I am getting back on the [id number] as you prescribed. I have been feeling kind of strange lately. Not sure if its the crazy ride of this sugar or not.

## 2020-11-11 NOTE — TELEPHONE ENCOUNTER
Starlyn Severs, MD  [facility] Clinical Staff 15 hours ago (4:50 PM)      I am not sure what the protocol at their employment and if she is exposed to Covid positive patient I think she should get checked   Regarding diabetes patient can stay on the medication Janumet 50/thousand once a day or if her sugars are dropping too low can cut down to half a tablet daily and then see me in the office as scheduled if she tested negative

## 2020-11-13 ENCOUNTER — PATIENT MESSAGE (OUTPATIENT)
Dept: INTERNAL MEDICINE CLINIC | Age: 65
End: 2020-11-13

## 2020-11-24 ENCOUNTER — PATIENT MESSAGE (OUTPATIENT)
Dept: OBGYN CLINIC | Age: 65
End: 2020-11-24

## 2020-11-25 NOTE — TELEPHONE ENCOUNTER
From: Peter Mcgowan  To: Theron Danielle MD  Sent: 11/24/2020 10:24 PM EST  Subject: Prescription Question    The estratest is working. The script you initially called into [facility] was for 21 pills. I need a refill but can you make it for a month. Thank you.

## 2020-11-27 RX ORDER — ESTERIFIED ESTROGEN AND METHYLTESTOSTERONE .625; 1.25 MG/1; MG/1
1 TABLET ORAL DAILY
Qty: 30 TABLET | Refills: 5 | Status: SHIPPED | OUTPATIENT
Start: 2020-11-27 | End: 2021-06-01 | Stop reason: SDUPTHER

## 2020-12-01 RX ORDER — ESCITALOPRAM OXALATE 10 MG/1
TABLET ORAL
Qty: 30 TABLET | Refills: 0 | Status: SHIPPED | OUTPATIENT
Start: 2020-12-01 | End: 2020-12-28

## 2020-12-01 RX ORDER — BLOOD SUGAR DIAGNOSTIC
STRIP MISCELLANEOUS
Qty: 100 EACH | Refills: 0 | Status: SHIPPED | OUTPATIENT
Start: 2020-12-01 | End: 2021-08-02

## 2020-12-07 ENCOUNTER — HOSPITAL ENCOUNTER (OUTPATIENT)
Facility: CLINIC | Age: 65
Discharge: HOME | End: 2020-12-07
Payer: COMMERCIAL

## 2020-12-07 LAB
ALBUMIN SERPL-MCNC: 4.3 G/DL (ref 3.5–5.2)
ALBUMIN/GLOBULIN RATIO: 1.5 (ref 1–2.5)
ALP BLD-CCNC: 56 U/L (ref 35–104)
ALT SERPL-CCNC: 40 U/L (ref 5–33)
AST SERPL-CCNC: 27 U/L
BILIRUB SERPL-MCNC: 0.59 MG/DL (ref 0.3–1.2)
BILIRUBIN DIRECT: 0.16 MG/DL
BILIRUBIN, INDIRECT: 0.43 MG/DL (ref 0–1)
GLOBULIN: ABNORMAL G/DL (ref 1.5–3.8)
TOTAL PROTEIN: 7.2 G/DL (ref 6.4–8.3)

## 2020-12-07 PROCEDURE — 36415 COLL VENOUS BLD VENIPUNCTURE: CPT

## 2020-12-07 PROCEDURE — 80076 HEPATIC FUNCTION PANEL: CPT

## 2020-12-09 ENCOUNTER — OFFICE VISIT (OUTPATIENT)
Dept: INTERNAL MEDICINE CLINIC | Age: 65
End: 2020-12-09
Payer: COMMERCIAL

## 2020-12-09 VITALS
BODY MASS INDEX: 37.74 KG/M2 | DIASTOLIC BLOOD PRESSURE: 82 MMHG | HEIGHT: 63 IN | TEMPERATURE: 97.2 F | WEIGHT: 213 LBS | RESPIRATION RATE: 18 BRPM | SYSTOLIC BLOOD PRESSURE: 134 MMHG | OXYGEN SATURATION: 99 % | HEART RATE: 69 BPM

## 2020-12-09 PROCEDURE — 99214 OFFICE O/P EST MOD 30 MIN: CPT | Performed by: INTERNAL MEDICINE

## 2020-12-09 SDOH — ECONOMIC STABILITY: INCOME INSECURITY: HOW HARD IS IT FOR YOU TO PAY FOR THE VERY BASICS LIKE FOOD, HOUSING, MEDICAL CARE, AND HEATING?: NOT HARD AT ALL

## 2020-12-09 SDOH — ECONOMIC STABILITY: TRANSPORTATION INSECURITY
IN THE PAST 12 MONTHS, HAS THE LACK OF TRANSPORTATION KEPT YOU FROM MEDICAL APPOINTMENTS OR FROM GETTING MEDICATIONS?: NO

## 2020-12-09 SDOH — ECONOMIC STABILITY: TRANSPORTATION INSECURITY
IN THE PAST 12 MONTHS, HAS LACK OF TRANSPORTATION KEPT YOU FROM MEETINGS, WORK, OR FROM GETTING THINGS NEEDED FOR DAILY LIVING?: NO

## 2020-12-09 SDOH — ECONOMIC STABILITY: FOOD INSECURITY: WITHIN THE PAST 12 MONTHS, THE FOOD YOU BOUGHT JUST DIDN'T LAST AND YOU DIDN'T HAVE MONEY TO GET MORE.: NEVER TRUE

## 2020-12-09 SDOH — ECONOMIC STABILITY: FOOD INSECURITY: WITHIN THE PAST 12 MONTHS, YOU WORRIED THAT YOUR FOOD WOULD RUN OUT BEFORE YOU GOT MONEY TO BUY MORE.: NEVER TRUE

## 2020-12-09 NOTE — PROGRESS NOTES
Estuardo Russ is a 60 y.o. female who presents for   Chief Complaint   Patient presents with    6 Month Follow-Up     has upcoming appt with cardiology and urology; discuss diabetes    Discuss Labs     in [address] [place] [address] Maintenance     hiv, tdap, shingles, flu    Flank Pain     having ultra sound done tomorrow    and follow up of chronic medical problems. Patient Active Problem List   Diagnosis    Tachycardia    HTN (hypertension)    Fibromyalgia    Chronic UTI    IBS (irritable bowel syndrome)    Obesity    Hypercholesterolemia    Chondromalacia patellae of right knee    Patellar contusion    Prediabetes    History of hysterectomy for benign disease    Vaginal atrophy    Major depressive disorder, single episode with anxious distress     HPI  Here for follow-up on blood pressure and diabetes denies any new complaints other than upper back pain going on for a long time and following with cardiology and urology    Current Outpatient Medications   Medication Sig Dispense Refill    escitalopram (LEXAPRO) 10 MG tablet TAKE ONE TABLET BY MOUTH ONE TIME DAILY  30 tablet 0    blood glucose test strips (ONETOUCH VERIO) strip use twice daily 100 each 0    estrogens, conjugated,-methylTESTOSTERone (ESTRATEST HS) 0.625-1.25 MG per tablet Take 1 tablet by mouth daily.  30 tablet 5    atenolol (TENORMIN) 50 MG tablet TAKE ONE TABLET BY MOUTH TWICE DAILY  180 tablet 5    traZODone (DESYREL) 50 MG tablet TAKE ONE TABLET BY MOUTH AT BEDTIME  30 tablet 5    omeprazole (PRILOSEC) 40 MG delayed release capsule Take 1 capsule by mouth daily      losartan (COZAAR) 100 MG tablet Take 100 mg by mouth daily      ibuprofen (IBU) 800 MG tablet Take 1 tablet by mouth every 6 hours as needed for Pain 60 tablet 0    cyclobenzaprine (FLEXERIL) 10 MG tablet take 1 tablet every 8 hours as needed for muscle spasms 90 tablet 0  Lancets MISC Use BID DX E11.9 dispense lancets covered by patients insurance 100 each 3     No current facility-administered medications for this visit. Allergies   Allergen Reactions    Contrast [Iodides] Hives     Pt had hives during an IVP    Bactrim [Sulfamethoxazole-Trimethoprim] Hives    Macrobid [Nitrofurantoin] Other (See Comments)     Numb lips    Sulfa Antibiotics     Morphine Nausea And Vomiting       Past Medical History:   Diagnosis Date    Anxiety     Chronic UTI     Fibromyalgia     Hematuria due to chronic cystitis     HTN (hypertension)     IBS (irritable bowel syndrome)     Major depressive disorder, single episode with anxious distress 10/8/2019    Obesity     Prediabetes     Tachycardia     Thyroid disease     nodules, hurshimoto disease    Unspecified sleep apnea     does not use machine       Past Surgical History:   Procedure Laterality Date    ABDOMEN SURGERY      abdominoplasty 2002    BLEPHAROPLASTY      BREAST REDUCTION SURGERY      CHOLECYSTECTOMY      COLONOSCOPY  2013    10 yrs    CYST REMOVAL Bilateral     feet    FOOT TENDON SURGERY Left 12/2/15    HYSTERECTOMY, TOTAL ABDOMINAL      with BSO    TONSILLECTOMY AND ADENOIDECTOMY         Family History   Problem Relation Age of Onset    Heart Disease Mother     Parkinsonism Mother     Hypertension Mother     Cancer Sister         cervical/cervical 1970s    Hypertension Sister     High Blood Pressure Other     Hypertension Brother     Elevated Lipids Brother     Other Brother         stents     ROS  ? Constitutional:  Negative for fatigue, loss of appetite and unexpected weight change  ? HEENT            : Negative for neck stiffness and pain, no congestion or sinus pressure  ? Eyes                : No visual disturbance or pain  ? Cardiovascular: No chest pain or palpitations or leg swelling  ?  Respiratory      : Negative for cough, shortness of breath or wheezing ? Gastrointestinal: Negative for abdominal pain, constipation or diarrhea and bloating No nausea or vomiting  ? Genitourinary:     No urgency or frequency, no burning or hematuria  ? Musculoskeletal: Positive for arthralgias, back pain or myalgias  ? Skin                  : Negative for rash or erythema  ? Neurological    : Negative for dizziness, weakness, tremors ,light headedness or syncope  ? Psychiatric       : Negative for dysphoric mood, sleep disturbances, nervous or anxious, or decreased concentration  ?  All other review of systems was negative    Objective  Physical Examination:    Nursing note reviewed    /82 (Site: Right Upper Arm, Position: Sitting, Cuff Size: Medium Adult)   Pulse 69   Temp 97.2 °F (36.2 °C) (Temporal)   Resp 18   Ht 5' 2.99\" (1.6 m)   Wt 213 lb (96.6 kg)   SpO2 99%   BMI 37.74 kg/m²   BP Readings from Last 3 Encounters:   12/09/20 134/82   10/05/20 124/84   06/16/20 136/74         Constitutional:  Lenore Julien is oriented to place, person and time ,appears well-developed and well-nourished  HEENT:  Atraumatic and normocephalic, external ears normal bilaterally, nose normal no oropharyngeal exudate and is clear and moist  Eyes:  EOCM normal; conjunctivae normal; PERRLA bilaterally  Neck:  Normal range of motion, neck supple, no JVD and no thyromegaly  Cardiovascular:  RRR, normal heart sounds and intact distal pulses  Pulmonary:  effort normal and breath sounds normal bilaterally,no wheezes or rales, no respiratory distress  Abdominal:  Soft, non-tender; normal bowel sounds, no masses  Musculoskeletal:  Normal range of motion and no edema or tenderness bilaterally  No lymphadenopathy  Neurological:  alert, oriented, and normal reflexes bilaterally  Skin: warm and dry  Psychiatric:  normal mood and effect; behavior normal.    Labs:   Lab Results   Component Value Date    LABA1C 5.6 07/06/2020     Lab Results   Component Value Date    CHOL 170 09/04/2019     Lab Results Component Value Date    HDL 46 09/04/2019     No results found for: [address]  Lab Results   Component Value Date    TRIG 197 (H) 09/04/2019     No components found for: [place], [place]  Lab Results   Component Value Date    WBC 7.3 07/25/2018    HGB 13.3 07/25/2018    HCT 43.6 07/25/2018    MCV 89.3 07/25/2018     07/25/2018     No results found for: INR, PROTIME  Lab Results   Component Value Date    GLUCOSE 94 07/06/2020    CREATININE 1.03 (H) 07/06/2020    BUN 20 07/06/2020     (H) 07/06/2020    K 4.3 07/06/2020     (H) 07/06/2020    CO2 21 07/06/2020     Lab Results   Component Value Date    ALT 40 (H) 12/07/2020    AST 27 12/07/2020    ALKPHOS 56 12/07/2020    BILITOT 0.59 12/07/2020     Lab Results   Component Value Date    LABPROT 7.5 11/28/2012    LABALBU 4.3 12/07/2020     Lab Results   Component Value Date    TSH 0.90 07/06/2020     Assessment:  1. Type 2 diabetes mellitus without complication, without long-term current use of insulin ([address]    2. Essential hypertension    3. High cholesterol    4. Thyroid nodule    5.  Kidney cyst, acquired        Plan:  Patient's last hemoglobin A1c 5.6 and patient started back on Janumet 50/500 once a day after she got her insurance and patient states her blood sugars are running good and will do a hemoglobin A1c  Blood pressure stable on current medications and continue same  Patient's visit to the OB/GYN reviewed and patient is started on estrogen and doing well  Patient had a history of thyroid nodules and patient had biopsy done which was benign and patient advised to get a TSH and free T4 and last TSH was within normal limits in July  Patient has some back pain mostly in the right scapular area which probably coming from her neck and advised her to do stretching exercises and the neck exercises and call me back Patient is seeing the urologist on Monday and getting an ultrasound of her kidneys tomorrow for follow-up on the right kidney cyst which is slightly increased in size in June and had an MRI done at the same time  Labs ordered to check for cholesterol  Review in 4 months           1. Kodak received counseling on the following healthy behaviors: nutrition and exercise    2. Prior labs and health maintenance reviewed. 3.  Discussed use, benefit, and side effects of prescribed medications. Barriers to medication compliance addressed. All her questions were answered. Pt voiced understanding. Yanet Pickering will continue current medications, diet and exercise. No orders of the defined types were placed in this encounter. Completed Refills               Requested Prescriptions      No prescriptions requested or ordered in this encounter     4. Patient given educational materials - see patient instructions    5. Was a self-tracking handout given in paper form or via [employer]? NO    Orders Placed This Encounter   Procedures    Comprehensive Metabolic Panel     Standing Status:   Future     Standing Expiration Date:   12/9/2021    Hemoglobin A1C     Standing Status:   Future     Standing Expiration Date:   12/9/2021    Microalbumin / Creatinine Urine Ratio     Standing Status:   Future     Standing Expiration Date:   12/9/2021    Lipid Panel     Standing Status:   Future     Standing Expiration Date:   12/9/2021     Order Specific Question:   Is Patient Fasting?/# of Hours     Answer:   12    TSH without Reflex     Standing Status:   Future     Standing Expiration Date:   12/9/2021    T4, Free     Standing Status:   Future     Standing Expiration Date:   12/9/2021    CBC     Standing Status:   Future     Standing Expiration Date:   12/9/2021     Return in about 4 months (around 4/9/2021). Patient voiced understanding and agreed to treatment plan. Electronically signed by Jerson Jimenez MD on 12/9/2020 at 3:27 PM    This note is created with a voice recognition program and while intend to generate a document that accurately reflects the content of the visit, no guarantee can be provided that every mistake has been identified and corrected by editing.

## 2020-12-12 ENCOUNTER — HOSPITAL ENCOUNTER (OUTPATIENT)
Dept: MAMMOGRAPHY | Age: 65
Discharge: HOME | End: 2020-12-14
Payer: COMMERCIAL

## 2020-12-12 PROCEDURE — 77063 BREAST TOMOSYNTHESIS BI: CPT

## 2020-12-14 ENCOUNTER — TELEPHONE (OUTPATIENT)
Dept: INTERNAL MEDICINE CLINIC | Age: 65
End: 2020-12-14

## 2020-12-16 ENCOUNTER — PATIENT MESSAGE (OUTPATIENT)
Dept: INTERNAL MEDICINE CLINIC | Age: 65
End: 2020-12-16

## 2020-12-17 ENCOUNTER — HOSPITAL ENCOUNTER (OUTPATIENT)
Dept: ULTRASOUND IMAGING | Facility: CLINIC | Age: 65
Discharge: HOME | End: 2020-12-19
Payer: COMMERCIAL

## 2020-12-17 PROCEDURE — 76536 US EXAM OF HEAD AND NECK: CPT

## 2020-12-28 ENCOUNTER — TELEPHONE (OUTPATIENT)
Dept: INTERNAL MEDICINE CLINIC | Age: 65
End: 2020-12-28

## 2020-12-28 ENCOUNTER — HOSPITAL ENCOUNTER (OUTPATIENT)
Age: 65
Setting detail: SPECIMEN
Discharge: HOME | End: 2020-12-28
Payer: COMMERCIAL

## 2020-12-28 LAB
ALBUMIN SERPL-MCNC: 4.3 G/DL (ref 3.5–5.2)
ALBUMIN/GLOBULIN RATIO: 1.6 (ref 1–2.5)
ALP BLD-CCNC: 58 U/L (ref 35–104)
ALT SERPL-CCNC: 36 U/L (ref 5–33)
ANION GAP SERPL CALCULATED.3IONS-SCNC: 11 MMOL/L (ref 9–17)
AST SERPL-CCNC: 25 U/L
BILIRUB SERPL-MCNC: 0.62 MG/DL (ref 0.3–1.2)
BUN BLDV-MCNC: 17 MG/DL (ref 8–23)
BUN/CREAT BLD: ABNORMAL (ref 9–20)
CALCIUM SERPL-MCNC: 9.5 MG/DL (ref 8.6–10.4)
CHLORIDE BLD-SCNC: 102 MMOL/L (ref 98–107)
CHOLESTEROL/HDL RATIO: 4.3
CHOLESTEROL: 185 MG/DL
CO2: 25 MMOL/L (ref 20–31)
CREAT SERPL-MCNC: 1.02 MG/DL (ref 0.5–0.9)
CREATININE URINE: 173.8 MG/DL (ref 28–217)
ESTIMATED AVERAGE GLUCOSE: 120 MG/DL
GFR AFRICAN AMERICAN: >60 ML/MIN
GFR NON-AFRICAN AMERICAN: 55 ML/MIN
GFR SERPL CREATININE-BSD FRML MDRD: ABNORMAL ML/MIN/{1.73_M2}
GFR SERPL CREATININE-BSD FRML MDRD: ABNORMAL ML/MIN/{1.73_M2}
GLUCOSE BLD-MCNC: 109 MG/DL (ref 70–99)
HBA1C MFR BLD: 5.8 % (ref 4–6)
HCT VFR BLD CALC: 46.3 % (ref 36.3–47.1)
HDLC SERPL-MCNC: 43 MG/DL
HEMOGLOBIN: 14.1 G/DL (ref 11.9–15.1)
LDL CHOLESTEROL: 107 MG/DL (ref 0–130)
MCH RBC QN AUTO: 27.1 PG (ref 25.2–33.5)
MCHC RBC AUTO-ENTMCNC: 30.5 G/DL (ref 28.4–34.8)
MCV RBC AUTO: 89 FL (ref 82.6–102.9)
MICROALBUMIN/CREAT 24H UR: 2199 MG/L
MICROALBUMIN/CREAT UR-RTO: 1265 MCG/MG CREAT
NRBC AUTOMATED: 0 PER 100 WBC
PDW BLD-RTO: 12.8 % (ref 11.8–14.4)
PLATELET # BLD: 221 K/UL (ref 138–453)
PMV BLD AUTO: 9.7 FL (ref 8.1–13.5)
POTASSIUM SERPL-SCNC: 4.5 MMOL/L (ref 3.7–5.3)
RBC # BLD: 5.2 M/UL (ref 3.95–5.11)
SODIUM BLD-SCNC: 138 MMOL/L (ref 135–144)
THYROXINE, FREE: 1.21 NG/DL (ref 0.93–1.7)
TOTAL PROTEIN: 7 G/DL (ref 6.4–8.3)
TRIGL SERPL-MCNC: 173 MG/DL
TSH SERPL DL<=0.05 MIU/L-ACNC: 1.45 MIU/L (ref 0.3–5)
VLDLC SERPL CALC-MCNC: ABNORMAL MG/DL (ref 1–30)
WBC # BLD: 7.7 K/UL (ref 3.5–11.3)

## 2020-12-28 RX ORDER — ESCITALOPRAM OXALATE 10 MG/1
TABLET ORAL
Qty: 90 TABLET | Refills: 1 | Status: SHIPPED | OUTPATIENT
Start: 2020-12-28 | End: 2021-06-16

## 2020-12-28 NOTE — TELEPHONE ENCOUNTER
Elsa Segundo is calling to request a refill on the following medication(s):    Medication Request:  Requested Prescriptions     Pending Prescriptions Disp Refills    escitalopram (LEXAPRO) 10 MG tablet [Pharmacy Med Name: Escitalopram Oxalate Oral Tablet 10 MG] 30 tablet 0     Sig: TAKE ONE TABLET BY MOUTH ONE TIME DAILY     Last filled 12/1/20 # 30 no refill  Order pending 90 day 1 refill    Last Visit Date (If Applicable):  84/0/0739    Next Visit Date:    4/8/2021

## 2020-12-28 NOTE — TELEPHONE ENCOUNTER
Patient calling asking about her lab work    Says that she is concerned with the numbers on the urine, she does want you to know that before she took the test she did NOT wipe with the little cleaning pad first, but says she doesn't know if that matters.     Please advise

## 2020-12-29 NOTE — TELEPHONE ENCOUNTER
Previous questions answered see phone encounter from 12/28/2020    Patient is now asking since her A1C is good  she should stay on her Janumet or can she go off and try to maintain?

## 2020-12-29 NOTE — TELEPHONE ENCOUNTER
From: Leola Wood  To: Nurse Glo Jeffrey  Sent: 12/28/2020 6:20 PM EST  Subject: RE:Thyroid Ultrasound    Another question. ..since my A1c was good   Do i still stay on my one Jamumet a day? Or can i go off it. ..and try to maintain?      ----- Message -----   From:MA Misty Crowleyy   Sent:12/28/2020 3:51 PM EST   To:Kodak Arnett   Subject:RE:Thyroid Ultrasound    Kevon Casey,    Thank you for using [facility]. This message has been forwarded to your [facility]) provider. Please allow your provider some time to review the message and act on it as necessary. You should hear something within 24 business hours. If you don't, please respond to this message or call the office. Jenise Amezcua      ----- Message -----   From:Kodak Starks   Sent:12/28/2020 3:20 PM EST   To:Nurse Glo Jeffrey   Subject:RE:Thyroid Ultrasound    Theresa Ram,  I had some bloodwork done today and I got the results quickly on my chart. My Microalb/Crt. Ratio is super HIGH. Not sure what that means however, when I completed my urine test at the lab, I did not do the usual, with the wiping and then urinating. . I just went. So I am not sure if this is why it came out so high or not. SO now with these readings I am concerned. I spoke to someone in your office and she was going to talk to Doctor EVANGELINA. I hope I get an answer. Thanks      ----- Message -----   From:Nurse Glo Jeffrey   Sent:12/16/2020 12:15 PM EST   To:Kodak Starks   Subject:RE:Thyroid Ultrasound    Thanks Yokasta Messer! [place]      ----- Message -----   From:Kodak Starks   Sent:12/16/2020 11:00 AM EST   To:Nurse Glo Jeffrey   Subject:RE:Thyroid Ultrasound    Scheduled tomorrow      ----- Message -----   From:Nurse Glo Jeffrey   Sent:12/14/2020 1:43 PM EST   To:Kodak Starks   Subject:Thyroid Ultrasound    Dr Kinjal Isabel did decide to order a Thyroid Ultrasound. He did place the order.    [facility] scheduling will call you, or you can call them at [phone].   Thanks, Clay Farrar

## 2021-02-02 ENCOUNTER — PATIENT MESSAGE (OUTPATIENT)
Dept: INTERNAL MEDICINE CLINIC | Age: 66
End: 2021-02-02

## 2021-02-02 RX ORDER — PREDNISONE 10 MG/1
10 TABLET ORAL
Qty: 15 TABLET | Refills: 0 | Status: SHIPPED | OUTPATIENT
Start: 2021-02-02 | End: 2021-02-07

## 2021-02-02 RX ORDER — GABAPENTIN 100 MG/1
100 CAPSULE ORAL 3 TIMES DAILY
Qty: 30 CAPSULE | Refills: 0 | Status: SHIPPED | OUTPATIENT
Start: 2021-02-02 | End: 2021-05-21

## 2021-02-02 NOTE — TELEPHONE ENCOUNTER
From: Lucy Solders  To: Jonathan Samson MD  Sent: 2/2/2021 2:38 PM EST  Subject: Non-Urgent Tad Falling,  The Doctor that Dr. Fabien Partida referred me to is no longer seeing patients with back issues. I made an appt with Doctor Zaida Fine. I saw him in 2016. I am unable to get in until next Thurs. Can Doctor K give me something for pain? It has no travelled down to my right leg. [facility].     Thanks

## 2021-02-17 ENCOUNTER — HOSPITAL ENCOUNTER (OUTPATIENT)
Dept: MRI IMAGING | Facility: CLINIC | Age: 66
Discharge: HOME | End: 2021-02-19
Payer: COMMERCIAL

## 2021-02-17 DIAGNOSIS — M54.6 THORACIC BACK PAIN, UNSPECIFIED BACK PAIN LATERALITY, UNSPECIFIED CHRONICITY: ICD-10-CM

## 2021-02-17 PROCEDURE — 72146 MRI CHEST SPINE W/O DYE: CPT

## 2021-03-22 ENCOUNTER — PATIENT MESSAGE (OUTPATIENT)
Dept: INTERNAL MEDICINE CLINIC | Age: 66
End: 2021-03-22

## 2021-03-22 NOTE — TELEPHONE ENCOUNTER
From: Milena Gayle  To: Jl Morley MD  Sent: 3/22/2021 10:39 AM EDT  Subject: Non-Urgent Medical Question    Hello,  I have been scheduled for cortisone shots in my spine, in April. For the past week the pain has intensified. .right side, middle pain. I took a 800 ibprophen yesterday, and it took the pain away. My question is, how can i be certain this is coming from my back and not a kidney stone? I had one in the past just dont recall details other than the pain was severe. Appreciate your input.

## 2021-03-23 RX ORDER — CYCLOBENZAPRINE HCL 10 MG
10 TABLET ORAL 3 TIMES DAILY PRN
Qty: 90 TABLET | Refills: 0 | Status: SHIPPED | OUTPATIENT
Start: 2021-03-23 | End: 2021-04-22

## 2021-03-29 DIAGNOSIS — F43.21 GRIEF REACTION: ICD-10-CM

## 2021-03-29 DIAGNOSIS — R10.30 LOWER ABDOMINAL PAIN: ICD-10-CM

## 2021-03-29 DIAGNOSIS — F32.A DEPRESSION, UNSPECIFIED DEPRESSION TYPE: ICD-10-CM

## 2021-03-29 DIAGNOSIS — M79.10 MYALGIA: ICD-10-CM

## 2021-03-30 RX ORDER — TRAZODONE HYDROCHLORIDE 50 MG/1
TABLET ORAL
Qty: 30 TABLET | Refills: 1 | Status: SHIPPED | OUTPATIENT
Start: 2021-03-30 | End: 2021-05-21

## 2021-05-02 ENCOUNTER — PATIENT MESSAGE (OUTPATIENT)
Dept: INTERNAL MEDICINE CLINIC | Age: 66
End: 2021-05-02

## 2021-05-03 NOTE — TELEPHONE ENCOUNTER
From: Madeleine Stern  To: Jose Merino MD  Sent: 2021 1:09 PM EDT  Subject: Prescription Question    Hello,  Is there Anyway i may pickup another coupon for jamumet? Pharmacy said mine .   Thank guero

## 2021-05-16 ENCOUNTER — PATIENT MESSAGE (OUTPATIENT)
Dept: INTERNAL MEDICINE CLINIC | Age: 66
End: 2021-05-16

## 2021-05-16 DIAGNOSIS — N39.0 URINARY TRACT INFECTION WITHOUT HEMATURIA, SITE UNSPECIFIED: Primary | ICD-10-CM

## 2021-05-17 ENCOUNTER — HOSPITAL ENCOUNTER (OUTPATIENT)
Age: 66
Setting detail: SPECIMEN
Discharge: HOME | End: 2021-05-17
Payer: COMMERCIAL

## 2021-05-17 DIAGNOSIS — N39.0 URINARY TRACT INFECTION WITHOUT HEMATURIA, SITE UNSPECIFIED: ICD-10-CM

## 2021-05-17 LAB
-: NORMAL
AMORPHOUS: NORMAL
BACTERIA: NORMAL
BILIRUBIN URINE: NEGATIVE
CASTS UA: NORMAL /LPF (ref 0–8)
COLOR: YELLOW
COMMENT UA: ABNORMAL
CRYSTALS, UA: NORMAL /HPF
EPITHELIAL CELLS UA: NORMAL /HPF (ref 0–5)
GLUCOSE URINE: NEGATIVE
KETONES, URINE: NEGATIVE
LEUKOCYTE ESTERASE, URINE: NEGATIVE
MUCUS: NORMAL
NITRITE, URINE: NEGATIVE
OTHER OBSERVATIONS UA: NORMAL
PH UA: 5 (ref 5–8)
PROTEIN UA: ABNORMAL
RBC UA: NORMAL /HPF (ref 0–4)
RENAL EPITHELIAL, UA: NORMAL /HPF
SPECIFIC GRAVITY UA: 1.01 (ref 1–1.03)
TRICHOMONAS: NORMAL
TURBIDITY: CLEAR
URINE HGB: NEGATIVE
UROBILINOGEN, URINE: NORMAL
WBC UA: NORMAL /HPF (ref 0–5)
YEAST: NORMAL

## 2021-05-17 NOTE — TELEPHONE ENCOUNTER
From: Ady Mesa  To: Subha Diaz MD  Sent: 5/16/2021 12:15 PM EDT  Subject: Non-Urgent Medical Question    Dr. Laith Jarrett,  I believe i have a uti. I started the antibiotics i have here at home until i can get a speciman done. Hit like ton of bricks today. Can you call a script to the lab in your bldg. I will go on lunch tomorrow to get it done.

## 2021-05-18 LAB
CULTURE: NORMAL
Lab: NORMAL
SPECIMEN DESCRIPTION: NORMAL

## 2021-05-21 ENCOUNTER — OFFICE VISIT (OUTPATIENT)
Dept: INTERNAL MEDICINE CLINIC | Age: 66
End: 2021-05-21
Payer: COMMERCIAL

## 2021-05-21 VITALS
WEIGHT: 204 LBS | HEIGHT: 63 IN | SYSTOLIC BLOOD PRESSURE: 110 MMHG | DIASTOLIC BLOOD PRESSURE: 72 MMHG | HEART RATE: 87 BPM | OXYGEN SATURATION: 99 % | TEMPERATURE: 97.5 F | RESPIRATION RATE: 22 BRPM | BODY MASS INDEX: 36.14 KG/M2

## 2021-05-21 DIAGNOSIS — E78.00 HIGH CHOLESTEROL: ICD-10-CM

## 2021-05-21 DIAGNOSIS — E11.9 TYPE 2 DIABETES MELLITUS WITHOUT COMPLICATION, WITHOUT LONG-TERM CURRENT USE OF INSULIN (HCC): Primary | ICD-10-CM

## 2021-05-21 DIAGNOSIS — I10 ESSENTIAL HYPERTENSION: ICD-10-CM

## 2021-05-21 PROCEDURE — 99214 OFFICE O/P EST MOD 30 MIN: CPT | Performed by: INTERNAL MEDICINE

## 2021-05-21 RX ORDER — SITAGLIPTIN AND METFORMIN HYDROCHLORIDE 500; 50 MG/1; MG/1
TABLET, FILM COATED ORAL
Qty: 60 TABLET | Refills: 5 | COMMUNITY
Start: 2021-05-21 | End: 2021-09-30

## 2021-05-21 ASSESSMENT — PATIENT HEALTH QUESTIONNAIRE - PHQ9
SUM OF ALL RESPONSES TO PHQ QUESTIONS 1-9: 0
SUM OF ALL RESPONSES TO PHQ9 QUESTIONS 1 & 2: 0
SUM OF ALL RESPONSES TO PHQ QUESTIONS 1-9: 0
1. LITTLE INTEREST OR PLEASURE IN DOING THINGS: 0

## 2021-05-21 NOTE — PROGRESS NOTES
Reji Stuart is a 60 y.o. female who presents for   Chief Complaint   Patient presents with    Check-Up     discuss a1c and diabetic medication    Health Maintenance     pneumo, hiv, tdap, shingles    and follow up of chronic medical problems. Patient Active Problem List   Diagnosis    Tachycardia    HTN (hypertension)    Fibromyalgia    Chronic UTI    IBS (irritable bowel syndrome)    Obesity    Hypercholesterolemia    Chondromalacia patellae of right knee    Patellar contusion    Prediabetes    History of hysterectomy for benign disease    Vaginal atrophy    Major depressive disorder, single episode with anxious distress     HPI  Here for follow-up on blood pressure and diabetes denies any new complaints today    Current Outpatient Medications   Medication Sig Dispense Refill    escitalopram (LEXAPRO) 10 MG tablet TAKE ONE TABLET BY MOUTH ONE TIME DAILY  90 tablet 1    blood glucose test strips (ONETOUCH VERIO) strip use twice daily 100 each 0    estrogens, conjugated,-methylTESTOSTERone (ESTRATEST HS) 0.625-1.25 MG per tablet Take 1 tablet by mouth daily. 30 tablet 5    atenolol (TENORMIN) 50 MG tablet TAKE ONE TABLET BY MOUTH TWICE DAILY  180 tablet 5    omeprazole (PRILOSEC) 40 MG delayed release capsule Take 1 capsule by mouth daily      losartan (COZAAR) 100 MG tablet Take 100 mg by mouth daily      ibuprofen (IBU) 800 MG tablet Take 1 tablet by mouth every 6 hours as needed for Pain 60 tablet 0    Lancets MISC Use BID DX E11.9 dispense lancets covered by patients insurance 100 each 3     No current facility-administered medications for this visit.        Allergies   Allergen Reactions    Contrast [Iodides] Hives     Pt had hives during an IVP    Bactrim [Sulfamethoxazole-Trimethoprim] Hives    Macrobid [Nitrofurantoin] Other (See Comments)     Numb lips    Sulfa Antibiotics     Morphine Nausea And Vomiting       Past Medical History:   Diagnosis Date    Anxiety     Chronic UTI     Fibromyalgia     Hematuria due to chronic cystitis     HTN (hypertension)     IBS (irritable bowel syndrome)     Major depressive disorder, single episode with anxious distress 10/8/2019    Obesity     Prediabetes     Tachycardia     Thyroid disease     nodules, hurshimoto disease    Unspecified sleep apnea     does not use machine       Past Surgical History:   Procedure Laterality Date    ABDOMEN SURGERY      abdominoplasty 2002    BLEPHAROPLASTY      BREAST REDUCTION SURGERY      CHOLECYSTECTOMY      COLONOSCOPY  2013    10 yrs    CYST REMOVAL Bilateral     feet    FOOT TENDON SURGERY Left 12/2/15    HYSTERECTOMY, TOTAL ABDOMINAL      with BSO    TONSILLECTOMY AND ADENOIDECTOMY         Family History   Problem Relation Age of Onset    Heart Disease Mother     Parkinsonism Mother     Hypertension Mother     Cancer Sister         cervical/cervical 1970s    Hypertension Sister     High Blood Pressure Other     Hypertension Brother     Elevated Lipids Brother     Other Brother         stents     ROS   Constitutional:  Negative for fatigue, loss of appetite and unexpected weight change   HEENT            : Negative for neck stiffness and pain, no congestion or sinus pressure   Eyes                : No visual disturbance or pain   Cardiovascular: No chest pain or palpitations or leg swelling   Respiratory      : Negative for cough, shortness of breath or wheezing   Gastrointestinal: Negative for abdominal pain, constipation or diarrhea and bloating No nausea or vomiting   Genitourinary:     No urgency or frequency, no burning or hematuria   Musculoskeletal: No arthralgias, back pain or myalgias   Skin                  : Negative for rash or erythema   Neurological    : Negative for dizziness, weakness, tremors ,light headedness or syncope   Psychiatric       : Negative for dysphoric mood, sleep disturbances, nervous or anxious, or decreased concentration   All 4.5 12/28/2020     12/28/2020    CO2 25 12/28/2020     Lab Results   Component Value Date    ALT 36 (H) 12/28/2020    AST 25 12/28/2020    ALKPHOS 58 12/28/2020    BILITOT 0.62 12/28/2020     Lab Results   Component Value Date    LABPROT 7.5 11/28/2012    LABALBU 4.3 12/28/2020     Lab Results   Component Value Date    TSH 1.45 12/28/2020     Assessment:  1. Type 2 diabetes mellitus without complication, without long-term current use of insulin ([facility] [address]    2. Essential hypertension    3. High cholesterol        Plan:  Patient's blood pressure is stable and continue current treatment and is on losartan 100 mg daily  Patient's urine for microalbumin is elevated and patient is on renal protection medication losartan and patient's hemoglobin A1c was 5.8 last time and will repeat lab work and patient had cortisone injections in her back and was worried about her sugars may be elevated but her last blood sugar according to her was 113 after eating  Patient's last cholesterol was within reasonable limits and repeat lab work and last cholesterol 185 and HDL 43 and triglycerides 173  She had a diabetic foot exam today  Advised patient to get an eye examination and doctor's number given to the patient whom she has seen in the past patient's urinalysis and culture and sensitivities were negative and explained to the patient  Review in 4 months             1.  [employer] received counseling on the following healthy behaviors: nutrition and exercise    2. Prior labs and health maintenance reviewed. 3.  Discussed use, benefit, and side effects of prescribed medications. Barriers to medication compliance addressed. All her questions were answered. Pt voiced understanding. Karie Ordonez will continue current medications, diet and exercise. No orders of the defined types were placed in this encounter.          Completed Refills               Requested Prescriptions      No prescriptions requested or ordered in this

## 2021-06-01 ENCOUNTER — HOSPITAL ENCOUNTER (OUTPATIENT)
Facility: CLINIC | Age: 66
Discharge: HOME | End: 2021-06-01
Payer: COMMERCIAL

## 2021-06-01 DIAGNOSIS — E78.00 HIGH CHOLESTEROL: ICD-10-CM

## 2021-06-01 DIAGNOSIS — I10 ESSENTIAL HYPERTENSION: ICD-10-CM

## 2021-06-01 DIAGNOSIS — E11.9 TYPE 2 DIABETES MELLITUS WITHOUT COMPLICATION, WITHOUT LONG-TERM CURRENT USE OF INSULIN (HCC): ICD-10-CM

## 2021-06-01 DIAGNOSIS — N95.1 MENOPAUSAL HOT FLUSHES: ICD-10-CM

## 2021-06-01 LAB
ALBUMIN SERPL-MCNC: 4.2 G/DL (ref 3.5–5.2)
ALBUMIN/GLOBULIN RATIO: 1.6 (ref 1–2.5)
ALP BLD-CCNC: 50 U/L (ref 35–104)
ALT SERPL-CCNC: 12 U/L (ref 5–33)
ANION GAP SERPL CALCULATED.3IONS-SCNC: 9 MMOL/L (ref 9–17)
AST SERPL-CCNC: 14 U/L
BILIRUB SERPL-MCNC: 0.62 MG/DL (ref 0.3–1.2)
BUN BLDV-MCNC: 17 MG/DL (ref 8–23)
BUN/CREAT BLD: ABNORMAL (ref 9–20)
CALCIUM SERPL-MCNC: 9.1 MG/DL (ref 8.6–10.4)
CHLORIDE BLD-SCNC: 107 MMOL/L (ref 98–107)
CHOLESTEROL/HDL RATIO: 4.3
CHOLESTEROL: 196 MG/DL
CO2: 27 MMOL/L (ref 20–31)
CREAT SERPL-MCNC: 0.97 MG/DL (ref 0.5–0.9)
CREATININE URINE: 153.6 MG/DL (ref 28–217)
GFR AFRICAN AMERICAN: >60 ML/MIN
GFR NON-AFRICAN AMERICAN: 58 ML/MIN
GFR SERPL CREATININE-BSD FRML MDRD: ABNORMAL ML/MIN/{1.73_M2}
GFR SERPL CREATININE-BSD FRML MDRD: ABNORMAL ML/MIN/{1.73_M2}
GLUCOSE BLD-MCNC: 79 MG/DL (ref 70–99)
HCT VFR BLD CALC: 41.9 % (ref 36.3–47.1)
HDLC SERPL-MCNC: 46 MG/DL
HEMOGLOBIN: 12.7 G/DL (ref 11.9–15.1)
LDL CHOLESTEROL: 131 MG/DL (ref 0–130)
MCH RBC QN AUTO: 27.3 PG (ref 25.2–33.5)
MCHC RBC AUTO-ENTMCNC: 30.3 G/DL (ref 28.4–34.8)
MCV RBC AUTO: 90.1 FL (ref 82.6–102.9)
MICROALBUMIN/CREAT 24H UR: 507 MG/L
MICROALBUMIN/CREAT UR-RTO: 330 MCG/MG CREAT
NRBC AUTOMATED: 0 PER 100 WBC
PDW BLD-RTO: 13.2 % (ref 11.8–14.4)
PLATELET # BLD: 221 K/UL (ref 138–453)
PMV BLD AUTO: 10.6 FL (ref 8.1–13.5)
POTASSIUM SERPL-SCNC: 4.4 MMOL/L (ref 3.7–5.3)
RBC # BLD: 4.65 M/UL (ref 3.95–5.11)
SODIUM BLD-SCNC: 143 MMOL/L (ref 135–144)
TOTAL CK: 38 U/L (ref 26–192)
TOTAL PROTEIN: 6.8 G/DL (ref 6.4–8.3)
TRIGL SERPL-MCNC: 94 MG/DL
TSH SERPL DL<=0.05 MIU/L-ACNC: 0.82 MIU/L (ref 0.3–5)
VLDLC SERPL CALC-MCNC: ABNORMAL MG/DL (ref 1–30)
WBC # BLD: 8.2 K/UL (ref 3.5–11.3)

## 2021-06-01 PROCEDURE — 80061 LIPID PANEL: CPT

## 2021-06-01 PROCEDURE — 36415 COLL VENOUS BLD VENIPUNCTURE: CPT

## 2021-06-01 PROCEDURE — 83036 HEMOGLOBIN GLYCOSYLATED A1C: CPT

## 2021-06-01 PROCEDURE — 84443 ASSAY THYROID STIM HORMONE: CPT

## 2021-06-01 PROCEDURE — 82043 UR ALBUMIN QUANTITATIVE: CPT

## 2021-06-01 PROCEDURE — 80053 COMPREHEN METABOLIC PANEL: CPT

## 2021-06-01 PROCEDURE — 85027 COMPLETE CBC AUTOMATED: CPT

## 2021-06-01 PROCEDURE — 82550 ASSAY OF CK (CPK): CPT

## 2021-06-01 PROCEDURE — 82570 ASSAY OF URINE CREATININE: CPT

## 2021-06-01 RX ORDER — ESTERIFIED ESTROGEN AND METHYLTESTOSTERONE .625; 1.25 MG/1; MG/1
1 TABLET ORAL DAILY
Qty: 30 TABLET | Refills: 5 | Status: SHIPPED | OUTPATIENT
Start: 2021-06-01 | End: 2021-09-30

## 2021-06-02 ENCOUNTER — PATIENT MESSAGE (OUTPATIENT)
Dept: INTERNAL MEDICINE CLINIC | Age: 66
End: 2021-06-02

## 2021-06-02 DIAGNOSIS — R73.03 PREDIABETES: Primary | ICD-10-CM

## 2021-06-02 LAB
ESTIMATED AVERAGE GLUCOSE: 111 MG/DL
HBA1C MFR BLD: 5.5 % (ref 4–6)

## 2021-06-02 NOTE — TELEPHONE ENCOUNTER
From: Reji Stuart  To: Nancy Heller MD  Sent: 6/2/2021 9:36 AM EDT  Subject: Non-Urgent Medical Question    Reviewed my test results. .can i get off the diabetic meds?

## 2021-06-16 RX ORDER — ESCITALOPRAM OXALATE 10 MG/1
TABLET ORAL
Qty: 90 TABLET | Refills: 1 | Status: SHIPPED | OUTPATIENT
Start: 2021-06-16 | End: 2021-12-30

## 2021-06-19 RX ORDER — PREDNISONE 10 MG/1
10 TABLET ORAL
Qty: 15 TABLET | Refills: 0 | Status: SHIPPED | OUTPATIENT
Start: 2021-06-19 | End: 2021-06-24

## 2021-06-21 NOTE — PROGRESS NOTES
Patient called on Saturday complaining of great toe swelling and pain and had no good gout attack in the last 8 years and started happening now and wants to call in the medication and a prescription for prednisone called into the pharmacy

## 2021-06-22 ENCOUNTER — HOSPITAL ENCOUNTER (OUTPATIENT)
Facility: CLINIC | Age: 66
Discharge: HOME | End: 2021-06-22
Payer: COMMERCIAL

## 2021-06-22 DIAGNOSIS — M79.675 PAIN OF LEFT GREAT TOE: ICD-10-CM

## 2021-06-22 DIAGNOSIS — R73.03 PREDIABETES: ICD-10-CM

## 2021-06-22 LAB
CREATININE URINE: 40.5 MG/DL (ref 28–217)
MICROALBUMIN/CREAT 24H UR: 249 MG/L
MICROALBUMIN/CREAT UR-RTO: 615 MCG/MG CREAT

## 2021-06-22 PROCEDURE — 82570 ASSAY OF URINE CREATININE: CPT

## 2021-06-22 PROCEDURE — 36415 COLL VENOUS BLD VENIPUNCTURE: CPT

## 2021-06-22 PROCEDURE — 82043 UR ALBUMIN QUANTITATIVE: CPT

## 2021-06-22 PROCEDURE — 84550 ASSAY OF BLOOD/URIC ACID: CPT

## 2021-06-23 LAB — URIC ACID: 6.2 MG/DL (ref 2.4–5.7)

## 2021-07-29 DIAGNOSIS — E11.9 TYPE 2 DIABETES MELLITUS WITHOUT COMPLICATION, WITHOUT LONG-TERM CURRENT USE OF INSULIN (HCC): ICD-10-CM

## 2021-07-30 NOTE — TELEPHONE ENCOUNTER
Reginald Orona is calling to request a refill on the following medication(s):    Medication Request:  Requested Prescriptions     Pending Prescriptions Disp Refills    Lurdes Balloon strip [Pharmacy Med Name: Ginger Chan In Vitro Strip] 100 each 0     Sig: use twice daily     Last fill 6/16/21  Last Visit Date (If Applicable):  7/30/9154    Next Visit Date:    9/24/2021

## 2021-08-02 RX ORDER — BLOOD SUGAR DIAGNOSTIC
STRIP MISCELLANEOUS
Qty: 100 EACH | Refills: 0 | Status: SHIPPED | OUTPATIENT
Start: 2021-08-02 | End: 2022-04-08 | Stop reason: ALTCHOICE

## 2021-08-05 ENCOUNTER — PATIENT MESSAGE (OUTPATIENT)
Dept: INTERNAL MEDICINE CLINIC | Age: 66
End: 2021-08-05

## 2021-08-05 NOTE — TELEPHONE ENCOUNTER
From: Tiki Quintero  To: Melissa Peck MD  Sent: 8/5/2021 8:50 AM EDT  Subject: Non-Urgent Medical Question    Good Morning,  I have a question on hair loss. I am losing a lot and cant afford too. Is there blood work that would assess if i am lacking a vitamin or something?   Thanks

## 2021-08-13 ENCOUNTER — HOSPITAL ENCOUNTER (OUTPATIENT)
Facility: CLINIC | Age: 66
Discharge: HOME | End: 2021-08-13
Payer: COMMERCIAL

## 2021-08-13 LAB
BUN BLDV-MCNC: 18 MG/DL (ref 8–23)
CREAT SERPL-MCNC: 0.99 MG/DL (ref 0.5–0.9)
GFR AFRICAN AMERICAN: >60 ML/MIN
GFR NON-AFRICAN AMERICAN: 56 ML/MIN
GFR SERPL CREATININE-BSD FRML MDRD: ABNORMAL ML/MIN/{1.73_M2}
GFR SERPL CREATININE-BSD FRML MDRD: ABNORMAL ML/MIN/{1.73_M2}

## 2021-08-13 PROCEDURE — 82565 ASSAY OF CREATININE: CPT

## 2021-08-13 PROCEDURE — 36415 COLL VENOUS BLD VENIPUNCTURE: CPT

## 2021-08-13 PROCEDURE — 84520 ASSAY OF UREA NITROGEN: CPT

## 2021-08-23 RX ORDER — ATENOLOL 50 MG/1
TABLET ORAL
Qty: 180 TABLET | Refills: 0 | Status: SHIPPED | OUTPATIENT
Start: 2021-08-23 | End: 2022-04-22

## 2021-08-30 ENCOUNTER — HOSPITAL ENCOUNTER (OUTPATIENT)
Dept: ULTRASOUND IMAGING | Facility: CLINIC | Age: 66
Discharge: HOME | End: 2021-09-01
Payer: COMMERCIAL

## 2021-08-30 DIAGNOSIS — E04.1 THYROID NODULE: ICD-10-CM

## 2021-08-30 PROCEDURE — 76536 US EXAM OF HEAD AND NECK: CPT

## 2021-09-13 RX ORDER — LOSARTAN POTASSIUM 100 MG/1
TABLET ORAL
Qty: 90 TABLET | Refills: 0 | Status: SHIPPED | OUTPATIENT
Start: 2021-09-13 | End: 2021-12-13

## 2021-09-13 NOTE — TELEPHONE ENCOUNTER
Willie Sierra is calling to request a refill on the following medication(s):    Medication Request:    Noted as historic med 11/5/19      Requested Prescriptions     Pending Prescriptions Disp Refills    losartan (COZAAR) 100 MG tablet [Pharmacy Med Name: Losartan Potassium Oral Tablet 100 MG] 90 tablet 0     Sig: TAKE ONE TABLET BY MOUTH ONE TIME DAILY       Last Visit Date (If Applicable):  6/38/2832    Next Visit Date:    9/24/2021

## 2021-09-19 ENCOUNTER — PATIENT MESSAGE (OUTPATIENT)
Dept: INTERNAL MEDICINE CLINIC | Age: 66
End: 2021-09-19

## 2021-09-20 NOTE — TELEPHONE ENCOUNTER
From: Be Damico  To: Mckenzie Rincon MD  Sent: 9/19/2021 6:40 PM EDT  Subject: Non-Urgent Medical Question    Dr. Harrington Single  I woke up this am with the gout in my right foot big toe. I can hardly walk. My cousin who has gout gave me a few allpurinol. ..took two. .. Still excruciating pain. I have to work tomorrow. .Monday. but if u want blood work i can go to the lab on [facility].   Tks

## 2021-09-21 RX ORDER — COLCHICINE 0.6 MG/1
0.6 TABLET ORAL 2 TIMES DAILY
Qty: 6 TABLET | Refills: 3 | Status: SHIPPED | OUTPATIENT
Start: 2021-09-21 | End: 2022-04-08 | Stop reason: ALTCHOICE

## 2021-09-30 ENCOUNTER — OFFICE VISIT (OUTPATIENT)
Dept: INTERNAL MEDICINE CLINIC | Age: 66
End: 2021-09-30
Payer: COMMERCIAL

## 2021-09-30 VITALS
BODY MASS INDEX: 37.03 KG/M2 | HEART RATE: 76 BPM | RESPIRATION RATE: 16 BRPM | HEIGHT: 63 IN | DIASTOLIC BLOOD PRESSURE: 72 MMHG | TEMPERATURE: 97.7 F | WEIGHT: 209 LBS | SYSTOLIC BLOOD PRESSURE: 126 MMHG

## 2021-09-30 DIAGNOSIS — E11.9 TYPE 2 DIABETES MELLITUS WITHOUT COMPLICATION, WITHOUT LONG-TERM CURRENT USE OF INSULIN (HCC): Primary | ICD-10-CM

## 2021-09-30 DIAGNOSIS — I10 ESSENTIAL HYPERTENSION: ICD-10-CM

## 2021-09-30 DIAGNOSIS — R94.39 ABNORMAL CARDIOVASCULAR STRESS TEST: ICD-10-CM

## 2021-09-30 DIAGNOSIS — Z71.3 DIETARY COUNSELING: ICD-10-CM

## 2021-09-30 PROCEDURE — G0444 DEPRESSION SCREEN ANNUAL: HCPCS | Performed by: INTERNAL MEDICINE

## 2021-09-30 PROCEDURE — G0447 BEHAVIOR COUNSEL OBESITY 15M: HCPCS | Performed by: INTERNAL MEDICINE

## 2021-09-30 PROCEDURE — G8417 CALC BMI ABV UP PARAM F/U: HCPCS | Performed by: INTERNAL MEDICINE

## 2021-09-30 PROCEDURE — 99214 OFFICE O/P EST MOD 30 MIN: CPT | Performed by: INTERNAL MEDICINE

## 2021-09-30 NOTE — PROGRESS NOTES
Ena Winters is a 73 y.o. female who presents for   Chief Complaint   Patient presents with    Other     Dr Nikolai Woodard doing heart cath 10/7/221 at [facility]    Other     AM , PM     Hypertension     labs from June in [facility]    and follow up of chronic medical problems. Patient Active Problem List   Diagnosis    Tachycardia    HTN (hypertension)    Fibromyalgia    Chronic UTI    IBS (irritable bowel syndrome)    Obesity    Hypercholesterolemia    Chondromalacia patellae of right knee    Patellar contusion    Prediabetes    History of hysterectomy for benign disease    Vaginal atrophy    Major depressive disorder, single episode with anxious distress     HPI  Here for follow-up on blood pressure and diabetes and patient denies any new complaints and getting heart cath done in 2 weeks    Current Outpatient Medications   Medication Sig Dispense Refill    colchicine (COLCRYS) 0.6 MG tablet Take 1 tablet by mouth 2 times daily for 3 days 6 tablet 3    losartan (COZAAR) 100 MG tablet TAKE ONE TABLET BY MOUTH ONE TIME DAILY  90 tablet 0    atenolol (TENORMIN) 50 MG tablet TAKE ONE TABLET BY MOUTH TWICE DAILY  180 tablet 0    ONETOUCH VERIO strip use twice daily 100 each 0    escitalopram (LEXAPRO) 10 MG tablet TAKE ONE TABLET BY MOUTH ONE TIME DAILY  90 tablet 1    omeprazole (PRILOSEC) 40 MG delayed release capsule Take 1 capsule by mouth daily      ibuprofen (IBU) 800 MG tablet Take 1 tablet by mouth every 6 hours as needed for Pain 60 tablet 0    Lancets MISC Use BID DX E11.9 dispense lancets covered by patients insurance 100 each 3     No current facility-administered medications for this visit.        Allergies   Allergen Reactions    Contrast [Iodides] Hives     Pt had hives during an IVP    Bactrim [Sulfamethoxazole-Trimethoprim] Hives    Macrobid [Nitrofurantoin] Other (See Comments)     Numb lips    Sulfa Antibiotics     Morphine Nausea And Vomiting       Past Medical History: Diagnosis Date    Anxiety     Chronic UTI     Fibromyalgia     Hematuria due to chronic cystitis     HTN (hypertension)     IBS (irritable bowel syndrome)     Major depressive disorder, single episode with anxious distress 10/8/2019    Obesity     Prediabetes     Tachycardia     Thyroid disease     nodules, hurshimoto disease    Unspecified sleep apnea     does not use machine       Past Surgical History:   Procedure Laterality Date    ABDOMEN SURGERY      abdominoplasty 2002    BLEPHAROPLASTY      BREAST REDUCTION SURGERY      CHOLECYSTECTOMY      COLONOSCOPY  2013    10 yrs    CYST REMOVAL Bilateral     feet    FOOT TENDON SURGERY Left 12/2/15    HYSTERECTOMY, TOTAL ABDOMINAL      with BSO    TONSILLECTOMY AND ADENOIDECTOMY         Family History   Problem Relation Age of Onset    Heart Disease Mother     Parkinsonism Mother     Hypertension Mother     Cancer Sister         cervical/cervical 1970s    Hypertension Sister     High Blood Pressure Other     Hypertension Brother     Elevated Lipids Brother     Other Brother         stents     ROS   Constitutional:  Negative for fatigue, loss of appetite and unexpected weight change   HEENT            : Negative for neck stiffness and pain, no congestion or sinus pressure   Eyes                : No visual disturbance or pain   Cardiovascular: No chest pain or palpitations or leg swelling   Respiratory      : Negative for cough, shortness of breath or wheezing   Gastrointestinal: Negative for abdominal pain, constipation or diarrhea and bloating No nausea or vomiting   Genitourinary:     No urgency or frequency, no burning or hematuria   Musculoskeletal: No arthralgias, back pain or myalgias   Skin                  : Negative for rash or erythema   Neurological    : Negative for dizziness, weakness, tremors ,light headedness or syncope   Psychiatric       : Negative for dysphoric mood, sleep disturbances, nervous or anxious, or decreased concentration   All other review of systems was negative    Objective  Physical Examination:    Nursing note reviewed    /72 (Site: Right Upper Arm, Position: Sitting, Cuff Size: Medium Adult)   Pulse 76   Temp 97.7 °F (36.5 °C) (Infrared)   Resp 16   Ht 5' 3\" (1.6 m)   Wt 209 lb (94.8 kg)   BMI 37.02 kg/m²   BP Readings from Last 3 Encounters:   09/30/21 126/72   05/21/21 110/72   12/09/20 134/82         Constitutional:  Ryanne Hernandez is oriented to place, person and time ,appears well-developed and well-nourished  HEENT:  Atraumatic and normocephalic, external ears normal bilaterally, nose normal no oropharyngeal exudate and is clear and moist  Eyes:  EOCM normal; conjunctivae normal; PERRLA bilaterally  Neck:  Normal range of motion, neck supple, no JVD and no thyromegaly  Cardiovascular:  RRR, normal heart sounds and intact distal pulses  Pulmonary:  effort normal and breath sounds normal bilaterally,no wheezes or rales, no respiratory distress  Abdominal:  Soft, non-tender; normal bowel sounds, no masses  Musculoskeletal:  Normal range of motion and no edema or tenderness bilaterally  No lymphadenopathy  Neurological:  alert, oriented, and normal reflexes bilaterally  Skin: warm and dry  Psychiatric:  normal mood and effect; behavior normal.    Labs:   Lab Results   Component Value Date    LABA1C 5.5 06/01/2021     Lab Results   Component Value Date    CHOL 196 06/01/2021     Lab Results   Component Value Date    HDL 46 06/01/2021     No results found for: [address]  Lab Results   Component Value Date    TRIG 94 06/01/2021     No components found for: CHOLHDL  Lab Results   Component Value Date    WBC 8.2 06/01/2021    HGB 12.7 06/01/2021    HCT 41.9 06/01/2021    MCV 90.1 06/01/2021     06/01/2021     No results found for: INR, PROTIME  Lab Results   Component Value Date    GLUCOSE 79 06/01/2021    CREATININE 0.99 (H) 08/13/2021    BUN 18 08/13/2021     06/01/2021    K 4.4 06/01/2021     06/01/2021    CO2 27 06/01/2021     Lab Results   Component Value Date    ALT 12 06/01/2021    AST 14 06/01/2021    ALKPHOS 50 06/01/2021    BILITOT 0.62 06/01/2021     Lab Results   Component Value Date    LABPROT 7.5 11/28/2012    LABALBU 4.2 06/01/2021     Lab Results   Component Value Date    TSH 0.82 06/01/2021     Assessment:  1. Type 2 diabetes mellitus without complication, without long-term current use of insulin ([facility] [address]    2. Essential hypertension    3. Abnormal cardiovascular stress test        Plan:  Patient's hemoglobin A1c is 5.5 and not on any medications and his diet and exercise controlled  Blood pressure is stable on current medications and continue losartan and atenolol  Patient's depression is stable on Lexapro and continue same  Patient's visit to the cardiologist and stress test reviewed which showed mild reversible ischemia and patient is getting heart cath done on October 17 and will follow up after that  Patient initially had a CT of the chest done by cardiology which showed thyroid nodules which was stable and patient had evaluation done in the past by endocrinology and had a biopsy which was negative and patient's repeat ultrasound this time did not show any changes and will continue to monitor  Review in 4 months           1. Kodak received counseling on the following healthy behaviors: nutrition and exercise    2. Prior labs and health maintenance reviewed. 3.  Discussed use, benefit, and side effects of prescribed medications. Barriers to medication compliance addressed. All her questions were answered. Pt voiced understanding. Ad Hernández will continue current medications, diet and exercise. No orders of the defined types were placed in this encounter. Completed Refills               Requested Prescriptions      No prescriptions requested or ordered in this encounter     4.  Patient given educational materials - see patient instructions    5. Was a self-tracking handout given in paper form or via [employer]? NO    No orders of the defined types were placed in this encounter. Return in about 4 months (around 1/30/2022). Patient voiced understanding and agreed to treatment plan. Electronically signed by Helga Goldberg MD on 9/30/2021 at 2:21 PM    This note is created with a voice recognition program and while intend to generate a document that accurately reflects the content of the visit, no guarantee can be provided that every mistake has been identified and corrected by editing. BMI was elevated today, and weight loss plan recommended is : daily exercise regimen.

## 2021-10-05 NOTE — PATIENT INSTRUCTIONS

## 2021-10-21 ENCOUNTER — PATIENT MESSAGE (OUTPATIENT)
Dept: INTERNAL MEDICINE CLINIC | Age: 66
End: 2021-10-21

## 2021-10-21 NOTE — TELEPHONE ENCOUNTER
From: Tori Caba  To: Naila James MD  Sent: 10/21/2021 1:42 PM EDT  Subject: Non-Urgent Medical Question     ,  Is there any meds you would feel safe to give me to assist in weight loss? I am in need of assistance.

## 2021-10-26 ENCOUNTER — PATIENT MESSAGE (OUTPATIENT)
Dept: INTERNAL MEDICINE CLINIC | Age: 66
End: 2021-10-26

## 2021-10-26 NOTE — TELEPHONE ENCOUNTER
From: Amy Bonilla  To: Sharona Olson MD  Sent: 10/26/2021 2:35 PM EDT  Subject: Non-Urgent Medical Question    Doctor K,  I am going to wait a bit to start the Contrave. My BP has been on the high side these past few days. 160/88 today. Going to keep an eye on it before starting something new. I will let you know when i do start. Thank you.

## 2021-10-27 NOTE — TELEPHONE ENCOUNTER
Tried 3 times to reach [employer].   Unable to get through  PA done through  [facility] with assistance from [facility]

## 2021-11-14 ENCOUNTER — PATIENT MESSAGE (OUTPATIENT)
Dept: INTERNAL MEDICINE CLINIC | Age: 66
End: 2021-11-14

## 2021-11-14 DIAGNOSIS — N39.0 URINARY TRACT INFECTION WITHOUT HEMATURIA, SITE UNSPECIFIED: ICD-10-CM

## 2021-11-14 DIAGNOSIS — R35.0 FREQUENCY OF URINATION: Primary | ICD-10-CM

## 2021-11-15 NOTE — TELEPHONE ENCOUNTER
From: Camryn Hernández  To: Dr. Driscoll Overcast: 11/14/2021 1:03 PM EST  Subject: UTI    Dr. Jose Villanueva,  Can u send a lab script through for a urine test.  Having burning for 4 days.   Thanks  [employer]

## 2021-11-16 ENCOUNTER — HOSPITAL ENCOUNTER (OUTPATIENT)
Facility: CLINIC | Age: 66
Discharge: HOME | End: 2021-11-16
Payer: COMMERCIAL

## 2021-11-16 DIAGNOSIS — R35.0 FREQUENCY OF URINATION: ICD-10-CM

## 2021-11-16 DIAGNOSIS — N39.0 URINARY TRACT INFECTION WITHOUT HEMATURIA, SITE UNSPECIFIED: ICD-10-CM

## 2021-11-16 LAB
-: NORMAL
AMORPHOUS: NORMAL
BACTERIA: NORMAL
BILIRUBIN URINE: NEGATIVE
CASTS UA: NORMAL /LPF (ref 0–8)
COLOR: YELLOW
COMMENT UA: ABNORMAL
CRYSTALS, UA: NORMAL /HPF
EPITHELIAL CELLS UA: NORMAL /HPF (ref 0–5)
GLUCOSE URINE: NEGATIVE
KETONES, URINE: NEGATIVE
LEUKOCYTE ESTERASE, URINE: ABNORMAL
MUCUS: NORMAL
NITRITE, URINE: NEGATIVE
OTHER OBSERVATIONS UA: NORMAL
PH UA: 5.5 (ref 5–8)
PROTEIN UA: ABNORMAL
RBC UA: NORMAL /HPF (ref 0–4)
RENAL EPITHELIAL, UA: NORMAL /HPF
SPECIFIC GRAVITY UA: 1.02 (ref 1–1.03)
TRICHOMONAS: NORMAL
TURBIDITY: ABNORMAL
URINE HGB: ABNORMAL
UROBILINOGEN, URINE: NORMAL
WBC UA: NORMAL /HPF (ref 0–5)
YEAST: NORMAL

## 2021-11-16 PROCEDURE — 87086 URINE CULTURE/COLONY COUNT: CPT

## 2021-11-16 PROCEDURE — 81001 URINALYSIS AUTO W/SCOPE: CPT

## 2021-11-17 ENCOUNTER — TELEPHONE (OUTPATIENT)
Dept: INTERNAL MEDICINE CLINIC | Age: 66
End: 2021-11-17

## 2021-11-17 DIAGNOSIS — N39.0 URINARY TRACT INFECTION WITHOUT HEMATURIA, SITE UNSPECIFIED: Primary | ICD-10-CM

## 2021-11-17 LAB
CULTURE: NORMAL
Lab: NORMAL
SPECIMEN DESCRIPTION: NORMAL

## 2021-11-17 RX ORDER — CEPHALEXIN 500 MG/1
500 CAPSULE ORAL 3 TIMES DAILY
Qty: 21 CAPSULE | Refills: 0 | Status: SHIPPED | OUTPATIENT
Start: 2021-11-17 | End: 2022-04-14 | Stop reason: ALTCHOICE

## 2021-11-18 ENCOUNTER — OFFICE VISIT (OUTPATIENT)
Dept: OBGYN CLINIC | Age: 66
End: 2021-11-18
Payer: COMMERCIAL

## 2021-11-18 VITALS
DIASTOLIC BLOOD PRESSURE: 78 MMHG | WEIGHT: 209 LBS | SYSTOLIC BLOOD PRESSURE: 124 MMHG | BODY MASS INDEX: 37.03 KG/M2 | HEIGHT: 63 IN

## 2021-11-18 DIAGNOSIS — Z01.419 WOMEN'S ANNUAL ROUTINE GYNECOLOGICAL EXAMINATION: Primary | ICD-10-CM

## 2021-11-18 DIAGNOSIS — Z12.31 ENCOUNTER FOR SCREENING MAMMOGRAM FOR BREAST CANCER: ICD-10-CM

## 2021-11-18 PROCEDURE — 99397 PER PM REEVAL EST PAT 65+ YR: CPT | Performed by: OBSTETRICS & GYNECOLOGY

## 2021-11-18 RX ORDER — ROSUVASTATIN CALCIUM 10 MG/1
10 TABLET, COATED ORAL DAILY
COMMUNITY
Start: 2021-10-07

## 2021-11-18 RX ORDER — ESTRADIOL 10 UG/1
10 INSERT VAGINAL DAILY
Qty: 30 TABLET | Refills: 5 | Status: SHIPPED | OUTPATIENT
Start: 2021-11-18

## 2021-11-18 NOTE — PROGRESS NOTES
HISTORY:  OB History    Para Term  AB Living   2 1 1 0 1 1   SAB IAB Ectopic Molar Multiple Live Births   1 0 0 0 0 1      # Outcome Date GA Lbr Kwabena/2nd Weight Sex Delivery Anes PTL Lv   2 SAB            1 Term         JITENDRA       PAST MEDICAL HISTORY:   has a past medical history of Anxiety, Chronic UTI, Fibromyalgia, Hematuria due to chronic cystitis, HTN (hypertension), IBS (irritable bowel syndrome), Major depressive disorder, single episode with anxious distress, Obesity, Prediabetes, Tachycardia, Thyroid disease, and Unspecified sleep apnea. PAST SURGICAL HISTORY:   has a past surgical history that includes Tonsillectomy and adenoidectomy; Cholecystectomy; Breast reduction surgery; Colonoscopy (); Abdomen surgery; cyst removal (Bilateral); Foot Tendon Surgery (Left, 2015); Hysterectomy, total abdominal; Blepharoplasty; and Cardiac catheterization. ALLERGIES:  is allergic to contrast [iodides], bactrim [sulfamethoxazole-trimethoprim], sulfa antibiotics, and morphine. MEDICATIONS:  Prior to Admission medications    Medication Sig Start Date End Date Taking?  Authorizing Provider   rosuvastatin (CRESTOR) 10 MG tablet Take 10 mg by mouth daily 10/7/21  Yes Historical Provider, MD   cephALEXin (KEFLEX) 500 MG capsule Take 1 capsule by mouth 3 times daily 21  Yes Clayton Katz MD   colchicine (COLCRYS) 0.6 MG tablet Take 1 tablet by mouth 2 times daily for 3 days 21 Yes Clayton Katz MD   losartan (COZAAR) 100 MG tablet TAKE ONE TABLET BY MOUTH ONE TIME DAILY  21  Yes Clayton Katz MD   atenolol (TENORMIN) 50 MG tablet TAKE ONE TABLET BY MOUTH TWICE DAILY  21  Yes Clayton Katz MD   escitalopram (LEXAPRO) 10 MG tablet TAKE ONE TABLET BY MOUTH ONE TIME DAILY  21  Yes Clayton Katz MD   omeprazole (PRILOSEC) 40 MG delayed release capsule Take 1 capsule by mouth daily 19  Yes Historical Provider, MD   ibuprofen (IBU) 800 MG tablet Take 1 tablet by mouth every 6 hours as needed for Pain 11/4/19  Yes Kipp Kocher, MD   naltrexone-buPROPion (CONTRAVE) 8-90 MG per extended release tablet 1 tablet daily first week  1 tablet twice a day second week  2 tablets in a.m. and 1 tablet at night third week  2 tablets twice a day fourth week  Patient not taking: Reported on 11/18/2021 10/25/21   Kipp Kocher, MD   [facility] VERIO strip use twice daily  Patient not taking: Reported on 11/18/2021 8/2/21   Kipp Kocher, MD       FAMILY HISTORY:  Family History of Breast, Ovarian, Colon or Uterine Cancer: No   family history includes Cancer in her sister; Elevated Lipids in her brother; Heart Disease in her brother and mother; High Blood Pressure in an other family member; Hypertension in her brother, mother, and sister; Other in her brother; Parkinsonism in her mother. SOCIAL HISTORY:   reports that she has never smoked. She has never used smokeless tobacco. She reports that she does not drink alcohol and does not use drugs. VITALS:  Vitals:    11/18/21 0833   BP: 124/78   Site: Right Upper Arm   Position: Sitting   Cuff Size: Large Adult   Weight: 209 lb (94.8 kg)   Height: 5' 2.52\" (1.588 m)                                                                                                                                                                         PHYSICAL EXAM:   General Appearance: Appears healthy. Alert; in no acute distress. Pleasant. Skin: Skin color, texture, turgor normal. No rashes or lesions.   HEENT: normocephalic and atraumatic, Thyroid normal to inspection and palpation  Breast:  (Chest): normal appearance, no masses or tenderness, No nipple retraction or dimpling, No nipple discharge or bleeding, No axillary or supraclavicular adenopathy, Normal to palpation without dominant masses  Abdomen: soft, non-tender, non-distended, no right upper quadrant tenderness and no CVA tenderness  Pelvic Exam:   External genitalia: General appearance; normal, Hair distribution; normal, Lesions absent  Urinary system: urethral meatus normal  Vaginal: atrophic  Adnexa nonpalpable, uterus, cervix surgically absent  Musculoskeletal: no gross abnormalities  Extremities: non-tender BLE and non-edematous  Psych:  oriented to time, place and person       ASSESSMENT & PLAN:    Lisette Joseph is a 73 y.o. female  No LMP recorded. Patient has had a hysterectomy. here for annual exam, complaining of vaginal atrophy   - Vaginal estrogen Rx    - Discussed dilator therapy, PFT if discomfort does not improve    - Mammogram ordered   - Follow up in one year or sooner prn       Patient Active Problem List    Diagnosis Date Noted    Major depressive disorder, single episode with anxious distress 10/08/2019    History of hysterectomy for benign disease 2018    Vaginal atrophy 2018    Prediabetes     Chondromalacia patellae of right knee 2014    Patellar contusion 2014    Tachycardia     HTN (hypertension)     Fibromyalgia     Chronic UTI     IBS (irritable bowel syndrome)     Obesity     Hypercholesterolemia        Counseling Completed:    discussed need for repeat pap as per American Society for Colposcopy and Cervical Pathology guidelines. discussed need for mammograms every 1 year, If >44 yo and last mammogram was negative. discussed Calcium and Vitamin D dosing. discussed need for colonoscopy screening as well as onset for bone density testing. Routine health maintenance per patients PCP discussed. Patient was seen with total face to face time of 20 minutes. More than 50% of this visit was on counseling and education regarding the problems listed below and her options. She was also counseled on her preventative health maintenance recommendations and follow-up. Diagnosis Orders   1. Women's annual routine gynecological examination     2.  Encounter for screening mammogram for breast cancer KARI Screening Bilateral        Jayleen DO Don[place]on   Ob/Gyn   [facility], [facility]  11/18/2021, 8:53 AM

## 2021-12-06 ENCOUNTER — HOSPITAL ENCOUNTER (OUTPATIENT)
Facility: CLINIC | Age: 66
Discharge: HOME | End: 2021-12-06
Payer: COMMERCIAL

## 2021-12-06 LAB
-: NORMAL
ALBUMIN SERPL-MCNC: 4.5 G/DL (ref 3.5–5.2)
ALBUMIN/GLOBULIN RATIO: 1.6 (ref 1–2.5)
ALP BLD-CCNC: 56 U/L (ref 35–104)
ALT SERPL-CCNC: 26 U/L (ref 5–33)
AMORPHOUS: NORMAL
AMYLASE: 53 U/L (ref 28–100)
AST SERPL-CCNC: 23 U/L
BACTERIA: NORMAL
BILIRUB SERPL-MCNC: 0.64 MG/DL (ref 0.3–1.2)
BILIRUBIN DIRECT: 0.16 MG/DL
BILIRUBIN URINE: NEGATIVE
BILIRUBIN, INDIRECT: 0.48 MG/DL (ref 0–1)
CASTS UA: NORMAL /LPF (ref 0–8)
COLOR: YELLOW
COMMENT UA: ABNORMAL
CRYSTALS, UA: NORMAL /HPF
EPITHELIAL CELLS UA: NORMAL /HPF (ref 0–5)
GLOBULIN: NORMAL G/DL (ref 1.5–3.8)
GLUCOSE URINE: NEGATIVE
KETONES, URINE: NEGATIVE
LEUKOCYTE ESTERASE, URINE: ABNORMAL
LIPASE: 52 U/L (ref 13–60)
MUCUS: NORMAL
NITRITE, URINE: NEGATIVE
OTHER OBSERVATIONS UA: NORMAL
PH UA: 6 (ref 5–8)
PROTEIN UA: ABNORMAL
RBC UA: NORMAL /HPF (ref 0–4)
RENAL EPITHELIAL, UA: NORMAL /HPF
SPECIFIC GRAVITY UA: 1.02 (ref 1–1.03)
TOTAL PROTEIN: 7.3 G/DL (ref 6.4–8.3)
TRICHOMONAS: NORMAL
TURBIDITY: CLEAR
URINE HGB: NEGATIVE
UROBILINOGEN, URINE: NORMAL
WBC UA: NORMAL /HPF (ref 0–5)
YEAST: NORMAL

## 2021-12-06 PROCEDURE — 36415 COLL VENOUS BLD VENIPUNCTURE: CPT

## 2021-12-06 PROCEDURE — 83690 ASSAY OF LIPASE: CPT

## 2021-12-06 PROCEDURE — 87086 URINE CULTURE/COLONY COUNT: CPT

## 2021-12-06 PROCEDURE — 80076 HEPATIC FUNCTION PANEL: CPT

## 2021-12-06 PROCEDURE — 81001 URINALYSIS AUTO W/SCOPE: CPT

## 2021-12-06 PROCEDURE — 82150 ASSAY OF AMYLASE: CPT

## 2021-12-06 NOTE — TELEPHONE ENCOUNTER
Alirio De Leon is calling to request a refill on the following medication(s):    Medication Request:    Last filled 10/25/21 #84 with 0 RF    Requested Prescriptions     Pending Prescriptions Disp Refills    naltrexone-buPROPion (CONTRAVE) 8-90 MG per extended release tablet 84 tablet 0     Si tablet daily first week  1 tablet twice a day second week  2 tablets in a.m. and 1 tablet at night third week  2 tablets twice a day fourth week       Last Visit Date (If Applicable):      Next Visit Date:    2022

## 2021-12-07 LAB
CULTURE: NORMAL
Lab: NORMAL
SPECIMEN DESCRIPTION: NORMAL

## 2021-12-13 RX ORDER — LOSARTAN POTASSIUM 100 MG/1
TABLET ORAL
Qty: 90 TABLET | Refills: 0 | Status: SHIPPED | OUTPATIENT
Start: 2021-12-13 | End: 2022-03-11 | Stop reason: SDUPTHER

## 2021-12-13 NOTE — TELEPHONE ENCOUNTER
Uri Ardon is calling to request a refill on the following medication(s):    Medication Request:    Last filled 9/13/21 #90 with 0 RF    Requested Prescriptions     Pending Prescriptions Disp Refills    losartan (COZAAR) 100 MG tablet [Pharmacy Med Name: Losartan Potassium Oral Tablet 100 MG] 90 tablet 0     Sig: TAKE ONE TABLET BY MOUTH ONE TIME DAILY       Last Visit Date (If Applicable):  1/50/2251    Next Visit Date:    1/27/2022

## 2021-12-30 ENCOUNTER — PATIENT MESSAGE (OUTPATIENT)
Dept: INTERNAL MEDICINE CLINIC | Age: 66
End: 2021-12-30

## 2021-12-30 RX ORDER — ESCITALOPRAM OXALATE 10 MG/1
TABLET ORAL
Qty: 90 TABLET | Refills: 0 | Status: SHIPPED | OUTPATIENT
Start: 2021-12-30 | End: 2022-03-17 | Stop reason: SDUPTHER

## 2021-12-30 NOTE — TELEPHONE ENCOUNTER
Dhruv Tong is calling to request a refill on the following medication(s):    Medication Request:    Last filled 6/16/2021 #90 with 1 RF    Requested Prescriptions     Pending Prescriptions Disp Refills    escitalopram (LEXAPRO) 10 MG tablet [Pharmacy Med Name: Escitalopram Oxalate Oral Tablet 10 MG] 90 tablet 0     Sig: TAKE ONE TABLET BY MOUTH ONE TIME DAILY       Last Visit Date (If Applicable):  8/10/3431    Next Visit Date:    1/27/2022

## 2021-12-30 NOTE — TELEPHONE ENCOUNTER
From: Freda Gomez  To: Dr. Love Blue  Sent: 12/30/2021 4:15 PM EST  Subject: LEXAPRO    I NEED A REFILL ASAP.   [address]:)

## 2022-01-15 ENCOUNTER — HOSPITAL ENCOUNTER (OUTPATIENT)
Dept: MAMMOGRAPHY | Age: 67
Discharge: HOME | End: 2022-01-17
Payer: COMMERCIAL

## 2022-01-15 DIAGNOSIS — Z12.31 ENCOUNTER FOR SCREENING MAMMOGRAM FOR BREAST CANCER: ICD-10-CM

## 2022-01-15 PROCEDURE — 77063 BREAST TOMOSYNTHESIS BI: CPT

## 2022-01-31 ENCOUNTER — PATIENT MESSAGE (OUTPATIENT)
Dept: INTERNAL MEDICINE CLINIC | Age: 67
End: 2022-01-31

## 2022-01-31 RX ORDER — IBUPROFEN 800 MG/1
TABLET ORAL
Qty: 60 TABLET | Refills: 3 | Status: SHIPPED | OUTPATIENT
Start: 2022-01-31

## 2022-01-31 NOTE — TELEPHONE ENCOUNTER
Shi Hudson is calling to request a refill on the following medication(s):    Medication Request:  Requested Prescriptions     Pending Prescriptions Disp Refills    ibuprofen (ADVIL;MOTRIN) 800 MG tablet [Pharmacy Med Name: Ibuprofen Oral Tablet 800 MG] 60 tablet 0     Sig: TAKE 1 TABLET EVERY 6 HOURS AS NEEDED FOR PAIN       Last Visit Date (If Applicable):  2/31/5750    Next Visit Date:    2/7/2022

## 2022-01-31 NOTE — TELEPHONE ENCOUNTER
From: Verner Spell  To: Dr. Roland Bone: 1/31/2022 10:51 AM EST  Subject: Woody Slain [address]    I need a refill of the above medication.   Thank you

## 2022-02-04 ENCOUNTER — PATIENT MESSAGE (OUTPATIENT)
Dept: INTERNAL MEDICINE CLINIC | Age: 67
End: 2022-02-04

## 2022-02-07 ENCOUNTER — HOSPITAL ENCOUNTER (OUTPATIENT)
Facility: CLINIC | Age: 67
Discharge: HOME | End: 2022-02-07
Payer: COMMERCIAL

## 2022-02-07 ENCOUNTER — HOSPITAL ENCOUNTER (OUTPATIENT)
Facility: CLINIC | Age: 67
Discharge: HOME | End: 2022-02-09
Payer: COMMERCIAL

## 2022-02-07 ENCOUNTER — OFFICE VISIT (OUTPATIENT)
Dept: INTERNAL MEDICINE CLINIC | Age: 67
End: 2022-02-07
Payer: COMMERCIAL

## 2022-02-07 ENCOUNTER — HOSPITAL ENCOUNTER (OUTPATIENT)
Dept: GENERAL RADIOLOGY | Facility: CLINIC | Age: 67
Discharge: HOME | End: 2022-02-09
Payer: COMMERCIAL

## 2022-02-07 VITALS
OXYGEN SATURATION: 97 % | DIASTOLIC BLOOD PRESSURE: 84 MMHG | SYSTOLIC BLOOD PRESSURE: 138 MMHG | TEMPERATURE: 97.3 F | HEART RATE: 74 BPM

## 2022-02-07 DIAGNOSIS — M54.9 INTRACTABLE BACK PAIN: ICD-10-CM

## 2022-02-07 DIAGNOSIS — E11.9 TYPE 2 DIABETES MELLITUS WITHOUT COMPLICATION, WITHOUT LONG-TERM CURRENT USE OF INSULIN (HCC): ICD-10-CM

## 2022-02-07 DIAGNOSIS — I10 ESSENTIAL HYPERTENSION: ICD-10-CM

## 2022-02-07 DIAGNOSIS — M54.9 INTRACTABLE BACK PAIN: Primary | ICD-10-CM

## 2022-02-07 LAB
ALBUMIN SERPL-MCNC: 4.6 G/DL (ref 3.5–5.2)
ALBUMIN/GLOBULIN RATIO: 1.6 (ref 1–2.5)
ALP BLD-CCNC: 67 U/L (ref 35–104)
ALT SERPL-CCNC: 17 U/L (ref 5–33)
ALT SERPL-CCNC: 17 U/L (ref 5–33)
ANION GAP SERPL CALCULATED.3IONS-SCNC: 12 MMOL/L (ref 9–17)
AST SERPL-CCNC: 18 U/L
AST SERPL-CCNC: 18 U/L
BILIRUB SERPL-MCNC: 0.57 MG/DL (ref 0.3–1.2)
BUN BLDV-MCNC: 24 MG/DL (ref 8–23)
BUN/CREAT BLD: ABNORMAL (ref 9–20)
CALCIUM SERPL-MCNC: 9.8 MG/DL (ref 8.6–10.4)
CHLORIDE BLD-SCNC: 102 MMOL/L (ref 98–107)
CHOLESTEROL, FASTING: 141 MG/DL
CHOLESTEROL/HDL RATIO: 3.1
CO2: 26 MMOL/L (ref 20–31)
CREAT SERPL-MCNC: 1.07 MG/DL (ref 0.5–0.9)
CREATININE URINE: 30.6 MG/DL (ref 28–217)
GFR AFRICAN AMERICAN: >60 ML/MIN
GFR NON-AFRICAN AMERICAN: 51 ML/MIN
GFR SERPL CREATININE-BSD FRML MDRD: ABNORMAL ML/MIN/{1.73_M2}
GFR SERPL CREATININE-BSD FRML MDRD: ABNORMAL ML/MIN/{1.73_M2}
GLUCOSE BLD-MCNC: 87 MG/DL (ref 70–99)
HDLC SERPL-MCNC: 45 MG/DL
LDL CHOLESTEROL: 68 MG/DL (ref 0–130)
MICROALBUMIN/CREAT 24H UR: 159 MG/L
MICROALBUMIN/CREAT UR-RTO: 520 MCG/MG CREAT
POTASSIUM SERPL-SCNC: 4.6 MMOL/L (ref 3.7–5.3)
SODIUM BLD-SCNC: 140 MMOL/L (ref 135–144)
THYROXINE, FREE: 1.24 NG/DL (ref 0.93–1.7)
TOTAL CK: 42 U/L (ref 26–192)
TOTAL PROTEIN: 7.4 G/DL (ref 6.4–8.3)
TRIGLYCERIDE, FASTING: 142 MG/DL
TSH SERPL DL<=0.05 MIU/L-ACNC: 1.05 MIU/L (ref 0.3–5)
VLDLC SERPL CALC-MCNC: NORMAL MG/DL (ref 1–30)

## 2022-02-07 PROCEDURE — 36415 COLL VENOUS BLD VENIPUNCTURE: CPT

## 2022-02-07 PROCEDURE — 80053 COMPREHEN METABOLIC PANEL: CPT

## 2022-02-07 PROCEDURE — 84443 ASSAY THYROID STIM HORMONE: CPT

## 2022-02-07 PROCEDURE — 84450 TRANSFERASE (AST) (SGOT): CPT

## 2022-02-07 PROCEDURE — 80061 LIPID PANEL: CPT

## 2022-02-07 PROCEDURE — 82570 ASSAY OF URINE CREATININE: CPT

## 2022-02-07 PROCEDURE — 82043 UR ALBUMIN QUANTITATIVE: CPT

## 2022-02-07 PROCEDURE — 99214 OFFICE O/P EST MOD 30 MIN: CPT | Performed by: INTERNAL MEDICINE

## 2022-02-07 PROCEDURE — 72070 X-RAY EXAM THORAC SPINE 2VWS: CPT

## 2022-02-07 PROCEDURE — 84460 ALANINE AMINO (ALT) (SGPT): CPT

## 2022-02-07 PROCEDURE — 83036 HEMOGLOBIN GLYCOSYLATED A1C: CPT

## 2022-02-07 PROCEDURE — 84439 ASSAY OF FREE THYROXINE: CPT

## 2022-02-07 PROCEDURE — 72100 X-RAY EXAM L-S SPINE 2/3 VWS: CPT

## 2022-02-07 PROCEDURE — 82550 ASSAY OF CK (CPK): CPT

## 2022-02-07 RX ORDER — HYDROCODONE BITARTRATE AND ACETAMINOPHEN 5; 325 MG/1; MG/1
1 TABLET ORAL EVERY 8 HOURS PRN
Qty: 15 TABLET | Refills: 0 | Status: SHIPPED | OUTPATIENT
Start: 2022-02-07 | End: 2022-02-12

## 2022-02-07 RX ORDER — LANSOPRAZOLE 30 MG/1
CAPSULE, DELAYED RELEASE ORAL
COMMUNITY
Start: 2022-01-11 | End: 2022-04-08 | Stop reason: ALTCHOICE

## 2022-02-07 RX ORDER — PREDNISONE 10 MG/1
10 TABLET ORAL
Qty: 15 TABLET | Refills: 0 | Status: SHIPPED | OUTPATIENT
Start: 2022-02-07 | End: 2022-02-12

## 2022-02-07 SDOH — ECONOMIC STABILITY: FOOD INSECURITY: WITHIN THE PAST 12 MONTHS, THE FOOD YOU BOUGHT JUST DIDN'T LAST AND YOU DIDN'T HAVE MONEY TO GET MORE.: NEVER TRUE

## 2022-02-07 SDOH — ECONOMIC STABILITY: FOOD INSECURITY: WITHIN THE PAST 12 MONTHS, YOU WORRIED THAT YOUR FOOD WOULD RUN OUT BEFORE YOU GOT MONEY TO BUY MORE.: NEVER TRUE

## 2022-02-07 ASSESSMENT — SOCIAL DETERMINANTS OF HEALTH (SDOH): HOW HARD IS IT FOR YOU TO PAY FOR THE VERY BASICS LIKE FOOD, HOUSING, MEDICAL CARE, AND HEATING?: NOT HARD AT ALL

## 2022-02-07 NOTE — PROGRESS NOTES
Alicia Au is a 73 y.o. female who presents for   Chief Complaint   Patient presents with    Back Pain     Last Saturday I lifted a desk and put it into my trunk. Since then I have been in severe pain. Taking ibuprofen at least once or twice a day with Tylenol.  Medication Refill     needs med refills    Health Maintenance     refused vaccines    and follow up of chronic medical problems. Patient Active Problem List   Diagnosis    Tachycardia    HTN (hypertension)    Fibromyalgia    Chronic UTI    IBS (irritable bowel syndrome)    Obesity    Hypercholesterolemia    Chondromalacia patellae of right knee    Patellar contusion    Prediabetes    History of hysterectomy for benign disease    Vaginal atrophy    Major depressive disorder, single episode with anxious distress     HPI  Here for evaluation for intractable back pain started about 10 days back after lifting heavy objects and have difficulty lying down or standing or bending    Current Outpatient Medications   Medication Sig Dispense Refill    lansoprazole (PREVACID) 30 MG delayed release capsule       predniSONE (DELTASONE) 10 MG tablet Take 1 tablet by mouth 3 times daily (with meals) for 5 days 15 tablet 0    HYDROcodone-acetaminophen (NORCO) 5-325 MG per tablet Take 1 tablet by mouth every 8 hours as needed for Pain for up to 5 days. 15 tablet 0    ibuprofen (ADVIL;MOTRIN) 800 MG tablet TAKE 1 TABLET EVERY 6 HOURS AS NEEDED FOR PAIN 60 tablet 3    escitalopram (LEXAPRO) 10 MG tablet TAKE ONE TABLET BY MOUTH ONE TIME DAILY 90 tablet 0    losartan (COZAAR) 100 MG tablet TAKE ONE TABLET BY MOUTH ONE TIME DAILY 90 tablet 0    naltrexone-buPROPion (CONTRAVE) 8-90 MG per extended release tablet 2 tablets twice a day 120 tablet 0    rosuvastatin (CRESTOR) 10 MG tablet Take 10 mg by mouth daily      Estradiol (VAGIFEM) 10 MCG TABS vaginal tablet Place 1 tablet vaginally daily For 2 weeks then place one tablet twice a week.  27 tablet 5    atenolol (TENORMIN) 50 MG tablet TAKE ONE TABLET BY MOUTH TWICE DAILY  180 tablet 0    ONETOUCH VERIO strip use twice daily 100 each 0    cephALEXin (KEFLEX) 500 MG capsule Take 1 capsule by mouth 3 times daily (Patient not taking: Reported on 2/7/2022) 21 capsule 0    colchicine (COLCRYS) 0.6 MG tablet Take 1 tablet by mouth 2 times daily for 3 days 6 tablet 3     No current facility-administered medications for this visit.        Allergies   Allergen Reactions    Contrast [Iodides] Hives     Pt had hives during an IVP    Bactrim [Sulfamethoxazole-Trimethoprim] Hives    Sulfa Antibiotics     Morphine Nausea And Vomiting       Past Medical History:   Diagnosis Date    Anxiety     Chronic UTI     Fibromyalgia     Hematuria due to chronic cystitis     HTN (hypertension)     IBS (irritable bowel syndrome)     Major depressive disorder, single episode with anxious distress 10/8/2019    Obesity     Prediabetes     Tachycardia     Thyroid disease     nodules, hurshimoto disease    Unspecified sleep apnea     does not use machine       Past Surgical History:   Procedure Laterality Date    ABDOMEN SURGERY      abdominoplasty 2002    BLEPHAROPLASTY      BREAST REDUCTION SURGERY      CARDIAC CATHETERIZATION      clearing blockage w/ medication 80%    CHOLECYSTECTOMY      COLONOSCOPY  2013    10 yrs    CYST REMOVAL Bilateral     feet    FOOT TENDON SURGERY Left 12/02/2015    HYSTERECTOMY, TOTAL ABDOMINAL      with BSO    TONSILLECTOMY AND ADENOIDECTOMY         Family History   Problem Relation Age of Onset    Heart Disease Mother     Parkinsonism Mother     Hypertension Mother     Cancer Sister         cervical/cervical 1970s    Hypertension Sister     Heart Disease Brother         18 stints    Hypertension Brother     Elevated Lipids Brother     Other Brother         stents    High Blood Pressure Other      ROS   Constitutional:  Negative for fatigue, loss of appetite and unexpected weight change   HEENT            : Negative for neck stiffness and pain, no congestion or sinus pressure   Eyes                : No visual disturbance or pain   Cardiovascular: No chest pain or palpitations or leg swelling   Respiratory      : Negative for cough, shortness of breath or wheezing   Gastrointestinal: Negative for abdominal pain, constipation or diarrhea and bloating No nausea or vomiting   Genitourinary:     No urgency or frequency, no burning or hematuria   Musculoskeletal: Positive for arthralgias, back pain or myalgias   Skin                  : Negative for rash or erythema   Neurological    : Negative for dizziness, weakness, tremors ,light headedness or syncope   Psychiatric       : Negative for dysphoric mood, sleep disturbances, nervous or anxious, or decreased concentration   All other review of systems was negative    Objective  Physical Examination:    Nursing note reviewed    /84   Pulse 74   Temp 97.3 °F (36.3 °C)   SpO2 97%   BP Readings from Last 3 Encounters:   02/07/22 138/84   11/18/21 124/78   09/30/21 126/72         Constitutional:  Kathie Pettit is oriented to place, person and time ,appears well-developed and well-nourished  HEENT:  Atraumatic and normocephalic, external ears normal bilaterally, nose normal no oropharyngeal exudate and is clear and moist  Eyes:  EOCM normal; conjunctivae normal; PERRLA bilaterally  Neck:  Normal range of motion, neck supple, no JVD and no thyromegaly  Cardiovascular:  RRR, normal heart sounds and intact distal pulses  Pulmonary:  effort normal and breath sounds normal bilaterally,no wheezes or rales, no respiratory distress  Abdominal:  Soft, non-tender; normal bowel sounds, no masses  Musculoskeletal: Very limited range of motion and no edema or tenderness bilaterally and tenderness present in the paraspinal and also T11-T12 spine  No lymphadenopathy  Neurological:  alert, oriented, and normal reflexes bilaterally  Skin: warm and dry  Psychiatric:  normal mood and effect; behavior normal.    Labs:   Lab Results   Component Value Date    LABA1C 5.5 06/01/2021     Lab Results   Component Value Date    CHOL 196 06/01/2021     Lab Results   Component Value Date    HDL 46 06/01/2021     No results found for: [address]  Lab Results   Component Value Date    TRIG 94 06/01/2021     No components found for: CHOLHDL  Lab Results   Component Value Date    WBC 8.2 06/01/2021    HGB 12.7 06/01/2021    HCT 41.9 06/01/2021    MCV 90.1 06/01/2021     06/01/2021     No results found for: INR, PROTIME  Lab Results   Component Value Date    GLUCOSE 79 06/01/2021    CREATININE 0.99 (H) 08/13/2021    BUN 18 08/13/2021     06/01/2021    K 4.4 06/01/2021     06/01/2021    CO2 27 06/01/2021     Lab Results   Component Value Date    ALT 26 12/06/2021    AST 23 12/06/2021    ALKPHOS 56 12/06/2021    BILITOT 0.64 12/06/2021     Lab Results   Component Value Date    LABPROT 7.5 11/28/2012    LABALBU 4.5 12/06/2021     Lab Results   Component Value Date    TSH 0.82 06/01/2021     Assessment:  1. Intractable back pain    2. Type 2 diabetes mellitus without complication, without long-term current use of insulin ([address]    3. Essential hypertension        Plan:  Patient had significant back pain and limitation of movements and also tender at T11-T12 area and so patient is given a prescription for Norco to take it as needed and also started on prednisone 10 mg 3 times daily for 5 days  X-rays of the lumbar spine and thoracic spine ordered to evaluate for any occult fracture  Blood pressure is stable on current medications  Patient had no recent hemoglobin A1c done and patient is going to get labs ordered by cardiology as patient is taking rosuvastatin and so I did give her another lab slip to check for CMP urine for microalbumin and hemoglobin A1c  Patient to call me back next week if no improvement with her back pain  Review as scheduled         1. [employer] received counseling on the following healthy behaviors: nutrition and exercise    2. Prior labs and health maintenance reviewed. 3.  Discussed use, benefit, and side effects of prescribed medications. Barriers to medication compliance addressed. All her questions were answered. Pt voiced understanding. Mary Ellen Pineda will continue current medications, diet and exercise. Orders Placed This Encounter   Medications    predniSONE (DELTASONE) 10 MG tablet     Sig: Take 1 tablet by mouth 3 times daily (with meals) for 5 days     Dispense:  15 tablet     Refill:  0    HYDROcodone-acetaminophen (NORCO) 5-325 MG per tablet     Sig: Take 1 tablet by mouth every 8 hours as needed for Pain for up to 5 days. Dispense:  15 tablet     Refill:  0     Reduce doses taken as pain becomes manageable          Completed Refills               Requested Prescriptions     Signed Prescriptions Disp Refills    predniSONE (DELTASONE) 10 MG tablet 15 tablet 0     Sig: Take 1 tablet by mouth 3 times daily (with meals) for 5 days    HYDROcodone-acetaminophen (NORCO) 5-325 MG per tablet 15 tablet 0     Sig: Take 1 tablet by mouth every 8 hours as needed for Pain for up to 5 days. 4. Patient given educational materials - see patient instructions    5. Was a self-tracking handout given in paper form or via SMS [employer]?   NO    Orders Placed This Encounter   Procedures    XR THORACIC SPINE (2 VIEWS)     Standing Status:   Future     Standing Expiration Date:   2/7/2023     Order Specific Question:   Reason for exam:     Answer:   Mid back pain    XR LUMBAR SPINE (2-3 VIEWS)     Standing Status:   Future     Standing Expiration Date:   2/7/2023    Hemoglobin A1C     Standing Status:   Future     Standing Expiration Date:   2/7/2023    Microalbumin / Creatinine Urine Ratio     Standing Status:   Future     Standing Expiration Date:   2/7/2023    TSH without Reflex     Standing Status:   Future     Standing Expiration Date:   2/7/2023    T4, Free     Standing Status:   Future     Standing Expiration Date:   2/7/2023    Comprehensive Metabolic Panel     Standing Status:   Future     Standing Expiration Date:   2/7/2023     No follow-ups on file. Patient voiced understanding and agreed to treatment plan. Electronically signed by Carlos Foster MD on 2/7/2022 at 1:03 PM    This note is created with a voice recognition program and while intend to generate a document that accurately reflects the content of the visit, no guarantee can be provided that every mistake has been identified and corrected by editing.

## 2022-02-08 LAB
ESTIMATED AVERAGE GLUCOSE: 123 MG/DL
HBA1C MFR BLD: 5.9 % (ref 4–6)

## 2022-02-08 NOTE — TELEPHONE ENCOUNTER
From: Ami Mcdaniel  Sent: 2/8/2022 1:31 PM EST  To: Sharron  Alexx  Clinical Staff  Subject: Severe back pain    So good seeing you Doctor CONRAD. I received the results in my chart from my back exrays. I tried to read it but it all CHINESE:) to me. The Predisone seems to be helping . .. i started them today. I took one pain pill to sleep last night. .. David Alexander!!

## 2022-03-11 RX ORDER — LOSARTAN POTASSIUM 100 MG/1
TABLET ORAL
Qty: 90 TABLET | Refills: 0 | Status: SHIPPED | OUTPATIENT
Start: 2022-03-11 | End: 2022-06-09

## 2022-03-11 NOTE — TELEPHONE ENCOUNTER
Julian Salgado is calling to request a refill on the following medication(s):    Medication Request:  Requested Prescriptions     Pending Prescriptions Disp Refills    losartan (COZAAR) 100 MG tablet [Pharmacy Med Name: Losartan Potassium Oral Tablet 100 MG] 90 tablet 0     Sig: Take one tablet by mouth once a day     Last filled 12/13/21 90 day no refill  Order pending    Last Visit Date (If Applicable):  8/6/1994    Next Visit Date:    Visit date not found

## 2022-03-17 RX ORDER — ESCITALOPRAM OXALATE 10 MG/1
TABLET ORAL
Qty: 90 TABLET | Refills: 0 | Status: SHIPPED | OUTPATIENT
Start: 2022-03-17 | End: 2022-07-01 | Stop reason: SDUPTHER

## 2022-03-17 NOTE — TELEPHONE ENCOUNTER
Peace Wiseman is calling to request a refill on the following medication(s):    Medication Request:  Requested Prescriptions     Pending Prescriptions Disp Refills    escitalopram (LEXAPRO) 10 MG tablet 90 tablet 0       Last Visit Date (If Applicable):  6/3/3342    Next Visit Date:    Visit date not found

## 2022-04-06 ENCOUNTER — TELEMEDICINE (OUTPATIENT)
Dept: INTERNAL MEDICINE CLINIC | Age: 67
End: 2022-04-06
Payer: COMMERCIAL

## 2022-04-06 ENCOUNTER — PATIENT MESSAGE (OUTPATIENT)
Dept: INTERNAL MEDICINE CLINIC | Age: 67
End: 2022-04-06

## 2022-04-06 DIAGNOSIS — R05.9 COUGH: Primary | ICD-10-CM

## 2022-04-06 PROCEDURE — 99443 PR PHYS/QHP TELEPHONE EVALUATION 21-30 MIN: CPT | Performed by: INTERNAL MEDICINE

## 2022-04-06 RX ORDER — OMEPRAZOLE 20 MG/1
20 CAPSULE, DELAYED RELEASE ORAL 2 TIMES DAILY
COMMUNITY
End: 2022-04-08 | Stop reason: ALTCHOICE

## 2022-04-06 RX ORDER — MONTELUKAST SODIUM 10 MG/1
10 TABLET ORAL DAILY
Qty: 30 TABLET | Refills: 0 | Status: SHIPPED | OUTPATIENT
Start: 2022-04-06 | End: 2022-06-14

## 2022-04-06 RX ORDER — FLUTICASONE PROPIONATE 50 MCG
2 SPRAY, SUSPENSION (ML) NASAL DAILY
Qty: 16 G | Refills: 0 | Status: SHIPPED | OUTPATIENT
Start: 2022-04-06 | End: 2022-06-14

## 2022-04-06 ASSESSMENT — PATIENT HEALTH QUESTIONNAIRE - PHQ9
4. FEELING TIRED OR HAVING LITTLE ENERGY: 0
10. IF YOU CHECKED OFF ANY PROBLEMS, HOW DIFFICULT HAVE THESE PROBLEMS MADE IT FOR YOU TO DO YOUR WORK, TAKE CARE OF THINGS AT HOME, OR GET ALONG WITH OTHER PEOPLE: 0
SUM OF ALL RESPONSES TO PHQ QUESTIONS 1-9: 0
5. POOR APPETITE OR OVEREATING: 0
3. TROUBLE FALLING OR STAYING ASLEEP: 0
2. FEELING DOWN, DEPRESSED OR HOPELESS: 0
9. THOUGHTS THAT YOU WOULD BE BETTER OFF DEAD, OR OF HURTING YOURSELF: 0
SUM OF ALL RESPONSES TO PHQ QUESTIONS 1-9: 0
7. TROUBLE CONCENTRATING ON THINGS, SUCH AS READING THE NEWSPAPER OR WATCHING TELEVISION: 0
SUM OF ALL RESPONSES TO PHQ9 QUESTIONS 1 & 2: 0
6. FEELING BAD ABOUT YOURSELF - OR THAT YOU ARE A FAILURE OR HAVE LET YOURSELF OR YOUR FAMILY DOWN: 0
SUM OF ALL RESPONSES TO PHQ QUESTIONS 1-9: 0
SUM OF ALL RESPONSES TO PHQ QUESTIONS 1-9: 0
8. MOVING OR SPEAKING SO SLOWLY THAT OTHER PEOPLE COULD HAVE NOTICED. OR THE OPPOSITE, BEING SO FIGETY OR RESTLESS THAT YOU HAVE BEEN MOVING AROUND A LOT MORE THAN USUAL: 0
1. LITTLE INTEREST OR PLEASURE IN DOING THINGS: 0

## 2022-04-06 NOTE — PROGRESS NOTES
Leola Wood is a 73 y.o. female evaluated via telephone on 4/6/2022 for Cough (pt states shes had a dry cough that comes and goes for the past two weeks. covid test was negative.  ), Health Maintenance (dfoot, hiv, tdap, shing, dexa, deye, pneumo, covid. ), and Nasal Congestion (pt states shes having some nasal congestion also for the past couple of weeks. )  . Documentation:  I communicated with the patient and/or health care decision maker about complains of dry cough and nasal congestion denies any fever or chills it got better and then came back again. Details of this discussion including any medical advice provided: Advised to restart theprep Flonase nasal spray 2 sprays daily and also Singulair 10 mg daily and call me back next week if no improvement  Review as scheduled    Total Time: minutes: 21-30 minutes    Leloa Wood was evaluated through a synchronous (real-time) audio encounter. Patient identification was verified at the start of the visit. She (or guardian if applicable) is aware that this is a billable service, which includes applicable co-pays. This visit was conducted with the patient's (and/or legal guardian's) verbal consent. She has not had a related appointment within my department in the past 7 days or scheduled within the next 24 hours. The patient was located in a state where the provider was licensed to provide care.     Note: not billable if this call serves to triage the patient into an appointment for the relevant concern    Paulett Siemens, MD

## 2022-04-06 NOTE — TELEPHONE ENCOUNTER
From: Willie Sierra  To: Dr. Patino Clinchco: 4/6/2022 10:27 AM EDT  Subject: Video call    I am at work. .have been waiting since 10 am for video call. Cant wait any longer.   Sorry

## 2022-04-14 ENCOUNTER — OFFICE VISIT (OUTPATIENT)
Dept: INTERNAL MEDICINE CLINIC | Age: 67
End: 2022-04-14
Payer: COMMERCIAL

## 2022-04-14 VITALS
DIASTOLIC BLOOD PRESSURE: 70 MMHG | HEART RATE: 71 BPM | WEIGHT: 201.8 LBS | OXYGEN SATURATION: 99 % | SYSTOLIC BLOOD PRESSURE: 122 MMHG | TEMPERATURE: 98.2 F | BODY MASS INDEX: 35.75 KG/M2 | HEIGHT: 63 IN

## 2022-04-14 DIAGNOSIS — M54.50 CHRONIC MIDLINE LOW BACK PAIN, UNSPECIFIED WHETHER SCIATICA PRESENT: ICD-10-CM

## 2022-04-14 DIAGNOSIS — J30.9 ALLERGIC SINUSITIS: ICD-10-CM

## 2022-04-14 DIAGNOSIS — N28.1 KIDNEY CYST, ACQUIRED: Primary | ICD-10-CM

## 2022-04-14 DIAGNOSIS — G89.29 CHRONIC MIDLINE LOW BACK PAIN, UNSPECIFIED WHETHER SCIATICA PRESENT: ICD-10-CM

## 2022-04-14 PROCEDURE — 99214 OFFICE O/P EST MOD 30 MIN: CPT | Performed by: INTERNAL MEDICINE

## 2022-04-14 ASSESSMENT — PATIENT HEALTH QUESTIONNAIRE - PHQ9
4. FEELING TIRED OR HAVING LITTLE ENERGY: 0
SUM OF ALL RESPONSES TO PHQ9 QUESTIONS 1 & 2: 0
SUM OF ALL RESPONSES TO PHQ QUESTIONS 1-9: 0
7. TROUBLE CONCENTRATING ON THINGS, SUCH AS READING THE NEWSPAPER OR WATCHING TELEVISION: 0
3. TROUBLE FALLING OR STAYING ASLEEP: 0
6. FEELING BAD ABOUT YOURSELF - OR THAT YOU ARE A FAILURE OR HAVE LET YOURSELF OR YOUR FAMILY DOWN: 0
5. POOR APPETITE OR OVEREATING: 0
1. LITTLE INTEREST OR PLEASURE IN DOING THINGS: 0
SUM OF ALL RESPONSES TO PHQ QUESTIONS 1-9: 0
SUM OF ALL RESPONSES TO PHQ QUESTIONS 1-9: 0
9. THOUGHTS THAT YOU WOULD BE BETTER OFF DEAD, OR OF HURTING YOURSELF: 0
2. FEELING DOWN, DEPRESSED OR HOPELESS: 0
8. MOVING OR SPEAKING SO SLOWLY THAT OTHER PEOPLE COULD HAVE NOTICED. OR THE OPPOSITE, BEING SO FIGETY OR RESTLESS THAT YOU HAVE BEEN MOVING AROUND A LOT MORE THAN USUAL: 0
SUM OF ALL RESPONSES TO PHQ QUESTIONS 1-9: 0
10. IF YOU CHECKED OFF ANY PROBLEMS, HOW DIFFICULT HAVE THESE PROBLEMS MADE IT FOR YOU TO DO YOUR WORK, TAKE CARE OF THINGS AT HOME, OR GET ALONG WITH OTHER PEOPLE: 0

## 2022-04-14 NOTE — PROGRESS NOTES
Salina Schilder is a 73 y.o. female who presents for   Chief Complaint   Patient presents with    Discuss Labs     kidney ultra sound done at [facility] Maintenance     pneumo, d foot eye, hiv, tdap, shingles, dexa,     Follow-up     discuss pain management dr    and follow up of chronic medical problems. Patient Active Problem List   Diagnosis    Tachycardia    HTN (hypertension)    Fibromyalgia    Chronic UTI    IBS (irritable bowel syndrome)    Obesity    Hypercholesterolemia    Chondromalacia patellae of right knee    Patellar contusion    Prediabetes    History of hysterectomy for benign disease    Vaginal atrophy    Major depressive disorder, single episode with anxious distress     HPI  Here for follow-up on sinus problems and also wants to discuss about pain management and the recent ultrasound for the kidneys    Current Outpatient Medications   Medication Sig Dispense Refill    Psyllium (DAILY FIBER PO) Take by mouth      fluticasone (FLONASE) 50 MCG/ACT nasal spray 2 sprays by Nasal route daily 16 g 0    montelukast (SINGULAIR) 10 MG tablet Take 1 tablet by mouth daily 30 tablet 0    escitalopram (LEXAPRO) 10 MG tablet TAKE ONE TABLET BY MOUTH ONE TIME DAILY 90 tablet 0    losartan (COZAAR) 100 MG tablet Take one tablet by mouth once a day 90 tablet 0    ibuprofen (ADVIL;MOTRIN) 800 MG tablet TAKE 1 TABLET EVERY 6 HOURS AS NEEDED FOR PAIN 60 tablet 3    rosuvastatin (CRESTOR) 10 MG tablet Take 10 mg by mouth daily      Estradiol (VAGIFEM) 10 MCG TABS vaginal tablet Place 1 tablet vaginally daily For 2 weeks then place one tablet twice a week. 30 tablet 5    atenolol (TENORMIN) 50 MG tablet TAKE ONE TABLET BY MOUTH TWICE DAILY  180 tablet 0     No current facility-administered medications for this visit.        Allergies   Allergen Reactions    Contrast [Iodides] Hives     Pt had hives during an IVP    Bactrim [Sulfamethoxazole-Trimethoprim] Hives    Sulfa Antibiotics     Morphine Nausea And Vomiting       Past Medical History:   Diagnosis Date    Anxiety     Chronic UTI     Fibromyalgia     Hematuria due to chronic cystitis     HTN (hypertension)     IBS (irritable bowel syndrome)     Major depressive disorder, single episode with anxious distress 10/8/2019    Obesity     Prediabetes     Tachycardia     Thyroid disease     nodules, hurshimoto disease    Unspecified sleep apnea     does not use machine       Past Surgical History:   Procedure Laterality Date    ABDOMEN SURGERY      abdominoplasty 2002    BLEPHAROPLASTY      BREAST REDUCTION SURGERY      CARDIAC CATHETERIZATION      clearing blockage w/ medication 80%    CHOLECYSTECTOMY      COLONOSCOPY  2013    10 yrs    CYST REMOVAL Bilateral     feet    FOOT TENDON SURGERY Left 12/02/2015    HYSTERECTOMY, TOTAL ABDOMINAL      with BSO    TONSILLECTOMY AND ADENOIDECTOMY         Family History   Problem Relation Age of Onset    Heart Disease Mother    [facility] Parkinsonism Mother     Hypertension Mother     Cancer Sister         cervical/cervical 1970s    Hypertension Sister     Heart Disease Brother         18 stints    Hypertension Brother     Elevated Lipids Brother     Other Brother         stents    High Blood Pressure Other      ROS   Constitutional:  Negative for fatigue, loss of appetite and unexpected weight change   HEENT            : Negative for neck stiffness and pain, no congestion or sinus pressure   Eyes                : No visual disturbance or pain   Cardiovascular: No chest pain or palpitations or leg swelling   Respiratory      : Negative for cough, shortness of breath or wheezing   Gastrointestinal: Negative for abdominal pain, constipation or diarrhea and bloating No nausea or vomiting   Genitourinary:     No urgency or frequency, no burning or hematuria   Musculoskeletal: No arthralgias, back pain or myalgias   Skin                  : Negative for rash or erythema   Neurological    : Negative for dizziness, weakness, tremors ,light headedness or syncope   Psychiatric       : Negative for dysphoric mood, sleep disturbances, nervous or anxious, or decreased concentration   All other review of systems was negative    Objective  Physical Examination:    Nursing note reviewed    /70 (Site: Left Upper Arm, Position: Sitting, Cuff Size: Large Adult)   Pulse 71   Temp 98.2 °F (36.8 °C) (Temporal)   Ht 5' 2.52\" (1.588 m)   Wt 201 lb 12.8 oz (91.5 kg)   SpO2 99%   Breastfeeding No   BMI 36.30 kg/m²   BP Readings from Last 3 Encounters:   04/14/22 122/70   02/07/22 138/84   11/18/21 124/78         Constitutional:  Lenore Julien is oriented to place, person and time ,appears well-developed and well-nourished  HEENT:  Atraumatic and normocephalic, external ears normal bilaterally, nose normal no oropharyngeal exudate and is clear and moist  Eyes:  EOCM normal; conjunctivae normal; PERRLA bilaterally  Neck:  Normal range of motion, neck supple, no JVD and no thyromegaly  Cardiovascular:  RRR, normal heart sounds and intact distal pulses  Pulmonary:  effort normal and breath sounds normal bilaterally,no wheezes or rales, no respiratory distress  Abdominal:  Soft, non-tender; normal bowel sounds, no masses  Musculoskeletal:  Normal range of motion and no edema or tenderness bilaterally  No lymphadenopathy  Neurological:  alert, oriented, and normal reflexes bilaterally  Skin: warm and dry  Psychiatric:  normal mood and effect; behavior normal.    Labs:   Lab Results   Component Value Date    LABA1C 5.9 02/07/2022     Lab Results   Component Value Date    CHOL 196 06/01/2021     Lab Results   Component Value Date    HDL 45 02/07/2022     No results found for: [address]  Lab Results   Component Value Date    TRIG 94 06/01/2021     No components found for: CHOLHDL  Lab Results   Component Value Date    WBC 8.2 06/01/2021    HGB 12.7 06/01/2021    HCT 41.9 06/01/2021    MCV 90.1 06/01/2021     06/01/2021     No results found for: INR, PROTIME  Lab Results   Component Value Date    GLUCOSE 87 02/07/2022    CREATININE 1.07 (H) 02/07/2022    BUN 24 (H) 02/07/2022     02/07/2022    K 4.6 02/07/2022     02/07/2022    CO2 26 02/07/2022     Lab Results   Component Value Date    ALT 17 02/07/2022    AST 18 02/07/2022    ALKPHOS 67 02/07/2022    BILITOT 0.57 02/07/2022     Lab Results   Component Value Date    LABPROT 7.5 11/28/2012    LABALBU 4.6 02/07/2022     Lab Results   Component Value Date    TSH 1.05 02/07/2022     Assessment:  1. Kidney cyst, acquired    2. Chronic midline low back pain, unspecified whether sciatica present    3. Allergic sinusitis        Plan:  Kidney ultrasound showed stable cysts and patient following with urology and was thought to be benign as it is more than 3 years and explained to the patient  Patient's back pain is getting worse and patient wants to see a different pain management doctor and is going to call Dr. Swapnil Granados and if he is not available then she will call me back and we will discuss about a different pain management doctor  Patient's allergy issues are still bothering but even though better than last week and advised her to continue the nose spray and the Singulair and call me back next week if no improvement and patient is also taking omeprazole 40 mg twice a day and advised her to decrease to once a day once she feels better  Review as scheduled           1. Kodak received counseling on the following healthy behaviors: nutrition and exercise    2. Prior labs and health maintenance reviewed. 3.  Discussed use, benefit, and side effects of prescribed medications. Barriers to medication compliance addressed. All her questions were answered. Pt voiced understanding. Andrés Aggarwal will continue current medications, diet and exercise. No orders of the defined types were placed in this encounter.          Completed Refills               Requested Prescriptions      No prescriptions requested or ordered in this encounter     4. Patient given educational materials - see patient instructions    5. Was a self-tracking handout given in paper form or via [employer]? NO    No orders of the defined types were placed in this encounter. Return in about 4 months (around 8/14/2022). Patient voiced understanding and agreed to treatment plan. Electronically signed by Brian Gregg MD on 4/14/2022 at 1:28 PM    This note is created with a voice recognition program and while intend to generate a document that accurately reflects the content of the visit, no guarantee can be provided that every mistake has been identified and corrected by editing.

## 2022-04-22 RX ORDER — ATENOLOL 50 MG/1
TABLET ORAL
Qty: 180 TABLET | Refills: 1 | Status: SHIPPED | OUTPATIENT
Start: 2022-04-22

## 2022-04-22 NOTE — TELEPHONE ENCOUNTER
Abraham Claire is calling to request a refill on the following medication(s):    Medication Request:  Requested Prescriptions     Pending Prescriptions Disp Refills    atenolol (TENORMIN) 50 MG tablet [Pharmacy Med Name: Atenolol Oral Tablet 50 MG] 180 tablet 0     Sig: TAKE ONE TABLET BY MOUTH TWICE DAILY     Last Fill: 8/23/21  Last Visit Date (If Applicable):  0/83/9696    Next Visit Date:    8/19/2022

## 2022-05-04 ENCOUNTER — PATIENT MESSAGE (OUTPATIENT)
Dept: INTERNAL MEDICINE CLINIC | Age: 67
End: 2022-05-04

## 2022-05-05 RX ORDER — BENZONATATE 100 MG/1
100 CAPSULE ORAL 3 TIMES DAILY PRN
Qty: 30 CAPSULE | Refills: 0 | Status: SHIPPED | OUTPATIENT
Start: 2022-05-05 | End: 2022-05-12

## 2022-05-05 NOTE — TELEPHONE ENCOUNTER
From: Yoana An  To: Dr. Laquita Feng  Sent: 5/4/2022 7:37 PM EDT  Subject: Cough    Dr. Karla Melo  The meds u gave me on 4/22 help all day. ..but at night, i cant stop coughing. Completed 2 covid tests both negative. Is there something you csn give me to stop this cough? Going on 4 weeks.

## 2022-06-01 ENCOUNTER — HOSPITAL ENCOUNTER (OUTPATIENT)
Facility: CLINIC | Age: 67
Setting detail: SPECIMEN
Discharge: HOME | End: 2022-06-01
Payer: COMMERCIAL

## 2022-06-01 PROCEDURE — 83993 ASSAY FOR CALPROTECTIN FECAL: CPT

## 2022-06-01 PROCEDURE — 87324 CLOSTRIDIUM AG IA: CPT

## 2022-06-01 PROCEDURE — 87449 NOS EACH ORGANISM AG IA: CPT

## 2022-06-01 PROCEDURE — 87506 IADNA-DNA/RNA PROBE TQ 6-11: CPT

## 2022-06-01 PROCEDURE — 82705 FATS/LIPIDS FECES QUAL: CPT

## 2022-06-01 PROCEDURE — 83520 IMMUNOASSAY QUANT NOS NONAB: CPT

## 2022-06-02 LAB
C DIFF AG + TOXIN: NEGATIVE
CAMPYLOBACTER PCR: NORMAL
E COLI ENTEROTOXIGENIC PCR: NORMAL
PLESIOMONAS SHIGELLOIDES PCR: NORMAL
SALMONELLA PCR: NORMAL
SHIGATOXIN GENE PCR: NORMAL
SHIGELLA SP PCR: NORMAL
SPECIMEN DESCRIPTION: NORMAL
SPECIMEN DESCRIPTION: NORMAL
VIBRIO PCR: NORMAL
YERSINIA ENTEROCOLITICA PCR: NORMAL

## 2022-06-03 LAB
FAT QUALITATIVE SPLIT STOOL: NORMAL
FECAL NEUTRAL FAT: NORMAL

## 2022-06-04 LAB — CALPROTECTIN, FECAL: 165 UG/G

## 2022-06-05 LAB — FECAL PANCREATIC ELASTASE-1: 170 UG/G

## 2022-06-09 RX ORDER — LOSARTAN POTASSIUM 100 MG/1
TABLET ORAL
Qty: 90 TABLET | Refills: 0 | Status: SHIPPED | OUTPATIENT
Start: 2022-06-09 | End: 2022-09-07

## 2022-06-09 NOTE — TELEPHONE ENCOUNTER
Ruth Franco is calling to request a refill on the following medication(s):    Medication Request:    Last filled 3/11/22 #90 with 0 RF    Requested Prescriptions     Pending Prescriptions Disp Refills    losartan (COZAAR) 100 MG tablet [Pharmacy Med Name: Losartan Potassium Oral Tablet 100 MG] 90 tablet 0     Sig: TAKE ONE TABLET BY MOUTH ONE TIME DAILY       Last Visit Date (If Applicable):  6/64/1478    Next Visit Date:    8/19/2022

## 2022-06-14 ENCOUNTER — OFFICE VISIT (OUTPATIENT)
Dept: INTERNAL MEDICINE CLINIC | Age: 67
End: 2022-06-14
Payer: COMMERCIAL

## 2022-06-14 VITALS
BODY MASS INDEX: 35.61 KG/M2 | WEIGHT: 201 LBS | DIASTOLIC BLOOD PRESSURE: 90 MMHG | TEMPERATURE: 98 F | OXYGEN SATURATION: 99 % | HEART RATE: 80 BPM | SYSTOLIC BLOOD PRESSURE: 140 MMHG | RESPIRATION RATE: 20 BRPM | HEIGHT: 63 IN

## 2022-06-14 DIAGNOSIS — F32.A DEPRESSION, UNSPECIFIED DEPRESSION TYPE: ICD-10-CM

## 2022-06-14 DIAGNOSIS — I10 ESSENTIAL HYPERTENSION: ICD-10-CM

## 2022-06-14 DIAGNOSIS — R07.81 RIB PAIN ON RIGHT SIDE: Primary | ICD-10-CM

## 2022-06-14 PROCEDURE — G0444 DEPRESSION SCREEN ANNUAL: HCPCS | Performed by: INTERNAL MEDICINE

## 2022-06-14 PROCEDURE — 1123F ACP DISCUSS/DSCN MKR DOCD: CPT | Performed by: INTERNAL MEDICINE

## 2022-06-14 PROCEDURE — 99214 OFFICE O/P EST MOD 30 MIN: CPT | Performed by: INTERNAL MEDICINE

## 2022-06-14 RX ORDER — DICLOFENAC EPOLAMINE 0.01 G/1
1 SYSTEM TOPICAL 2 TIMES DAILY
Qty: 60 PATCH | Refills: 0 | Status: SHIPPED | OUTPATIENT
Start: 2022-06-14

## 2022-06-14 ASSESSMENT — PATIENT HEALTH QUESTIONNAIRE - PHQ9
SUM OF ALL RESPONSES TO PHQ QUESTIONS 1-9: 13
3. TROUBLE FALLING OR STAYING ASLEEP: 3
4. FEELING TIRED OR HAVING LITTLE ENERGY: 1
5. POOR APPETITE OR OVEREATING: 1
2. FEELING DOWN, DEPRESSED OR HOPELESS: 3
9. THOUGHTS THAT YOU WOULD BE BETTER OFF DEAD, OR OF HURTING YOURSELF: 1
10. IF YOU CHECKED OFF ANY PROBLEMS, HOW DIFFICULT HAVE THESE PROBLEMS MADE IT FOR YOU TO DO YOUR WORK, TAKE CARE OF THINGS AT HOME, OR GET ALONG WITH OTHER PEOPLE: 1
6. FEELING BAD ABOUT YOURSELF - OR THAT YOU ARE A FAILURE OR HAVE LET YOURSELF OR YOUR FAMILY DOWN: 1
SUM OF ALL RESPONSES TO PHQ QUESTIONS 1-9: 12
SUM OF ALL RESPONSES TO PHQ QUESTIONS 1-9: 13
1. LITTLE INTEREST OR PLEASURE IN DOING THINGS: 0
8. MOVING OR SPEAKING SO SLOWLY THAT OTHER PEOPLE COULD HAVE NOTICED. OR THE OPPOSITE, BEING SO FIGETY OR RESTLESS THAT YOU HAVE BEEN MOVING AROUND A LOT MORE THAN USUAL: 0
7. TROUBLE CONCENTRATING ON THINGS, SUCH AS READING THE NEWSPAPER OR WATCHING TELEVISION: 3
SUM OF ALL RESPONSES TO PHQ QUESTIONS 1-9: 13
SUM OF ALL RESPONSES TO PHQ9 QUESTIONS 1 & 2: 3

## 2022-06-14 ASSESSMENT — COLUMBIA-SUICIDE SEVERITY RATING SCALE - C-SSRS
2. HAVE YOU ACTUALLY HAD ANY THOUGHTS OF KILLING YOURSELF?: NO
1. WITHIN THE PAST MONTH, HAVE YOU WISHED YOU WERE DEAD OR WISHED YOU COULD GO TO SLEEP AND NOT WAKE UP?: YES
6. HAVE YOU EVER DONE ANYTHING, STARTED TO DO ANYTHING, OR PREPARED TO DO ANYTHING TO END YOUR LIFE?: NO

## 2022-06-14 NOTE — PROGRESS NOTES
Mayra Figueredo is a 73 y.o. female who presents for   Chief Complaint   Patient presents with    Pain     upper right side    Health Maintenance     pneumo, d foot eye, shingles, dexa, hiv, tdap    Depression     patient states     and follow up of chronic medical problems. Patient Active Problem List   Diagnosis    Tachycardia    HTN (hypertension)    Fibromyalgia    Chronic UTI    IBS (irritable bowel syndrome)    Obesity    Hypercholesterolemia    Chondromalacia patellae of right knee    Patellar contusion    Prediabetes    History of hysterectomy for benign disease    Vaginal atrophy    Major depressive disorder, single episode with anxious distress     HPI  Here for follow-up and complains of for depression getting worse sometimes and also complaining of right-sided rib pain    Current Outpatient Medications   Medication Sig Dispense Refill    diclofenac (FLECTOR) 1.3 % PTCH patch Place 1 patch onto the skin 2 times daily 60 patch 0    cariprazine hcl (VRAYLAR) 1.5 MG capsule Take 1 capsule by mouth daily for 14 days 14 capsule 0    losartan (COZAAR) 100 MG tablet TAKE ONE TABLET BY MOUTH ONE TIME DAILY 90 tablet 0    atenolol (TENORMIN) 50 MG tablet TAKE ONE TABLET BY MOUTH TWICE DAILY 180 tablet 1    escitalopram (LEXAPRO) 10 MG tablet TAKE ONE TABLET BY MOUTH ONE TIME DAILY 90 tablet 0    ibuprofen (ADVIL;MOTRIN) 800 MG tablet TAKE 1 TABLET EVERY 6 HOURS AS NEEDED FOR PAIN 60 tablet 3    rosuvastatin (CRESTOR) 10 MG tablet Take 10 mg by mouth daily      Estradiol (VAGIFEM) 10 MCG TABS vaginal tablet Place 1 tablet vaginally daily For 2 weeks then place one tablet twice a week. 30 tablet 5     No current facility-administered medications for this visit.        Allergies   Allergen Reactions    Contrast [Iodides] Hives     Pt had hives during an IVP    Bactrim [Sulfamethoxazole-Trimethoprim] Hives    Sulfa Antibiotics     Morphine Nausea And Vomiting       Past Medical History:   Diagnosis Date    Anxiety     Chronic UTI     Fibromyalgia     Hematuria due to chronic cystitis     HTN (hypertension)     IBS (irritable bowel syndrome)     Major depressive disorder, single episode with anxious distress 10/8/2019    Obesity     Prediabetes     Tachycardia     Thyroid disease     nodules, hurshimoto disease    Unspecified sleep apnea     does not use machine       Past Surgical History:   Procedure Laterality Date    ABDOMEN SURGERY      abdominoplasty 2002    BLEPHAROPLASTY      BREAST REDUCTION SURGERY      CARDIAC CATHETERIZATION      clearing blockage w/ medication 80%    CHOLECYSTECTOMY      COLONOSCOPY  2013    10 yrs    CYST REMOVAL Bilateral     feet    FOOT TENDON SURGERY Left 12/02/2015    HYSTERECTOMY, TOTAL ABDOMINAL (CERVIX REMOVED)      with BSO    TONSILLECTOMY AND ADENOIDECTOMY         Family History   Problem Relation Age of Onset    Heart Disease Mother    [facility] Parkinsonism Mother     Hypertension Mother     Cancer Sister         cervical/cervical 1970s    Hypertension Sister     Heart Disease Brother         18 stints    Hypertension Brother     Elevated Lipids Brother     Other Brother         stents    High Blood Pressure Other      ROS   Constitutional:  Negative for fatigue, loss of appetite and unexpected weight change   HEENT            : Negative for neck stiffness and pain, no congestion or sinus pressure   Eyes                : No visual disturbance or pain   Cardiovascular: No chest pain or palpitations or leg swelling   Respiratory      : Negative for cough, shortness of breath or wheezing   Gastrointestinal: Negative for abdominal pain, constipation or diarrhea and bloating No nausea or vomiting   Genitourinary:     No urgency or frequency, no burning or hematuria   Musculoskeletal: No arthralgias, back pain or myalgias   Skin                  : Negative for rash or erythema   Neurological    : Negative for dizziness, weakness, tremors ,light headedness or syncope   Psychiatric       : Negative for dysphoric mood, sleep disturbances, nervous or anxious, or decreased concentration   All other review of systems was negative    Objective  Physical Examination:    Nursing note reviewed    BP (!) 140/90 (Site: Right Upper Arm, Position: Sitting, Cuff Size: Medium Adult)   Pulse 80   Temp 98 °F (36.7 °C) (Temporal)   Resp 20   Ht 5' 2.52\" (1.588 m)   Wt 201 lb (91.2 kg)   SpO2 99%   Breastfeeding No   BMI 36.15 kg/m²   BP Readings from Last 3 Encounters:   06/14/22 (!) 140/90   04/14/22 122/70   02/07/22 138/84         Constitutional:  Natalia Baer is oriented to place, person and time ,appears well-developed and well-nourished  HEENT:  Atraumatic and normocephalic, external ears normal bilaterally, nose normal no oropharyngeal exudate and is clear and moist  Eyes:  EOCM normal; conjunctivae normal; PERRLA bilaterally  Neck:  Normal range of motion, neck supple, no JVD and no thyromegaly  Cardiovascular:  RRR, normal heart sounds and intact distal pulses  Pulmonary:  effort normal and breath sounds normal bilaterally,no wheezes or rales, no respiratory distress  Abdominal:  Soft, non-tender; normal bowel sounds, no masses  Musculoskeletal:  Normal range of motion and no edema or tenderness bilaterally  No lymphadenopathy  Neurological:  alert, oriented, and normal reflexes bilaterally  Skin: warm and dry  Psychiatric:  normal mood and effect; behavior normal.    Labs:   Lab Results   Component Value Date    LABA1C 5.9 02/07/2022     Lab Results   Component Value Date    CHOL 196 06/01/2021     Lab Results   Component Value Date    HDL 45 02/07/2022     No results found for: [address]  Lab Results   Component Value Date    TRIG 94 06/01/2021     No results found for: CHOLHDL  Lab Results   Component Value Date    WBC 8.2 06/01/2021    HGB 12.7 06/01/2021    HCT 41.9 06/01/2021    MCV 90.1 06/01/2021     06/01/2021     No results found for: INR, PROTIME  Lab Results   Component Value Date    GLUCOSE 87 02/07/2022    CREATININE 1.07 (H) 02/07/2022    BUN 24 (H) 02/07/2022     02/07/2022    K 4.6 02/07/2022     02/07/2022    CO2 26 02/07/2022     Lab Results   Component Value Date    ALT 17 02/07/2022    AST 18 02/07/2022    ALKPHOS 67 02/07/2022    BILITOT 0.57 02/07/2022     Lab Results   Component Value Date    LABPROT 7.5 11/28/2012    LABALBU 4.6 02/07/2022     Lab Results   Component Value Date    TSH 1.05 02/07/2022     Assessment:  1. Rib pain on right side    2. Depression, unspecified depression type    3. Essential hypertension        Plan:  Patient still grieving her sister passed away few years back and patient is on escitalopram and did go through counseling but not at this time and patient advised to continue escitalopram and also start on Vraylar 1.5 mg daily for additional effect for the depression and also discussed about seeing the psychiatrist or psychologist but patient not interested at this time and will call me back if she feels like seeing one  Patient complaining of rib and muscle pain on the right side has been going on for the last 6 to 8 months and had injection into her thoracic spine by the pain management last week but did not help her much and so advised patient to try Flector patches to help with the pain and the prescription given  Blood pressure is slightly elevated and advised to monitor  Review in 4 months       1. Kodak received counseling on the following healthy behaviors: nutrition and exercise    2. Prior labs and health maintenance reviewed. 3.  Discussed use, benefit, and side effects of prescribed medications. Barriers to medication compliance addressed. All her questions were answered. Pt voiced understanding. Shruthi Lugo will continue current medications, diet and exercise.               Orders Placed This Encounter   Medications    diclofenac (FLECTOR) 1.3 % PTCH patch     Sig: Place 1 patch onto the skin 2 times daily     Dispense:  60 patch     Refill:  0    cariprazine hcl (VRAYLAR) 1.5 MG capsule     Sig: Take 1 capsule by mouth daily for 14 days     Dispense:  14 capsule     Refill:  0     Lot [id number]  Exp 12/2023          Completed Refills               Requested Prescriptions     Signed Prescriptions Disp Refills    diclofenac (FLECTOR) 1.3 % PTCH patch 60 patch 0     Sig: Place 1 patch onto the skin 2 times daily    cariprazine hcl (VRAYLAR) 1.5 MG capsule 14 capsule 0     Sig: Take 1 capsule by mouth daily for 14 days     4. Patient given educational materials - see patient instructions    5. Was a self-tracking handout given in paper form or via [employer]? NO    No orders of the defined types were placed in this encounter. No follow-ups on file. Patient voiced understanding and agreed to treatment plan. Electronically signed by Brian Gregg MD on 6/14/2022 at 2:31 PM    This note is created with a voice recognition program and while intend to generate a document that accurately reflects the content of the visit, no guarantee can be provided that every mistake has been identified and corrected by editing.

## 2022-06-15 ENCOUNTER — PATIENT MESSAGE (OUTPATIENT)
Dept: INTERNAL MEDICINE CLINIC | Age: 67
End: 2022-06-15

## 2022-06-15 DIAGNOSIS — R07.81 RIB PAIN ON RIGHT SIDE: Primary | ICD-10-CM

## 2022-06-15 NOTE — TELEPHONE ENCOUNTER
Spoke with Amarilis Mandujano at [facility] [employer] are not done over the phone   Ins is bharat HELLER form    Patient informed stated she is ok for now

## 2022-06-15 NOTE — TELEPHONE ENCOUNTER
From: Estuardo Russ  To: Dr. Kristie Marquez  Sent: 6/15/2022 2:57 PM EDT  Subject: Pain Patches    Dr. Lisa Sanchez,  The patches you prescribed for me yesterday during my visit. The pharmacy called me and stated you need to call the insurance company for approval.  I am using ibprophen now, i can not take the pain. Any other suggestions?

## 2022-06-20 ENCOUNTER — PATIENT MESSAGE (OUTPATIENT)
Dept: INTERNAL MEDICINE CLINIC | Age: 67
End: 2022-06-20

## 2022-06-20 DIAGNOSIS — F32.9 MAJOR DEPRESSIVE DISORDER, SINGLE EPISODE WITH ANXIOUS DISTRESS: Primary | ICD-10-CM

## 2022-06-20 RX ORDER — BUPROPION HYDROCHLORIDE 75 MG/1
75 TABLET ORAL 2 TIMES DAILY
Qty: 60 TABLET | Refills: 0 | Status: SHIPPED | OUTPATIENT
Start: 2022-06-20 | End: 2022-07-19

## 2022-06-20 NOTE — TELEPHONE ENCOUNTER
She can stop the Vraylar  Continue Lexapro  Add Wellbutrin 75 mg twice a day #60  Patient to call me back next week see how she is doing

## 2022-06-20 NOTE — TELEPHONE ENCOUNTER
From: Willie Sierra  To: Dr. Konrad Byrne  Sent: 6/20/2022 10:50 AM EDT  Subject: Ginette Garza meds-Depression    Dr. Alison Simmons,  I know its only been one whole week with the new meds, but i am experiencing muscle cramps in my legs, and feeling like i am totally groggy. I have one more week to go. I am not certain this drug is for me however i will give it the 14 days you mentioned to complete. Back pain is still here. If it is a muscle as you stated, i am sure it will take some time. How long should i give it. Reginaldo Guallpa upon moving its above a 10.

## 2022-06-21 ENCOUNTER — PATIENT MESSAGE (OUTPATIENT)
Dept: INTERNAL MEDICINE CLINIC | Age: 67
End: 2022-06-21

## 2022-06-22 ENCOUNTER — PATIENT MESSAGE (OUTPATIENT)
Dept: INTERNAL MEDICINE CLINIC | Age: 67
End: 2022-06-22

## 2022-06-22 NOTE — TELEPHONE ENCOUNTER
Pt states she got the cream and started it on Monday. Pt has used the cream on her side and back. Pt used it three times yesterday and so far just once today. Pt states she took a flexeril yesterday and that helped a little bit. Pt wants to know if she should wait a little bit longer to see if the cream starts to help? Please advise.

## 2022-06-22 NOTE — TELEPHONE ENCOUNTER
From: Jael Gill  To: Dr. Goncalves : 6/22/2022 11:27 AM EDT  Subject: Back pain    I an going back to Dr. Nir Gar at 130.

## 2022-06-22 NOTE — TELEPHONE ENCOUNTER
Continue Flexeril and the cream and call back tomorrow if any better  Other option which I suggest is to go to ER to get evaluated

## 2022-06-22 NOTE — TELEPHONE ENCOUNTER
From: Robyn Bowens  To: Dr. Amaya Chacko  Sent: 6/21/2022 4:53 PM EDT  Subject: back pain    Dr. Rosas Gains,  I am so glad you like me:) I am dying in back pain. I have been using the cream you ordered and now the pain is in my middle back. like a knife. Rosemary Desouza i know i have had mri and cat scan but how do i know its not an organ or something. . ( do not laugh)  I have had several injections but they have never lasted long.

## 2022-06-26 ENCOUNTER — PATIENT MESSAGE (OUTPATIENT)
Dept: INTERNAL MEDICINE CLINIC | Age: 67
End: 2022-06-26

## 2022-06-27 NOTE — TELEPHONE ENCOUNTER
From: Solange Barr  To: Dr. Garlon Schaumann: 6/26/2022 8:04 PM EDT  Subject: Azra Carballo     I started this drug last tues. Today, sunday,  I am super anxious. I read the side effects to the drug. ..will this feeling continue? Should i stop it?

## 2022-07-01 ENCOUNTER — PATIENT MESSAGE (OUTPATIENT)
Dept: INTERNAL MEDICINE CLINIC | Age: 67
End: 2022-07-01

## 2022-07-01 RX ORDER — ESCITALOPRAM OXALATE 10 MG/1
TABLET ORAL
Qty: 90 TABLET | Refills: 0 | Status: SHIPPED | OUTPATIENT
Start: 2022-07-01 | End: 2022-09-26

## 2022-07-01 NOTE — TELEPHONE ENCOUNTER
From: Alexandra Mendoza  To: Dr. Krista Edmonds: 7/1/2022 8:31 AM EDT  Subject: Lexapro    Can you call in my Aireumapro today. I am out of it. If able can you make the script for a 60 or 90 day refill?   Thank you

## 2022-07-17 ENCOUNTER — PATIENT MESSAGE (OUTPATIENT)
Dept: INTERNAL MEDICINE CLINIC | Age: 67
End: 2022-07-17

## 2022-07-17 DIAGNOSIS — F32.9 MAJOR DEPRESSIVE DISORDER, SINGLE EPISODE WITH ANXIOUS DISTRESS: ICD-10-CM

## 2022-07-18 NOTE — TELEPHONE ENCOUNTER
Padmini Medina is calling to request a refill on the following medication(s):    Medication Request:  Requested Prescriptions     Pending Prescriptions Disp Refills    buPROPion (WELLBUTRIN) 75 MG tablet [Pharmacy Med Name: buPROPion HCl Oral Tablet 75 MG] 60 tablet 0     Sig: TAKE ONE TABLET BY MOUTH TWICE DAILY     Last filled 6/20/22 30 day no refill    Last Visit Date (If Applicable):  0/65/8319    Next Visit Date:    8/19/2022

## 2022-07-19 RX ORDER — BUPROPION HYDROCHLORIDE 75 MG/1
TABLET ORAL
Qty: 60 TABLET | Refills: 0 | Status: SHIPPED | OUTPATIENT
Start: 2022-07-19 | End: 2022-08-17

## 2022-07-19 NOTE — TELEPHONE ENCOUNTER
From: Tori Caba  To: Dr. Magnolia Urrutia  Sent: 7/17/2022 9:45 PM EDT  Subject: Matthew Hopes     I remain on wellbrutin but i need a refill called into [facility].    Thank guero

## 2022-07-25 ENCOUNTER — PATIENT MESSAGE (OUTPATIENT)
Dept: INTERNAL MEDICINE CLINIC | Age: 67
End: 2022-07-25

## 2022-07-25 DIAGNOSIS — G47.30 SLEEP APNEA, UNSPECIFIED TYPE: Primary | ICD-10-CM

## 2022-07-25 NOTE — TELEPHONE ENCOUNTER
Patient called and stated that she would like to see Dr. Gila Navarrete as she had seen him in the past for her sleep apnea. Pt is asking for a referral to be faxed to [facility] office YWJ#[phone].  Thank you

## 2022-08-11 ENCOUNTER — PATIENT MESSAGE (OUTPATIENT)
Dept: INTERNAL MEDICINE CLINIC | Age: 67
End: 2022-08-11

## 2022-08-11 DIAGNOSIS — N39.0 URINARY TRACT INFECTION WITHOUT HEMATURIA, SITE UNSPECIFIED: Primary | ICD-10-CM

## 2022-08-11 NOTE — TELEPHONE ENCOUNTER
From: Uri Sayjama  To: Dr. Simona Meigs: 8/11/2022 10:02 AM EDT  Subject: Possible UTI    Doctor K,  Can you send a order to the Lab in your [facility] for a urine test?  I am getting symptoms of one, but i am not certain. Thank you.

## 2022-08-12 ENCOUNTER — TELEPHONE (OUTPATIENT)
Dept: INTERNAL MEDICINE CLINIC | Age: 67
End: 2022-08-12

## 2022-08-15 ENCOUNTER — HOSPITAL ENCOUNTER (OUTPATIENT)
Facility: CLINIC | Age: 67
Discharge: HOME | End: 2022-08-15
Payer: COMMERCIAL

## 2022-08-15 DIAGNOSIS — N39.0 URINARY TRACT INFECTION WITHOUT HEMATURIA, SITE UNSPECIFIED: ICD-10-CM

## 2022-08-15 LAB
BILIRUBIN URINE: NEGATIVE
CASTS UA: NORMAL /LPF (ref 0–8)
COLOR: YELLOW
EPITHELIAL CELLS UA: NORMAL /HPF (ref 0–5)
GLUCOSE URINE: NEGATIVE
KETONES, URINE: NEGATIVE
LEUKOCYTE ESTERASE, URINE: NEGATIVE
NITRITE, URINE: NEGATIVE
PH UA: 5.5 (ref 5–8)
PROTEIN UA: ABNORMAL
RBC UA: NORMAL /HPF (ref 0–4)
SPECIFIC GRAVITY UA: 1.02 (ref 1–1.03)
TURBIDITY: CLEAR
URINE HGB: ABNORMAL
UROBILINOGEN, URINE: NORMAL
WBC UA: NORMAL /HPF (ref 0–5)

## 2022-08-15 PROCEDURE — 81001 URINALYSIS AUTO W/SCOPE: CPT

## 2022-08-16 DIAGNOSIS — F32.9 MAJOR DEPRESSIVE DISORDER, SINGLE EPISODE WITH ANXIOUS DISTRESS: ICD-10-CM

## 2022-08-17 RX ORDER — BUPROPION HYDROCHLORIDE 75 MG/1
TABLET ORAL
Qty: 60 TABLET | Refills: 0 | Status: SHIPPED | OUTPATIENT
Start: 2022-08-17 | End: 2022-09-16

## 2022-08-17 NOTE — TELEPHONE ENCOUNTER
Last Visit:  6/14/2022     Next Visit Date:  Future Appointments   Date Time Provider Kierra Garza   9/19/2022  3:45 PM Fatoumata Sigala MD [id number]   10/19/2022  1:30 PM Mary Mckenna MD [facility] [address] Maintenance   Topic Date Due    Pneumococcal 65+ years Vaccine (1 - PCV) Never done    Diabetic foot exam  Never done    DTaP/Tdap/Td vaccine (1 - Tdap) Never done    Shingles vaccine (1 of 2) Never done    DEXA (modify frequency per FRAX score)  Never done    Diabetic retinal exam  01/21/2020    COVID-19 Vaccine (4 - Booster for Moderna series) 04/03/2022    Flu vaccine (1) 09/01/2022    A1C test (Diabetic or Prediabetic)  02/07/2023    Diabetic microalbuminuria test  02/07/2023    Lipids  02/07/2023    Depression Monitoring  06/14/2023    Breast cancer screen  01/15/2024    Colorectal Cancer Screen  06/29/2027    Hepatitis C screen  Completed    Hepatitis A vaccine  Aged Out    Hib vaccine  Aged Out    Meningococcal (ACWY) vaccine  Aged Out       Hemoglobin A1C (%)   Date Value   02/07/2022 5.9   06/01/2021 5.5   12/28/2020 5.8             ( goal A1C is < 7)   Microalb/Crt.  Ratio (mcg/mg creat)   Date Value   02/07/2022 520 (H)     LDL Cholesterol (mg/dL)   Date Value   02/07/2022 68   06/01/2021 131 (H)       (goal LDL is <100)   AST (U/L)   Date Value   02/07/2022 18     ALT (U/L)   Date Value   02/07/2022 17     BUN (mg/dL)   Date Value   02/07/2022 24 (H)     BP Readings from Last 3 Encounters:   06/14/22 (!) 140/90   04/14/22 122/70   02/07/22 138/84          (goal 120/80)    All Future Testing planned in [facility]  Lab Frequency Next Occurrence               Patient Active Problem List:     Tachycardia     HTN (hypertension)     Fibromyalgia     Chronic UTI     IBS (irritable bowel syndrome)     Obesity     Hypercholesterolemia     Chondromalacia patellae of right knee     Patellar contusion     Prediabetes     History of hysterectomy for benign disease     Vaginal atrophy     Major depressive disorder, single episode with anxious distress

## 2022-09-07 RX ORDER — LOSARTAN POTASSIUM 100 MG/1
TABLET ORAL
Qty: 90 TABLET | Refills: 0 | Status: SHIPPED | OUTPATIENT
Start: 2022-09-07 | End: 2022-09-08

## 2022-09-07 NOTE — TELEPHONE ENCOUNTER
Kendal Johnson is calling to request a refill on the following medication(s):    Last Visit Date (If Applicable):  7/42/4139    Next Visit Date:    9/29/2022    Medication Request:  Requested Prescriptions     Pending Prescriptions Disp Refills    losartan (COZAAR) 100 MG tablet [Pharmacy Med Name: Losartan Potassium Oral Tablet 100 MG] 90 tablet 0     Sig: TAKE ONE TABLET BY MOUTH ONE TIME DAILY

## 2022-09-08 RX ORDER — LOSARTAN POTASSIUM 100 MG/1
TABLET ORAL
Qty: 90 TABLET | Refills: 0 | Status: SHIPPED | OUTPATIENT
Start: 2022-09-08

## 2022-09-08 NOTE — TELEPHONE ENCOUNTER
Sherryle Duffel is calling to request a refill on the following medication(s):    Last Visit Date (If Applicable):  5/98/9748    Next Visit Date:    9/29/2022    Medication Request:  Requested Prescriptions     Pending Prescriptions Disp Refills    losartan (COZAAR) 100 MG tablet [Pharmacy Med Name: Losartan Potassium Oral Tablet 100 MG] 90 tablet 0     Sig: TAKE ONE TABLET BY MOUTH ONE TIME DAILY

## 2022-09-09 RX ORDER — LOSARTAN POTASSIUM 100 MG/1
TABLET ORAL
Qty: 90 TABLET | Refills: 0 | OUTPATIENT
Start: 2022-09-09

## 2022-09-13 DIAGNOSIS — F32.9 MAJOR DEPRESSIVE DISORDER, SINGLE EPISODE WITH ANXIOUS DISTRESS: ICD-10-CM

## 2022-09-15 NOTE — TELEPHONE ENCOUNTER
Geno Roldan is calling to request a refill on the following medication(s):    Medication Request:  Requested Prescriptions     Pending Prescriptions Disp Refills    buPROPion (WELLBUTRIN) 75 MG tablet [Pharmacy Med Name: buPROPion HCl Oral Tablet 75 MG] 60 tablet 0     Sig: TAKE ONE TABLET BY MOUTH TWICE DAILY     Last fill 8/17/22  Last Visit Date (If Applicable):  0/27/2455    Next Visit Date:    9/29/2022

## 2022-09-16 RX ORDER — BUPROPION HYDROCHLORIDE 75 MG/1
TABLET ORAL
Qty: 60 TABLET | Refills: 0 | Status: SHIPPED | OUTPATIENT
Start: 2022-09-16

## 2022-09-26 RX ORDER — ESCITALOPRAM OXALATE 10 MG/1
TABLET ORAL
Qty: 90 TABLET | Refills: 0 | Status: SHIPPED | OUTPATIENT
Start: 2022-09-26

## 2022-09-29 ENCOUNTER — OFFICE VISIT (OUTPATIENT)
Dept: INTERNAL MEDICINE CLINIC | Age: 67
End: 2022-09-29
Payer: COMMERCIAL

## 2022-09-29 VITALS
DIASTOLIC BLOOD PRESSURE: 78 MMHG | HEART RATE: 76 BPM | RESPIRATION RATE: 16 BRPM | OXYGEN SATURATION: 98 % | TEMPERATURE: 97.4 F | HEIGHT: 63 IN | BODY MASS INDEX: 37.67 KG/M2 | WEIGHT: 212.6 LBS | SYSTOLIC BLOOD PRESSURE: 140 MMHG

## 2022-09-29 DIAGNOSIS — I10 ESSENTIAL HYPERTENSION: Primary | ICD-10-CM

## 2022-09-29 DIAGNOSIS — R06.2 WHEEZING: ICD-10-CM

## 2022-09-29 DIAGNOSIS — E11.9 TYPE 2 DIABETES MELLITUS WITHOUT COMPLICATION, WITHOUT LONG-TERM CURRENT USE OF INSULIN (HCC): ICD-10-CM

## 2022-09-29 DIAGNOSIS — T17.308A CHOKING, INITIAL ENCOUNTER: ICD-10-CM

## 2022-09-29 DIAGNOSIS — F32.9 MAJOR DEPRESSIVE DISORDER, SINGLE EPISODE WITH ANXIOUS DISTRESS: ICD-10-CM

## 2022-09-29 PROCEDURE — 1123F ACP DISCUSS/DSCN MKR DOCD: CPT | Performed by: INTERNAL MEDICINE

## 2022-09-29 PROCEDURE — 3044F HG A1C LEVEL LT 7.0%: CPT | Performed by: INTERNAL MEDICINE

## 2022-09-29 PROCEDURE — 99214 OFFICE O/P EST MOD 30 MIN: CPT | Performed by: INTERNAL MEDICINE

## 2022-09-29 RX ORDER — MONTELUKAST SODIUM 10 MG/1
10 TABLET ORAL DAILY
Qty: 30 TABLET | Refills: 0 | Status: SHIPPED | OUTPATIENT
Start: 2022-09-29

## 2022-09-29 NOTE — PROGRESS NOTES
Aj Jones is a 78 y.o. female who presents for   Chief Complaint   Patient presents with    Hypertension     Patient here for routine follow, getting endoscopy tomorrow with Dr. Gera Arboleda, experience some wheezing not sure why    and follow up of chronic medical problems. Patient Active Problem List   Diagnosis    Tachycardia    HTN (hypertension)    Fibromyalgia    Chronic UTI    IBS (irritable bowel syndrome)    Obesity    Hypercholesterolemia    Chondromalacia patellae of right knee    Patellar contusion    Prediabetes    History of hysterectomy for benign disease    Vaginal atrophy    Major depressive disorder, single episode with anxious distress     HPI  Here for follow-up on blood pressure denies any new complaints other than occasional wheezing    Current Outpatient Medications   Medication Sig Dispense Refill    montelukast (SINGULAIR) 10 MG tablet Take 1 tablet by mouth daily 30 tablet 0    escitalopram (LEXAPRO) 10 MG tablet TAKE ONE TABLET BY MOUTH ONE TIME DAILY 90 tablet 0    buPROPion (WELLBUTRIN) 75 MG tablet TAKE ONE TABLET BY MOUTH TWICE DAILY (Patient taking differently: Take 1 mg by mouth daily (with breakfast)) 60 tablet 0    losartan (COZAAR) 100 MG tablet TAKE ONE TABLET BY MOUTH ONE TIME DAILY 90 tablet 0    diclofenac sodium (VOLTAREN) 1 % GEL Apply 2 g topically 4 times daily 120 g 0    diclofenac sodium (VOLTAREN) 1 % GEL Apply 4 g topically 4 times daily 150 g 0    diclofenac (FLECTOR) 1.3 % PTCH patch Place 1 patch onto the skin 2 times daily 60 patch 0    atenolol (TENORMIN) 50 MG tablet TAKE ONE TABLET BY MOUTH TWICE DAILY 180 tablet 1    ibuprofen (ADVIL;MOTRIN) 800 MG tablet TAKE 1 TABLET EVERY 6 HOURS AS NEEDED FOR PAIN 60 tablet 3    rosuvastatin (CRESTOR) 10 MG tablet Take 10 mg by mouth daily      Estradiol (VAGIFEM) 10 MCG TABS vaginal tablet Place 1 tablet vaginally daily For 2 weeks then place one tablet twice a week.  30 tablet 5     No current facility-administered medications for this visit.        Allergies   Allergen Reactions    Contrast [Iodides] Hives     Pt had hives during an IVP    Bactrim [Sulfamethoxazole-Trimethoprim] Hives    Sulfa Antibiotics     Morphine Nausea And Vomiting       Past Medical History:   Diagnosis Date    Anxiety     Chronic UTI     Fibromyalgia     Hematuria due to chronic cystitis     HTN (hypertension)     IBS (irritable bowel syndrome)     Major depressive disorder, single episode with anxious distress 10/8/2019    Obesity     Prediabetes     Tachycardia     Thyroid disease     nodules, hurshimoto disease    Unspecified sleep apnea     does not use machine       Past Surgical History:   Procedure Laterality Date    ABDOMEN SURGERY      abdominoplasty 2002    BLEPHAROPLASTY      BREAST REDUCTION SURGERY      CARDIAC CATHETERIZATION      clearing blockage w/ medication 80%    CHOLECYSTECTOMY      COLONOSCOPY  2013    10 yrs    CYST REMOVAL Bilateral     feet    FOOT TENDON SURGERY Left 12/02/2015    HYSTERECTOMY, TOTAL ABDOMINAL (CERVIX REMOVED)      with BSO    TONSILLECTOMY AND ADENOIDECTOMY         Family History   Problem Relation Age of Onset    Heart Disease Mother     Parkinsonism Mother     Hypertension Mother     Cancer Sister         cervical/cervical 1970s    Hypertension Sister     Heart Disease Brother         25 stints    Hypertension Brother     Elevated Lipids Brother     Other Brother         stents    High Blood Pressure Other      ROS  Constitutional:  Negative for fatigue, loss of appetite and unexpected weight change  HEENT            : Negative for neck stiffness and pain, no congestion or sinus pressure  Eyes                : No visual disturbance or pain  Cardiovascular: No chest pain or palpitations or leg swelling  Respiratory      : Negative for cough, shortness of breath but positive for wheezing when coughs or sneezes  Gastrointestinal: Negative for abdominal pain, constipation or diarrhea and bloating No nausea or vomiting  Genitourinary:     No urgency or frequency, no burning or hematuria  Musculoskeletal: No arthralgias, back pain or myalgias  Skin                  : Negative for rash or erythema  Neurological    : Negative for dizziness, weakness, tremors ,light headedness or syncope  Psychiatric       : Negative for dysphoric mood, sleep disturbances, nervous or anxious, or decreased concentration  All other review of systems was negative    Objective  Physical Examination:    Nursing note reviewed    BP (!) 140/78 (Site: Right Upper Arm, Position: Sitting, Cuff Size: Medium Adult)   Pulse 76   Temp 97.4 °F (36.3 °C) (Cerebral)   Resp 16   Ht 5' 3\" (1.6 m)   Wt 212 lb 9.6 oz (96.4 kg)   SpO2 98%   BMI 37.66 kg/m²   BP Readings from Last 3 Encounters:   09/29/22 (!) 140/78   06/14/22 (!) 140/90   04/14/22 122/70         Constitutional:  Navin Joya is oriented to place, person and time ,appears well-developed and well-nourished  HEENT:  Atraumatic and normocephalic, external ears normal bilaterally, nose normal no oropharyngeal exudate and is clear and moist  Eyes:  EOCM normal; conjunctivae normal; PERRLA bilaterally  Neck:  Normal range of motion, neck supple, no JVD and no thyromegaly  Cardiovascular:  RRR, normal heart sounds and intact distal pulses  Pulmonary:  effort normal and breath sounds normal bilaterally,no wheezes or rales, no respiratory distress  Abdominal:  Soft, non-tender; normal bowel sounds, no masses  Musculoskeletal:  Normal range of motion and no edema or tenderness bilaterally  No lymphadenopathy  Neurological:  alert, oriented, and normal reflexes bilaterally  Skin: warm and dry  Psychiatric:  normal mood and effect; behavior normal.    Labs:   Lab Results   Component Value Date    LABA1C 5.9 02/07/2022     Lab Results   Component Value Date    CHOL 196 06/01/2021     Lab Results   Component Value Date    HDL 45 02/07/2022     No results found for: [facility]  Lab Results   Component Value Date TRIG 94 06/01/2021     No results found for: [place], [place]  Lab Results   Component Value Date    WBC 8.2 06/01/2021    HGB 12.7 06/01/2021    HCT 41.9 06/01/2021    MCV 90.1 06/01/2021     06/01/2021     No results found for: INR, PROTIME  Lab Results   Component Value Date    GLUCOSE 87 02/07/2022    CREATININE 1.07 (H) 02/07/2022    BUN 24 (H) 02/07/2022     02/07/2022    K 4.6 02/07/2022     02/07/2022    CO2 26 02/07/2022     Lab Results   Component Value Date    ALT 17 02/07/2022    AST 18 02/07/2022    ALKPHOS 67 02/07/2022    BILITOT 0.57 02/07/2022     Lab Results   Component Value Date    LABPROT 7.5 11/28/2012    LABALBU 4.6 02/07/2022     Lab Results   Component Value Date    TSH 1.05 02/07/2022     Assessment:  1. Essential hypertension    2. Major depressive disorder, single episode with anxious distress    3. Wheezing    4. Type 2 diabetes mellitus without complication, without long-term current use of insulin (HCC)    5. Choking, initial encounter        Plan:  Patient blood pressure is stable on current medication continue losartan  Patient's depression is stable and continue escitalopram as before and bupropion  Patient is getting an EGD done for choking sensation tomorrow and patient is taking omeprazole 40 mg twice a day  Patient is following with cardiology next month and advised patient to get lab work done including call [facility] and last cholesterol 196 and HDL 45  Last hemoglobin A1c was 5.8 and patient not on any medications and will repeat lab work  Patient mentioned that she gets occasional wheezing but currently patient sounds clear and says happens only when she is coughing or sneezing and I advised patient to take a Singulair 10 mg daily and new prescription sent to the pharmacy  Review in 4 months           1. [employer] received counseling on the following healthy behaviors: nutrition and exercise    2. Prior labs and health maintenance reviewed.      3.  Discussed use, and while intend to generate a document that accurately reflects the content of the visit, no guarantee can be provided that every mistake has been identified and corrected by editing.

## 2022-10-23 ENCOUNTER — PATIENT MESSAGE (OUTPATIENT)
Dept: INTERNAL MEDICINE CLINIC | Age: 67
End: 2022-10-23

## 2022-10-24 NOTE — TELEPHONE ENCOUNTER
From: Salvador Roa  To: Dr. Cesia Olsen: 10/23/2022 9:37 AM EDT  Subject: Cough    Dr. Mandy Sánchez   I have been using that constanza med. Concepcion emmanuel for cough. .its been helping. Can i take a mucinex with that. Got nasal congestion off and on but nothing severe.  Its just this cough!!

## 2022-11-01 ENCOUNTER — HOSPITAL ENCOUNTER (OUTPATIENT)
Facility: CLINIC | Age: 67
Discharge: HOME | End: 2022-11-01
Payer: COMMERCIAL

## 2022-11-01 DIAGNOSIS — F32.9 MAJOR DEPRESSIVE DISORDER, SINGLE EPISODE WITH ANXIOUS DISTRESS: ICD-10-CM

## 2022-11-01 DIAGNOSIS — E11.9 TYPE 2 DIABETES MELLITUS WITHOUT COMPLICATION, WITHOUT LONG-TERM CURRENT USE OF INSULIN (HCC): ICD-10-CM

## 2022-11-01 DIAGNOSIS — I10 ESSENTIAL HYPERTENSION: ICD-10-CM

## 2022-11-01 LAB
ALBUMIN SERPL-MCNC: 4.4 G/DL (ref 3.5–5.2)
ALBUMIN/GLOBULIN RATIO: 1.7 (ref 1–2.5)
ALP BLD-CCNC: 56 U/L (ref 35–104)
ALT SERPL-CCNC: 13 U/L (ref 5–33)
ANION GAP SERPL CALCULATED.3IONS-SCNC: 11 MMOL/L (ref 9–17)
AST SERPL-CCNC: 16 U/L
BILIRUB SERPL-MCNC: 0.5 MG/DL (ref 0.3–1.2)
BUN BLDV-MCNC: 36 MG/DL (ref 8–23)
CALCIUM SERPL-MCNC: 9.3 MG/DL (ref 8.6–10.4)
CHLORIDE BLD-SCNC: 102 MMOL/L (ref 98–107)
CHOLESTEROL/HDL RATIO: 2.6
CHOLESTEROL: 139 MG/DL
CO2: 24 MMOL/L (ref 20–31)
CREAT SERPL-MCNC: 1.21 MG/DL (ref 0.5–0.9)
GFR SERPL CREATININE-BSD FRML MDRD: 49 ML/MIN/1.73M2
GLUCOSE BLD-MCNC: 88 MG/DL (ref 70–99)
HCT VFR BLD CALC: 41.9 % (ref 36.3–47.1)
HDLC SERPL-MCNC: 53 MG/DL
HEMOGLOBIN: 13.1 G/DL (ref 11.9–15.1)
LDL CHOLESTEROL: 73 MG/DL (ref 0–130)
MAGNESIUM: 2.1 MG/DL (ref 1.6–2.6)
MCH RBC QN AUTO: 27.7 PG (ref 25.2–33.5)
MCHC RBC AUTO-ENTMCNC: 31.3 G/DL (ref 28.4–34.8)
MCV RBC AUTO: 88.6 FL (ref 82.6–102.9)
NRBC AUTOMATED: 0 PER 100 WBC
PDW BLD-RTO: 13.5 % (ref 11.8–14.4)
PLATELET # BLD: 221 K/UL (ref 138–453)
PMV BLD AUTO: 9.9 FL (ref 8.1–13.5)
POTASSIUM SERPL-SCNC: 4.6 MMOL/L (ref 3.7–5.3)
RBC # BLD: 4.73 M/UL (ref 3.95–5.11)
SODIUM BLD-SCNC: 137 MMOL/L (ref 135–144)
THYROXINE, FREE: 1.41 NG/DL (ref 0.93–1.7)
TOTAL CK: 37 U/L (ref 26–192)
TOTAL PROTEIN: 7 G/DL (ref 6.4–8.3)
TRIGL SERPL-MCNC: 65 MG/DL
TSH SERPL DL<=0.05 MIU/L-ACNC: 1.03 UIU/ML (ref 0.3–5)
VITAMIN B-12: 520 PG/ML (ref 232–1245)
VITAMIN D 25-HYDROXY: 35.5 NG/ML
WBC # BLD: 10.2 K/UL (ref 3.5–11.3)

## 2022-11-01 PROCEDURE — 80053 COMPREHEN METABOLIC PANEL: CPT

## 2022-11-01 PROCEDURE — 83735 ASSAY OF MAGNESIUM: CPT

## 2022-11-01 PROCEDURE — 82607 VITAMIN B-12: CPT

## 2022-11-01 PROCEDURE — 83036 HEMOGLOBIN GLYCOSYLATED A1C: CPT

## 2022-11-01 PROCEDURE — 36415 COLL VENOUS BLD VENIPUNCTURE: CPT

## 2022-11-01 PROCEDURE — 82550 ASSAY OF CK (CPK): CPT

## 2022-11-01 PROCEDURE — 82306 VITAMIN D 25 HYDROXY: CPT

## 2022-11-01 PROCEDURE — 84439 ASSAY OF FREE THYROXINE: CPT

## 2022-11-01 PROCEDURE — 80061 LIPID PANEL: CPT

## 2022-11-01 PROCEDURE — 85027 COMPLETE CBC AUTOMATED: CPT

## 2022-11-01 PROCEDURE — 84443 ASSAY THYROID STIM HORMONE: CPT

## 2022-11-02 ENCOUNTER — PATIENT MESSAGE (OUTPATIENT)
Dept: INTERNAL MEDICINE CLINIC | Age: 67
End: 2022-11-02

## 2022-11-02 DIAGNOSIS — R79.89 ELEVATED SERUM CREATININE: Primary | ICD-10-CM

## 2022-11-02 LAB
ESTIMATED AVERAGE GLUCOSE: 128 MG/DL
HBA1C MFR BLD: 6.1 % (ref 4–6)

## 2022-11-02 NOTE — TELEPHONE ENCOUNTER
From: Fabio Baumgarten  To: Dr. Luna Rolon: 11/2/2022 2:30 PM EDT  Subject: Kidney Function    I saw the results from my blood work yesterday. Everything looked well with the exception of my Kidney functions. I do not want to be like Julio Falling and have to go on dialysis. Is there something i can do now to get the function better?

## 2022-11-03 ENCOUNTER — TELEPHONE (OUTPATIENT)
Dept: INTERNAL MEDICINE CLINIC | Age: 67
End: 2022-11-03

## 2022-11-03 NOTE — TELEPHONE ENCOUNTER
Patient  patient calling states she was seen by cardiology and was asked to check  with her PCP about having fluid in the bronchial tubes  States she still have cough, drainage and he heard some wheezing.

## 2022-11-21 ENCOUNTER — TELEPHONE (OUTPATIENT)
Dept: INTERNAL MEDICINE CLINIC | Age: 67
End: 2022-11-21

## 2022-11-21 DIAGNOSIS — N39.0 URINARY TRACT INFECTION WITHOUT HEMATURIA, SITE UNSPECIFIED: Primary | ICD-10-CM

## 2022-11-23 ENCOUNTER — HOSPITAL ENCOUNTER (OUTPATIENT)
Facility: CLINIC | Age: 67
Discharge: HOME | End: 2022-11-23
Payer: COMMERCIAL

## 2022-11-23 DIAGNOSIS — N39.0 URINARY TRACT INFECTION WITHOUT HEMATURIA, SITE UNSPECIFIED: ICD-10-CM

## 2022-11-23 LAB
BILIRUBIN URINE: NEGATIVE
CASTS UA: NORMAL /LPF (ref 0–8)
COLOR: YELLOW
EPITHELIAL CELLS UA: NORMAL /HPF (ref 0–5)
GLUCOSE URINE: NEGATIVE
KETONES, URINE: ABNORMAL
LEUKOCYTE ESTERASE, URINE: ABNORMAL
NITRITE, URINE: NEGATIVE
PH UA: 5.5 (ref 5–8)
PROTEIN UA: ABNORMAL
RBC UA: NORMAL /HPF (ref 0–4)
SPECIFIC GRAVITY UA: 1.02 (ref 1–1.03)
TURBIDITY: CLEAR
URINE HGB: NEGATIVE
UROBILINOGEN, URINE: NORMAL
WBC UA: NORMAL /HPF (ref 0–5)

## 2022-11-23 PROCEDURE — 81001 URINALYSIS AUTO W/SCOPE: CPT

## 2022-11-23 PROCEDURE — 87086 URINE CULTURE/COLONY COUNT: CPT

## 2022-11-24 LAB
CULTURE: NORMAL
SPECIMEN DESCRIPTION: NORMAL

## 2022-11-28 ENCOUNTER — PATIENT MESSAGE (OUTPATIENT)
Dept: INTERNAL MEDICINE CLINIC | Age: 67
End: 2022-11-28

## 2022-11-28 NOTE — TELEPHONE ENCOUNTER
From: Ashlie Roberto  To: Dr. Mc Vickers: 11/28/2022 1:17 PM EST  Subject: UTI    I got the results from my Lab work and i was still feeling awful. I started an antibiotic. Cephalexin 500mg  two times a day. I am feeling a little better. I started on Tuesday I will stop on Tues. If you are not ok with this, let me know!

## 2022-12-20 ENCOUNTER — TELEPHONE (OUTPATIENT)
Dept: INTERNAL MEDICINE CLINIC | Age: 67
End: 2022-12-20

## 2022-12-20 DIAGNOSIS — Z12.31 SCREENING MAMMOGRAM FOR HIGH-RISK PATIENT: Primary | ICD-10-CM

## 2022-12-20 NOTE — TELEPHONE ENCOUNTER
----- Message from [place] Springs[place] sent at 12/16/2022  5:00 PM EST -----  Regarding: Mammogram   Can u send an order to [facility] fir my yearly mamogram?  Thank u

## 2022-12-23 ENCOUNTER — PATIENT MESSAGE (OUTPATIENT)
Dept: INTERNAL MEDICINE CLINIC | Age: 67
End: 2022-12-23

## 2022-12-27 RX ORDER — ESCITALOPRAM OXALATE 10 MG/1
TABLET ORAL
Qty: 90 TABLET | Refills: 0 | Status: SHIPPED | OUTPATIENT
Start: 2022-12-27

## 2022-12-27 RX ORDER — BENZONATATE 100 MG/1
100 CAPSULE ORAL 3 TIMES DAILY PRN
Qty: 30 CAPSULE | Refills: 0 | Status: SHIPPED | OUTPATIENT
Start: 2022-12-27 | End: 2023-01-03

## 2022-12-27 NOTE — TELEPHONE ENCOUNTER
Erna Ba is calling to request a refill on the following medication(s):    Medication Request:  Requested Prescriptions     Pending Prescriptions Disp Refills    escitalopram (LEXAPRO) 10 MG tablet [Pharmacy Med Name: Escitalopram Oxalate Oral Tablet 10 MG] 90 tablet 0     Sig: TAKE ONE TABLET BY MOUTH ONE TIME DAILY       Last Visit Date (If Applicable):  4/78/2797    Next Visit Date:    Visit date not found

## 2022-12-27 NOTE — TELEPHONE ENCOUNTER
From: Salvador Roa  To: Dr. Cesia Olsen: 12/23/2022 10:53 PM EST  Subject: Cortney Chen    Dont think i spelled this med correctly. It was prescribed for cough. Yellow pearlites. May i have a refill?

## 2023-01-19 RX ORDER — ESTRADIOL 10 UG/1
INSERT VAGINAL
Qty: 30 TABLET | Refills: 0 | Status: SHIPPED | OUTPATIENT
Start: 2023-01-19

## 2023-02-02 ENCOUNTER — HOSPITAL ENCOUNTER (OUTPATIENT)
Facility: CLINIC | Age: 68
Discharge: HOME | End: 2023-02-02
Payer: COMMERCIAL

## 2023-02-02 DIAGNOSIS — R35.0 URINARY FREQUENCY: ICD-10-CM

## 2023-02-02 LAB
BILIRUBIN URINE: NEGATIVE
CASTS UA: ABNORMAL /LPF (ref 0–8)
COLOR: YELLOW
CRYSTALS, UA: ABNORMAL /HPF
CRYSTALS, UA: ABNORMAL /HPF
EPITHELIAL CELLS UA: ABNORMAL /HPF (ref 0–5)
GLUCOSE UR STRIP.AUTO-MCNC: NEGATIVE MG/DL
KETONES UR STRIP.AUTO-MCNC: NEGATIVE MG/DL
LEUKOCYTE ESTERASE UR QL STRIP.AUTO: ABNORMAL
NITRITE UR QL STRIP.AUTO: NEGATIVE
PROT UR STRIP.AUTO-MCNC: 5.5 MG/DL (ref 5–8)
PROT UR STRIP.AUTO-MCNC: ABNORMAL MG/DL
RBC CLUMPS #/AREA URNS AUTO: ABNORMAL /HPF (ref 0–2)
SPECIFIC GRAVITY UA: 1.02 (ref 1–1.03)
TURBIDITY: ABNORMAL
URINE HGB: NEGATIVE
UROBILINOGEN, URINE: NORMAL
WBC UA: ABNORMAL /HPF (ref 0–5)

## 2023-02-02 PROCEDURE — 87086 URINE CULTURE/COLONY COUNT: CPT

## 2023-02-02 PROCEDURE — 81001 URINALYSIS AUTO W/SCOPE: CPT

## 2023-02-03 LAB
MICROORGANISM SPEC CULT: NORMAL
SPECIMEN DESCRIPTION: NORMAL

## 2023-02-24 ENCOUNTER — TELEPHONE (OUTPATIENT)
Dept: INTERNAL MEDICINE CLINIC | Age: 68
End: 2023-02-24

## 2023-02-24 NOTE — TELEPHONE ENCOUNTER
Pt states she tested positive for covid on Wednesday and was given mucinex but it is not working. States she is still congested and nasally. She would like to know if you recommend anything that is stronger?

## 2023-03-07 ENCOUNTER — PATIENT MESSAGE (OUTPATIENT)
Dept: INTERNAL MEDICINE CLINIC | Age: 68
End: 2023-03-07

## 2023-03-07 RX ORDER — BENZONATATE 100 MG/1
100 CAPSULE ORAL 3 TIMES DAILY PRN
Qty: 30 CAPSULE | Refills: 0 | Status: SHIPPED | OUTPATIENT
Start: 2023-03-07 | End: 2023-03-14

## 2023-03-07 NOTE — TELEPHONE ENCOUNTER
Simona Oreilly is calling to request a refill on the following medication(s):    Medication Request:  Requested Prescriptions     Pending Prescriptions Disp Refills    benzonatate (TESSALON PERLES) 100 MG capsule 30 capsule 0     Sig: Take 1 capsule by mouth 3 times daily as needed for Cough       Last Visit Date (If Applicable):  7/90/3451    Next Visit Date:    Visit date not found

## 2023-03-07 NOTE — TELEPHONE ENCOUNTER
From: Katerine uKrtz  To: Dr. Jarett Lo: 3/7/2023 9:56 AM EST  Subject: Cough Med    I just ran out of the constanza looking cough meds you prescribed. May i get a refill?

## 2023-03-08 ENCOUNTER — HOSPITAL ENCOUNTER (OUTPATIENT)
Dept: MRI IMAGING | Facility: CLINIC | Age: 68
Discharge: HOME | End: 2023-03-10
Payer: COMMERCIAL

## 2023-03-08 DIAGNOSIS — N28.89 RENAL MASS: ICD-10-CM

## 2023-03-08 LAB — POC CREATININE: 1.2 MG/DL (ref 0.6–1.4)

## 2023-03-08 PROCEDURE — A9579 GAD-BASE MR CONTRAST NOS,1ML: HCPCS | Performed by: UROLOGY

## 2023-03-08 PROCEDURE — 74183 MRI ABD W/O CNTR FLWD CNTR: CPT

## 2023-03-08 PROCEDURE — 82565 ASSAY OF CREATININE: CPT

## 2023-03-08 PROCEDURE — 6360000004 HC RX CONTRAST MEDICATION: Performed by: UROLOGY

## 2023-03-08 RX ORDER — SODIUM CHLORIDE 0.9 % (FLUSH) 0.9 %
10 SYRINGE (ML) INJECTION PRN
Status: DISCONTINUED | OUTPATIENT
Start: 2023-03-08 | End: 2023-03-11 | Stop reason: HOSPADM

## 2023-03-08 RX ORDER — 0.9 % SODIUM CHLORIDE 0.9 %
20 INTRAVENOUS SOLUTION INTRAVENOUS ONCE
Status: DISCONTINUED | OUTPATIENT
Start: 2023-03-08 | End: 2023-03-11 | Stop reason: HOSPADM

## 2023-03-08 RX ADMIN — GADOTERIDOL 10 ML: 279.3 INJECTION, SOLUTION INTRAVENOUS at 14:45

## 2023-03-09 RX ORDER — LOSARTAN POTASSIUM 100 MG/1
TABLET ORAL
Qty: 90 TABLET | Refills: 0 | Status: SHIPPED | OUTPATIENT
Start: 2023-03-09

## 2023-03-09 RX ORDER — ATENOLOL 50 MG/1
TABLET ORAL
Qty: 180 TABLET | Refills: 0 | Status: SHIPPED | OUTPATIENT
Start: 2023-03-09

## 2023-03-09 NOTE — TELEPHONE ENCOUNTER
Leelee Royal is calling to request a refill on the following medication(s):    Medication Request:  Requested Prescriptions     Pending Prescriptions Disp Refills    losartan (COZAAR) 100 MG tablet [Pharmacy Med Name: Losartan Potassium Oral Tablet 100 MG] 90 tablet 0     Sig: TAKE ONE TABLET BY MOUTH ONE TIME DAILY    atenolol (TENORMIN) 50 MG tablet [Pharmacy Med Name: Atenolol Oral Tablet 50 MG] 180 tablet 0     Sig: TAKE ONE TABLET BY MOUTH TWICE DAILY       Last Visit Date (If Applicable):  2/54/7640    Next Visit Date:    3/13/2023

## 2023-03-13 ENCOUNTER — OFFICE VISIT (OUTPATIENT)
Dept: INTERNAL MEDICINE CLINIC | Age: 68
End: 2023-03-13
Payer: COMMERCIAL

## 2023-03-13 VITALS
HEART RATE: 81 BPM | DIASTOLIC BLOOD PRESSURE: 70 MMHG | HEIGHT: 63 IN | OXYGEN SATURATION: 97 % | SYSTOLIC BLOOD PRESSURE: 100 MMHG | TEMPERATURE: 98.2 F | WEIGHT: 210 LBS | BODY MASS INDEX: 37.21 KG/M2

## 2023-03-13 DIAGNOSIS — J01.90 ACUTE BACTERIAL SINUSITIS: Primary | ICD-10-CM

## 2023-03-13 DIAGNOSIS — N28.1 RENAL CYST: ICD-10-CM

## 2023-03-13 DIAGNOSIS — I10 ESSENTIAL HYPERTENSION: ICD-10-CM

## 2023-03-13 DIAGNOSIS — Z71.3 DIETARY COUNSELING: ICD-10-CM

## 2023-03-13 DIAGNOSIS — B96.89 ACUTE BACTERIAL SINUSITIS: Primary | ICD-10-CM

## 2023-03-13 DIAGNOSIS — E11.9 TYPE 2 DIABETES MELLITUS WITHOUT COMPLICATION, WITHOUT LONG-TERM CURRENT USE OF INSULIN (HCC): ICD-10-CM

## 2023-03-13 DIAGNOSIS — E66.01 SEVERE OBESITY (BMI 35.0-39.9) WITH COMORBIDITY (HCC): ICD-10-CM

## 2023-03-13 PROCEDURE — G0444 DEPRESSION SCREEN ANNUAL: HCPCS | Performed by: INTERNAL MEDICINE

## 2023-03-13 PROCEDURE — 99214 OFFICE O/P EST MOD 30 MIN: CPT | Performed by: INTERNAL MEDICINE

## 2023-03-13 PROCEDURE — G8417 CALC BMI ABV UP PARAM F/U: HCPCS | Performed by: INTERNAL MEDICINE

## 2023-03-13 PROCEDURE — 3074F SYST BP LT 130 MM HG: CPT | Performed by: INTERNAL MEDICINE

## 2023-03-13 PROCEDURE — G0447 BEHAVIOR COUNSEL OBESITY 15M: HCPCS | Performed by: INTERNAL MEDICINE

## 2023-03-13 PROCEDURE — 3078F DIAST BP <80 MM HG: CPT | Performed by: INTERNAL MEDICINE

## 2023-03-13 PROCEDURE — 1123F ACP DISCUSS/DSCN MKR DOCD: CPT | Performed by: INTERNAL MEDICINE

## 2023-03-13 RX ORDER — OMEPRAZOLE 40 MG/1
CAPSULE, DELAYED RELEASE ORAL
COMMUNITY
Start: 2023-03-06

## 2023-03-13 RX ORDER — CEFUROXIME AXETIL 500 MG/1
500 TABLET ORAL 2 TIMES DAILY
Qty: 14 TABLET | Refills: 0 | Status: SHIPPED | OUTPATIENT
Start: 2023-03-13 | End: 2023-03-20

## 2023-03-13 RX ORDER — DIAZEPAM 5 MG/1
TABLET ORAL
COMMUNITY
Start: 2023-01-13

## 2023-03-13 ASSESSMENT — PATIENT HEALTH QUESTIONNAIRE - PHQ9
5. POOR APPETITE OR OVEREATING: 0
9. THOUGHTS THAT YOU WOULD BE BETTER OFF DEAD, OR OF HURTING YOURSELF: 0
10. IF YOU CHECKED OFF ANY PROBLEMS, HOW DIFFICULT HAVE THESE PROBLEMS MADE IT FOR YOU TO DO YOUR WORK, TAKE CARE OF THINGS AT HOME, OR GET ALONG WITH OTHER PEOPLE: 0
6. FEELING BAD ABOUT YOURSELF - OR THAT YOU ARE A FAILURE OR HAVE LET YOURSELF OR YOUR FAMILY DOWN: 0
7. TROUBLE CONCENTRATING ON THINGS, SUCH AS READING THE NEWSPAPER OR WATCHING TELEVISION: 0
3. TROUBLE FALLING OR STAYING ASLEEP: 0
1. LITTLE INTEREST OR PLEASURE IN DOING THINGS: 0
4. FEELING TIRED OR HAVING LITTLE ENERGY: 0
SUM OF ALL RESPONSES TO PHQ QUESTIONS 1-9: 0
SUM OF ALL RESPONSES TO PHQ QUESTIONS 1-9: 0
SUM OF ALL RESPONSES TO PHQ9 QUESTIONS 1 & 2: 0
SUM OF ALL RESPONSES TO PHQ QUESTIONS 1-9: 0
2. FEELING DOWN, DEPRESSED OR HOPELESS: 0
SUM OF ALL RESPONSES TO PHQ QUESTIONS 1-9: 0
8. MOVING OR SPEAKING SO SLOWLY THAT OTHER PEOPLE COULD HAVE NOTICED. OR THE OPPOSITE, BEING SO FIGETY OR RESTLESS THAT YOU HAVE BEEN MOVING AROUND A LOT MORE THAN USUAL: 0

## 2023-03-13 NOTE — PROGRESS NOTES
Sandi Alvarez is a 78 y.o. female who presents for   Chief Complaint   Patient presents with    Post-COVID Symptoms     4 weeks ago    Health Maintenance     Pneumo, DM foot, Dtap, Shingles, Dexa, DM Eye, Flu, DM Alb To Cr    and follow up of chronic medical problems.   Patient Active Problem List   Diagnosis    Tachycardia    HTN (hypertension)    Fibromyalgia    Chronic UTI    IBS (irritable bowel syndrome)    Obesity    Hypercholesterolemia    Chondromalacia patellae of right knee    Patellar contusion    Prediabetes    History of hysterectomy for benign disease    Vaginal atrophy    Major depressive disorder, single episode with anxious distress    Type 2 diabetes mellitus without complication, without long-term current use of insulin ([facility])     HPI  Here for evaluation of sinus problems and also wants to discuss her MRI regarding the renal cyst    Current Outpatient Medications   Medication Sig Dispense Refill    diazePAM (VALIUM) 5 MG tablet       omeprazole (PRILOSEC) 40 MG delayed release capsule       cefUROXime (CEFTIN) 500 MG tablet Take 1 tablet by mouth 2 times daily for 7 days 14 tablet 0    losartan (COZAAR) 100 MG tablet TAKE ONE TABLET BY MOUTH ONE TIME DAILY 90 tablet 0    atenolol (TENORMIN) 50 MG tablet TAKE ONE TABLET BY MOUTH TWICE DAILY 180 tablet 0    benzonatate (TESSALON PERLES) 100 MG capsule Take 1 capsule by mouth 3 times daily as needed for Cough 30 capsule 0    Estradiol (VAGIFEM) 10 MCG TABS vaginal tablet Place one tablet vaginally once a day for two weeks then place one tablet vaginally two times a week 30 tablet 0    escitalopram (LEXAPRO) 10 MG tablet TAKE ONE TABLET BY MOUTH ONE TIME DAILY 90 tablet 0    montelukast (SINGULAIR) 10 MG tablet Take 1 tablet by mouth daily 30 tablet 0    buPROPion (WELLBUTRIN) 75 MG tablet TAKE ONE TABLET BY MOUTH TWICE DAILY (Patient taking differently: Take 1 mg by mouth daily (with breakfast)) 60 tablet 0    diclofenac sodium (VOLTAREN) 1 % GEL Apply 2 g topically 4 times daily 120 g 0    diclofenac sodium (VOLTAREN) 1 % GEL Apply 4 g topically 4 times daily 150 g 0    diclofenac (FLECTOR) 1.3 % PTCH patch Place 1 patch onto the skin 2 times daily 60 patch 0    ibuprofen (ADVIL;MOTRIN) 800 MG tablet TAKE 1 TABLET EVERY 6 HOURS AS NEEDED FOR PAIN 60 tablet 3    rosuvastatin (CRESTOR) 10 MG tablet Take 10 mg by mouth daily       No current facility-administered medications for this visit.        Allergies   Allergen Reactions    Contrast [Iodides] Hives     Pt had hives during an IVP    Bactrim [Sulfamethoxazole-Trimethoprim] Hives    Sulfa Antibiotics     Morphine Nausea And Vomiting       Past Medical History:   Diagnosis Date    Anxiety     Chronic UTI     Fibromyalgia     Hematuria due to chronic cystitis     HTN (hypertension)     IBS (irritable bowel syndrome)     Major depressive disorder, single episode with anxious distress 10/8/2019    Obesity     Prediabetes     Tachycardia     Thyroid disease     nodules, hurshimoto disease    Unspecified sleep apnea     does not use machine       Past Surgical History:   Procedure Laterality Date    ABDOMEN SURGERY      abdominoplasty 2002    BLEPHAROPLASTY      BREAST REDUCTION SURGERY      CARDIAC CATHETERIZATION      clearing blockage w/ medication 80%    CHOLECYSTECTOMY      COLONOSCOPY  2013    10 yrs    CYST REMOVAL Bilateral     feet    FOOT TENDON SURGERY Left 12/02/2015    HYSTERECTOMY, TOTAL ABDOMINAL (CERVIX REMOVED)      with BSO    TONSILLECTOMY AND ADENOIDECTOMY         Family History   Problem Relation Age of Onset    Heart Disease Mother     Parkinsonism Mother     Hypertension Mother     Cancer Sister         cervical/cervical 1970s    Hypertension Sister     Heart Disease Brother         25 stints    Hypertension Brother     Elevated Lipids Brother     Other Brother         stents    High Blood Pressure Other      ROS  Constitutional:  Negative for fatigue, loss of appetite and unexpected weight change  HEENT            : Negative for neck stiffness and pain, no congestion or sinus pressure  Eyes                : No visual disturbance or pain  Cardiovascular: No chest pain or palpitations or leg swelling  Respiratory      : Negative for cough, shortness of breath or wheezing  Gastrointestinal: Negative for abdominal pain, constipation or diarrhea and bloating No nausea or vomiting  Genitourinary:     No urgency or frequency, no burning or hematuria  Musculoskeletal: No arthralgias, back pain or myalgias  Skin                  : Negative for rash or erythema  Neurological    : Negative for dizziness, weakness, tremors ,light headedness or syncope  Psychiatric       : Negative for dysphoric mood, sleep disturbances, nervous or anxious, or decreased concentration  All other review of systems was negative    Objective  Physical Examination:    Nursing note reviewed    /70 (Site: Left Upper Arm, Position: Sitting, Cuff Size: Medium Adult)   Pulse 81   Temp 98.2 °F (36.8 °C) (Temporal)   Ht 5' 3\" (1.6 m)   Wt 210 lb (95.3 kg)   SpO2 97%   BMI 37.20 kg/m²   BP Readings from Last 3 Encounters:   03/13/23 100/70   09/29/22 (!) 140/78   06/14/22 (!) 140/90         Constitutional:  Elijah Mendoza is oriented to place, person and time ,appears well-developed and well-nourished  HEENT:  Atraumatic and normocephalic, external ears normal bilaterally, nose normal no oropharyngeal exudate and is clear and moist  Eyes:  EOCM normal; conjunctivae normal; PERRLA bilaterally  Neck:  Normal range of motion, neck supple, no JVD and no thyromegaly  Cardiovascular:  RRR, normal heart sounds and intact distal pulses  Pulmonary:  effort normal and breath sounds normal bilaterally,no wheezes or rales, no respiratory distress  Abdominal:  Soft, non-tender; normal bowel sounds, no masses  Musculoskeletal:  Normal range of motion and no edema or tenderness bilaterally  No lymphadenopathy  Neurological:  alert, oriented, and normal reflexes bilaterally  Skin: warm and dry  Psychiatric:  normal mood and effect; behavior normal.    Labs:   Lab Results   Component Value Date    LABA1C 6.1 (H) 11/01/2022     Lab Results   Component Value Date    CHOL 139 11/01/2022     Lab Results   Component Value Date    HDL 53 11/01/2022     No results found for: [address]  Lab Results   Component Value Date    TRIG 65 11/01/2022     No results found for: [place], [place]  Lab Results   Component Value Date    WBC 10.2 11/01/2022    HGB 13.1 11/01/2022    HCT 41.9 11/01/2022    MCV 88.6 11/01/2022     11/01/2022     No results found for: INR, PROTIME  Lab Results   Component Value Date    GLUCOSE 88 11/01/2022    CREATININE 1.2 03/08/2023    BUN 36 (H) 11/01/2022     11/01/2022    K 4.6 11/01/2022     11/01/2022    CO2 24 11/01/2022     Lab Results   Component Value Date    ALT 13 11/01/2022    AST 16 11/01/2022    ALKPHOS 56 11/01/2022    BILITOT 0.5 11/01/2022     Lab Results   Component Value Date    LABPROT 7.5 11/28/2012    LABALBU 4.4 11/01/2022     Lab Results   Component Value Date    TSH 1.03 11/01/2022     Assessment:  1. Acute bacterial sinusitis    2. Renal cyst    3. Type 2 diabetes mellitus without complication, without long-term current use of insulin ([address]    4. Severe obesity (BMI 35.0-39. 9) with comorbidity ([address]    5.  Essential hypertension        Plan:  Patient is started on Ceftin 500 mg twice daily for her sinus infection and advised to call me back if any issues  Patient's last hemoglobin A1c was 6.2 and continue current treatment  Patient's blood pressure is stable on current medications  Patient has a cyst on the kidneys which she is following with serial MRIs and recent MRI showed increase in the size from 2.7-3.5 and advised patient to follow-up with urology and patient was recommended to follow their recommendations and I suggested that the cyst may need  to be removed as it is growing size  Review as scheduled 1. [employer] received counseling on the following healthy behaviors: nutrition and exercise    2. Prior labs and health maintenance reviewed. 3.  Discussed use, benefit, and side effects of prescribed medications. Barriers to medication compliance addressed. All her questions were answered. Pt voiced understanding. Matt Matos will continue current medications, diet and exercise. Orders Placed This Encounter   Medications    cefUROXime (CEFTIN) 500 MG tablet     Sig: Take 1 tablet by mouth 2 times daily for 7 days     Dispense:  14 tablet     Refill:  0          Completed Refills               Requested Prescriptions     Signed Prescriptions Disp Refills    cefUROXime (CEFTIN) 500 MG tablet 14 tablet 0     Sig: Take 1 tablet by mouth 2 times daily for 7 days     4. Patient given educational materials - see patient instructions    5. Was a self-tracking handout given in paper form or via [employer]? NO    No orders of the defined types were placed in this encounter. No follow-ups on file. Patient voiced understanding and agreed to treatment plan. Electronically signed by Ibeth Mcmillan MD on 3/13/2023 at 3:44 PM    This note is created with a voice recognition program and while intend to generate a document that accurately reflects the content of the visit, no guarantee can be provided that every mistake has been identified and corrected by editing. BMI was elevated today, and weight loss plan recommended is : daily exercise regimen.

## 2023-03-15 NOTE — PATIENT INSTRUCTIONS
Learning About Obesity  What is obesity? Obesity means having an unhealthy amount of body fat. This puts your health in danger. It can lead to other health problems, such as type 2 diabetes and high blood pressure. How do you know if your weight is in the obesity range? To know if your weight is in the obesity range, your doctor looks at your body mass index (BMI) and waist size. BMI is a number that is calculated from your weight and your height. To figure out your BMI for yourself, you can use an online tool, such as http://www.gandara.com/ on the [employer]. If your BMI is 30.0 or higher, it falls within the obesity range. Keep in mind that BMI and waist size are only guides. They are not tools to determine your ideal body weight. What causes obesity? When you take in more calories than you burn off, you gain weight. How you eat, how active you are, and other things affect how your body uses calories and whether you gain weight. If you have family members who have too much body fat, you may have inherited a tendency to gain weight. And your family also helps form your eating and lifestyle habits, which can lead to obesity. Also, our busy lives make it harder to plan and cook healthy meals. For many of us, it's easier to reach for prepared foods, go out to eat, or go to the drive-through. But these foods are often high in saturated fat and calories. Portions are often too large. What can you do to reach a healthy weight? Focus on health, not diets. Diets are hard to stay on and don't work in the long run. It is very hard to stay with a diet that includes lots of big changes in your eating habits. Instead of a diet, focus on lifestyle changes that will improve your health and achieve the right balance of energy and calories. To lose weight, you need to burn more calories than you take in.  You can do it by eating healthy foods in reasonable amounts and becoming more active, even a little bit every day. Making small changes over time can add up to a lot. Make a plan for change. Many people have found that naming their reasons for change and staying focused on their plan can make a big difference. Work with your doctor to create a plan that is right for you. Ask yourself: Shad Juarez are my personal, most powerful reasons for wanting this change? What will my life look like when I've made the change? \"  Set your long-term goal. Make it specific, such as \"I will lose x pounds. \"  Break your long-term goal into smaller, short-term goals. Make these small steps specific and within your reach, things you know you can do. These steps are what keep you going from day to day. Talk with your doctor about other weight-loss options. If you have a BMI in a certain range and have not been able to lose weight with diet and exercise, medicine or surgery may be an option for you. Before your doctor will prescribe medicines or surgery, he or she will probably want you to be more active and follow your healthy eating plan for a period of time. These habits are key lifelong changes for managing your weight, with or without other medical treatment. And these changes can help you avoid weight-related health problems. How can you stay on your plan for change? Be ready. Choose to start during a time when there are few events like holidays, social events, and high-stress periods. These events might trigger slip-ups. Decide on your first few steps. Most people have more success when they make small changes, one step at a time. For example, you might switch a daily candy bar to a piece of fruit, walk 10 minutes more, or add more vegetables to a meal.  Line up your support people. Make sure you're not going to be alone as you make this change. Connect with people who understand how important it is to you.  Ask family members and friends for help in keeping with your plan. And think about who could make it harder for you, and how to handle them.  Try tracking. People who keep track of what they eat, feel, and do are better at losing weight. Try writing down things like:  What and how much you eat.  How you feel before and after each meal.  Details about each meal (like eating out or at home, eating alone, or with friends or family).  What you do to be active.  Look and plan. As you track, look for patterns that you may want to change. Take note of:  When you eat and whether you skip meals.  How often you eat out.  How many fruits and vegetables you eat.  When you eat beyond feeling full.  When and why you eat for reasons other than being hungry.  When you stray from your plan, don't get upset. Figure out what made you slip up and how you can fix it.  Can you take medicines or have surgery to lose weight?  If you have a BMI in a certain range and have not been able to lose weight with diet and exercise, medicine or surgery may be an option for you.  If you have a BMI of at least 30.0 (or a BMI of at least 27.0 and another health problem related to your weight), ask your doctor about weight-loss medicines. They work by making you feel less hungry, making you feel full more quickly, or changing how you digest fat. Medicines are used along with diet changes and more physical activity to help you make lasting changes.  If you have a BMI of 40.0 or more (or a BMI of 35.0 or more and another health problem related to your weight), your doctor may talk with you about surgery. Weight-loss surgery has risks, and you will need to work with your doctor to compare the risk of having obesity with the risks of surgery.  With any option you choose, you will still need to eat a healthy diet and get regular exercise.  Follow-up care is a key part of your treatment and safety. Be sure to make and go to all appointments, and call your doctor if you are having problems. It's also a good idea to know  your test results and keep a list of the medicines you take. Where can you learn more? Go to http://www.sandoval.com/ and enter N111 to learn more about \"Learning About Obesity. \"  Current as of: August 25, 2022               Content Version: 13.5  © 1580-3041 [employer], [employer]. Care instructions adapted under license by [facility] ([facility]). If you have questions about a medical condition or this instruction, always ask your healthcare professional. [address] any warranty or liability for your use of this information.

## 2023-03-27 RX ORDER — ESCITALOPRAM OXALATE 10 MG/1
TABLET ORAL
Qty: 90 TABLET | Refills: 0 | Status: SHIPPED | OUTPATIENT
Start: 2023-03-27

## 2023-03-27 NOTE — TELEPHONE ENCOUNTER
Katerine Kurtz is calling to request a refill on the following medication(s):    Medication Request:  Requested Prescriptions     Pending Prescriptions Disp Refills    escitalopram (LEXAPRO) 10 MG tablet [Pharmacy Med Name: Escitalopram Oxalate Oral Tablet 10 MG] 90 tablet 0     Sig: TAKE ONE TABLET BY MOUTH ONE TIME DAILY       Last Visit Date (If Applicable):  9/66/2492    Next Visit Date:    9/14/2023

## 2023-03-29 ENCOUNTER — PATIENT MESSAGE (OUTPATIENT)
Dept: INTERNAL MEDICINE CLINIC | Age: 68
End: 2023-03-29

## 2023-03-31 ENCOUNTER — TELEPHONE (OUTPATIENT)
Dept: INTERNAL MEDICINE CLINIC | Age: 68
End: 2023-03-31

## 2023-03-31 NOTE — TELEPHONE ENCOUNTER
----- Message from Winsome Ryder sent at 3/29/2023  1:34 PM EDT -----  Subject: Appointment Request    Reason for Call: Established Patient Appointment needed: Flu Shot    QUESTIONS    Reason for appointment request? Other - [facility] was unable to book      Additional Information for Provider? Pt states that for her surgery they   are requesting pt get a Flu shot. She states that she has called multiple   local pharmacies and they are out due to the vaccine being out of season. She is requesting if the practice would be able to do this for her?  Please   advise.  ---------------------------------------------------------------------------  --------------  Matt SALAS  [phone]; OK to leave message on voicemail  ---------------------------------------------------------------------------  --------------  SCRIPT ANSWERS  COVID Screen: Colton Perry

## 2023-04-01 ENCOUNTER — HOSPITAL ENCOUNTER (OUTPATIENT)
Dept: MAMMOGRAPHY | Age: 68
End: 2023-04-01
Payer: COMMERCIAL

## 2023-04-01 DIAGNOSIS — Z12.31 SCREENING MAMMOGRAM FOR HIGH-RISK PATIENT: ICD-10-CM

## 2023-04-01 PROCEDURE — 77063 BREAST TOMOSYNTHESIS BI: CPT

## 2023-04-03 ENCOUNTER — OFFICE VISIT (OUTPATIENT)
Dept: OBGYN CLINIC | Age: 68
End: 2023-04-03
Payer: COMMERCIAL

## 2023-04-03 VITALS
SYSTOLIC BLOOD PRESSURE: 139 MMHG | DIASTOLIC BLOOD PRESSURE: 88 MMHG | WEIGHT: 209.8 LBS | HEART RATE: 81 BPM | BODY MASS INDEX: 37.16 KG/M2

## 2023-04-03 DIAGNOSIS — Z12.31 SCREENING MAMMOGRAM FOR BREAST CANCER: ICD-10-CM

## 2023-04-03 DIAGNOSIS — N95.2 VAGINAL ATROPHY: ICD-10-CM

## 2023-04-03 DIAGNOSIS — Z78.0 POSTMENOPAUSAL: ICD-10-CM

## 2023-04-03 DIAGNOSIS — Z01.419 WELL WOMAN EXAM WITH ROUTINE GYNECOLOGICAL EXAM: Primary | ICD-10-CM

## 2023-04-03 PROCEDURE — 3079F DIAST BP 80-89 MM HG: CPT | Performed by: STUDENT IN AN ORGANIZED HEALTH CARE EDUCATION/TRAINING PROGRAM

## 2023-04-03 PROCEDURE — 99397 PER PM REEVAL EST PAT 65+ YR: CPT | Performed by: STUDENT IN AN ORGANIZED HEALTH CARE EDUCATION/TRAINING PROGRAM

## 2023-04-03 PROCEDURE — 3075F SYST BP GE 130 - 139MM HG: CPT | Performed by: STUDENT IN AN ORGANIZED HEALTH CARE EDUCATION/TRAINING PROGRAM

## 2023-04-03 NOTE — PROGRESS NOTES
bruits  Breast:  (Chest): normal appearance, no masses or tenderness  Abdomen: soft, non-tender, non-distended, no right upper quadrant tenderness and no CVA tenderness  Pelvic Exam:   External genitalia: General appearance; normal, Hair distribution; normal, Lesions absent  Urinary system: urethral meatus normal  Vaginal: atrophic mucosa, no discharge  Cervix: surgically absent  Adnexa: surgically absent  Uterus: surgically absent  Rectal Exam: exam declined by patient  Musculoskeletal: no gross abnormalities  Extremities: non-tender BLE and non-edematous  Psych:  oriented to time, place and person     DATA:  No results found for this visit on 23. ASSESSMENT & PLAN:    Gaetano Go is a 78 y.o. female  No LMP recorded. Patient has had a hysterectomy. Annual:  Mammogram: BIRADS 2 . Next due now. Colonoscopy: . Due . Pap Smears: Discontinued after TREVIN BSO  Family Planning: TREVIN BSO  DEXA: Ordered    Vaginal Atrophy   - Desires referral to Dr. Rachel Kenyon for possible [facility] procedure. Patient Active Problem List    Diagnosis Date Noted    Type 2 diabetes mellitus without complication, without long-term current use of insulin ([facility] 2023     Priority: Medium    Major depressive disorder, single episode with anxious distress 10/08/2019    History of hysterectomy for benign disease 2018    Vaginal atrophy 2018    Prediabetes     Chondromalacia patellae of right knee 2014    Patellar contusion 2014    Tachycardia     HTN (hypertension)     Fibromyalgia     Chronic UTI     IBS (irritable bowel syndrome)     Obesity     Hypercholesterolemia        Return in about 1 year (around 4/3/2024) for Annual.  No Patient Care Coordination Note on file. Counseling Completed:    Discussed need for repeat pap as per American Society for Colposcopy and Cervical Pathology guidelines.   Discussed need for mammograms every 1 year, If >42 yo and last mammogram was

## 2023-04-06 ENCOUNTER — HOSPITAL ENCOUNTER (OUTPATIENT)
Age: 68
Setting detail: OUTPATIENT SURGERY
Discharge: HOME | End: 2023-04-06
Attending: INTERNAL MEDICINE | Admitting: INTERNAL MEDICINE
Payer: COMMERCIAL

## 2023-04-06 ENCOUNTER — ANESTHESIA (OUTPATIENT)
Dept: OPERATING ROOM | Age: 68
End: 2023-04-06
Payer: COMMERCIAL

## 2023-04-06 ENCOUNTER — ANESTHESIA EVENT (OUTPATIENT)
Dept: OPERATING ROOM | Age: 68
End: 2023-04-06
Payer: COMMERCIAL

## 2023-04-06 VITALS
SYSTOLIC BLOOD PRESSURE: 128 MMHG | TEMPERATURE: 97.5 F | HEART RATE: 83 BPM | RESPIRATION RATE: 16 BRPM | DIASTOLIC BLOOD PRESSURE: 89 MMHG | OXYGEN SATURATION: 96 %

## 2023-04-06 DIAGNOSIS — K21.9 GASTROESOPHAGEAL REFLUX DISEASE WITHOUT ESOPHAGITIS: ICD-10-CM

## 2023-04-06 LAB
EGFR, POC: >60 ML/MIN/1.73M2
GLUCOSE BLD-MCNC: 146 MG/DL (ref 74–100)
POC BUN: 22 MG/DL (ref 8–26)
POC CREATININE: 0.96 MG/DL (ref 0.51–1.19)

## 2023-04-06 PROCEDURE — 88305 TISSUE EXAM BY PATHOLOGIST: CPT

## 2023-04-06 PROCEDURE — 2709999900 HC NON-CHARGEABLE SUPPLY: Performed by: INTERNAL MEDICINE

## 2023-04-06 PROCEDURE — 3609019000 HC EGD CAPSULE ENDOSCOPY: Performed by: INTERNAL MEDICINE

## 2023-04-06 PROCEDURE — 2580000003 HC RX 258: Performed by: ANESTHESIOLOGY

## 2023-04-06 PROCEDURE — 82565 ASSAY OF CREATININE: CPT

## 2023-04-06 PROCEDURE — 7100000010 HC PHASE II RECOVERY - FIRST 15 MIN: Performed by: INTERNAL MEDICINE

## 2023-04-06 PROCEDURE — 82947 ASSAY GLUCOSE BLOOD QUANT: CPT

## 2023-04-06 PROCEDURE — 3609012400 HC EGD TRANSORAL BIOPSY SINGLE/MULTIPLE: Performed by: INTERNAL MEDICINE

## 2023-04-06 PROCEDURE — 3700000001 HC ADD 15 MINUTES (ANESTHESIA): Performed by: INTERNAL MEDICINE

## 2023-04-06 PROCEDURE — 3700000000 HC ANESTHESIA ATTENDED CARE: Performed by: INTERNAL MEDICINE

## 2023-04-06 PROCEDURE — 2720000010 HC SURG SUPPLY STERILE: Performed by: INTERNAL MEDICINE

## 2023-04-06 PROCEDURE — 6360000002 HC RX W HCPCS: Performed by: NURSE ANESTHETIST, CERTIFIED REGISTERED

## 2023-04-06 PROCEDURE — 84520 ASSAY OF UREA NITROGEN: CPT

## 2023-04-06 PROCEDURE — 7100000011 HC PHASE II RECOVERY - ADDTL 15 MIN: Performed by: INTERNAL MEDICINE

## 2023-04-06 PROCEDURE — 2500000003 HC RX 250 WO HCPCS: Performed by: NURSE ANESTHETIST, CERTIFIED REGISTERED

## 2023-04-06 RX ORDER — GLYCOPYRROLATE 1 MG/5 ML
SYRINGE (ML) INTRAVENOUS PRN
Status: DISCONTINUED | OUTPATIENT
Start: 2023-04-06 | End: 2023-04-06 | Stop reason: SDUPTHER

## 2023-04-06 RX ORDER — SODIUM CHLORIDE, SODIUM LACTATE, POTASSIUM CHLORIDE, CALCIUM CHLORIDE 600; 310; 30; 20 MG/100ML; MG/100ML; MG/100ML; MG/100ML
INJECTION, SOLUTION INTRAVENOUS CONTINUOUS PRN
Status: DISCONTINUED | OUTPATIENT
Start: 2023-04-06 | End: 2023-04-06

## 2023-04-06 RX ORDER — PROPOFOL 10 MG/ML
INJECTION, EMULSION INTRAVENOUS PRN
Status: DISCONTINUED | OUTPATIENT
Start: 2023-04-06 | End: 2023-04-06 | Stop reason: SDUPTHER

## 2023-04-06 RX ORDER — LIDOCAINE HYDROCHLORIDE 10 MG/ML
INJECTION, SOLUTION EPIDURAL; INFILTRATION; INTRACAUDAL; PERINEURAL PRN
Status: DISCONTINUED | OUTPATIENT
Start: 2023-04-06 | End: 2023-04-06 | Stop reason: SDUPTHER

## 2023-04-06 RX ORDER — SODIUM CHLORIDE, SODIUM LACTATE, POTASSIUM CHLORIDE, CALCIUM CHLORIDE 600; 310; 30; 20 MG/100ML; MG/100ML; MG/100ML; MG/100ML
INJECTION, SOLUTION INTRAVENOUS CONTINUOUS
Status: DISCONTINUED | OUTPATIENT
Start: 2023-04-06 | End: 2023-04-06 | Stop reason: HOSPADM

## 2023-04-06 RX ADMIN — PROPOFOL 50 MG: 10 INJECTION, EMULSION INTRAVENOUS at 09:11

## 2023-04-06 RX ADMIN — Medication 0.2 MG: at 09:00

## 2023-04-06 RX ADMIN — SODIUM CHLORIDE, POTASSIUM CHLORIDE, SODIUM LACTATE AND CALCIUM CHLORIDE: 600; 310; 30; 20 INJECTION, SOLUTION INTRAVENOUS at 08:37

## 2023-04-06 RX ADMIN — LIDOCAINE HYDROCHLORIDE 50 MG: 10 INJECTION, SOLUTION EPIDURAL; INFILTRATION; INTRACAUDAL; PERINEURAL at 09:03

## 2023-04-06 RX ADMIN — PROPOFOL 30 MG: 10 INJECTION, EMULSION INTRAVENOUS at 09:14

## 2023-04-06 RX ADMIN — PROPOFOL 50 MG: 10 INJECTION, EMULSION INTRAVENOUS at 09:08

## 2023-04-06 RX ADMIN — PROPOFOL 100 MG: 10 INJECTION, EMULSION INTRAVENOUS at 09:03

## 2023-04-06 ASSESSMENT — PAIN - FUNCTIONAL ASSESSMENT: PAIN_FUNCTIONAL_ASSESSMENT: 0-10

## 2023-04-06 ASSESSMENT — LIFESTYLE VARIABLES: SMOKING_STATUS: 0

## 2023-04-06 NOTE — OP NOTE
Placement of the Bravo catheter was confirmed with endoscopy. Stomach: Multiple small to medium size gastric polyps were seen in the body of the stomach. Biopsies were done. Stomach was seen during retroflexion view. Duodenum: Normal    Recommendations: Follow-up with the biopsy results. Follow-up at the GI office.                                            Electronically signed by Silvia Maurice MD on 4/6/2023 at 9:16 AM

## 2023-04-06 NOTE — PROGRESS NOTES
[address] [employer] teaching was given. Buttons assigned. Understanding was stated per patient. Questions answered.

## 2023-04-06 NOTE — ANESTHESIA PRE PROCEDURE
Patient Active Problem List   Diagnosis Code    Tachycardia R00.0    HTN (hypertension) I10    Fibromyalgia M79.7    Chronic UTI N39.0    IBS (irritable bowel syndrome) K58.9    Obesity E66.9    Hypercholesterolemia E78.00    Chondromalacia patellae of right knee M22.41    Patellar contusion S80.00XA    Prediabetes R73.03    History of hysterectomy for benign disease Z90.710    Vaginal atrophy N95.2    Major depressive disorder, single episode with anxious distress F32.9    Type 2 diabetes mellitus without complication, without long-term current use of insulin (HCC) E11.9       Past Medical History:        Diagnosis Date    Anxiety     Chronic UTI     COVID-19 vaccine administered     Fibromyalgia     Hashimoto's disease     Hematuria due to chronic cystitis     HTN (hypertension)     IBS (irritable bowel syndrome)     Major depressive disorder, single episode with anxious distress 10/08/2019    Obesity     Prediabetes     Renal cyst, right     Tachycardia     Unspecified sleep apnea     does not use machine       Past Surgical History:        Procedure Laterality Date    ABDOMEN SURGERY      abdominoplasty 2002    BLEPHAROPLASTY      BREAST REDUCTION SURGERY      CARDIAC CATHETERIZATION      clearing blockage w/ medication 80%    CHOLECYSTECTOMY      COLONOSCOPY  2013    10 yrs    CYST REMOVAL Bilateral     feet    FOOT TENDON SURGERY Left 12/02/2015    HYSTERECTOMY, TOTAL ABDOMINAL (CERVIX REMOVED)      with BSO    TONSILLECTOMY AND ADENOIDECTOMY         Social History:    Social History     Tobacco Use    Smoking status: Never    Smokeless tobacco: Never   Substance Use Topics    Alcohol use: No     Comment: rarely                                Counseling given: Not Answered      Vital Signs (Current):   Vitals:    04/06/23 0833   BP: (!) 156/93   Pulse: 81   Resp: 16   Temp: 97 °F (36.1 °C)   TempSrc: Temporal   SpO2: 97%                                              BP

## 2023-04-06 NOTE — ANESTHESIA POSTPROCEDURE EVALUATION
Department of Anesthesiology  Postprocedure Note    Patient: Deana Jacobson  MRN: [id number]  YOB: 1955  Date of evaluation: 4/6/2023      Procedure Summary     Date: 04/06/23 Room / Location: [address]    Anesthesia Start: 3871 Anesthesia Stop: 0920    Procedures:       *24HR BRAVO* EGD      EGD BIOPSY Diagnosis:       Gastroesophageal reflux disease without esophagitis      (GERD)    Surgeons: Michelle Rangel MD Responsible Provider: Lee Ann Patricia MD    Anesthesia Type: MAC ASA Status: 3          Anesthesia Type: No value filed.     Vee Phase I: Vee Score: 8    Vee Phase II:        Anesthesia Post Evaluation    Patient location during evaluation: bedside  Patient participation: complete - patient participated  Level of consciousness: awake and alert  Pain score: 0  Airway patency: patent  Nausea & Vomiting: no nausea and no vomiting  Complications: no  Cardiovascular status: blood pressure returned to baseline  Respiratory status: acceptable  Hydration status: euvolemic

## 2023-04-06 NOTE — PROGRESS NOTES
Discharge instructions reviewed with patient and spouse. All questions answered and both verbalize understanding.

## 2023-04-06 NOTE — H&P
History and Physical    Pt Name: Carlos Beck  MRN: [id number]  YOB: 1955  Date of evaluation: 4/6/2023  Primary Care Physician: Lou Bird MD    SUBJECTIVE:   History of Chief Complaint:    Carlos Beck is a 78 y.o. female who is scheduled today for *24HR BRAVO* EGD. Patient states she has had GERD for about 30 years. She states her prilosec has not been helping recently. Patient denies any history of abdominal pain, nausea or vomiting. Patient has a history of several EGD with dilations. Allergies  is allergic to contrast [iodides], bactrim [sulfamethoxazole-trimethoprim], sulfa antibiotics, and morphine. Medications  Prior to Admission medications    Medication Sig Start Date End Date Taking?  Authorizing Provider   escitalopram (LEXAPRO) 10 MG tablet TAKE ONE TABLET BY MOUTH ONE TIME DAILY 3/27/23   Amilcar Juárez MD   omeprazole (PRILOSEC) 40 MG delayed release capsule  3/6/23   Historical Provider, MD   losartan (COZAAR) 100 MG tablet TAKE ONE TABLET BY MOUTH ONE TIME DAILY 3/9/23   Amilcar Juárez MD   atenolol (TENORMIN) 50 MG tablet TAKE ONE TABLET BY MOUTH TWICE DAILY 3/9/23   Amilcar Juárez MD   diclofenac sodium (VOLTAREN) 1 % GEL Apply 2 g topically 4 times daily 6/17/22   Amilcar Juárez MD   diclofenac sodium (VOLTAREN) 1 % GEL Apply 4 g topically 4 times daily 6/16/22   Amilcar Juárez MD   ibuprofen (ADVIL;MOTRIN) 800 MG tablet TAKE 1 TABLET EVERY 6 HOURS AS NEEDED FOR PAIN 1/31/22   Amilcar Juárez MD   rosuvastatin (CRESTOR) 10 MG tablet Take 1 tablet by mouth daily 10/7/21   Historical Provider, MD     Past Medical History    has a past medical history of Anxiety, Chronic UTI, COVID-19 vaccine administered, Fibromyalgia, Hashimoto's disease, Hematuria due to chronic cystitis, HTN (hypertension), IBS (irritable bowel syndrome), Major depressive disorder, single episode with anxious distress, Obesity, Prediabetes, Renal cyst, right,

## 2023-04-07 LAB — SURGICAL PATHOLOGY REPORT: NORMAL

## 2023-05-01 LAB
AVERAGE GLUCOSE: 137
HBA1C MFR BLD: 6.4 %

## 2023-05-02 ENCOUNTER — PATIENT MESSAGE (OUTPATIENT)
Dept: INTERNAL MEDICINE CLINIC | Age: 68
End: 2023-05-02

## 2023-05-03 NOTE — TELEPHONE ENCOUNTER
From: Jade Caba  To: Dr. Arcadio Shone: 5/2/2023 2:26 PM EDT  Subject: Diabetes    Doctor Georgina Solis,  [address] [facility],  you are feeling better. I am headed for surgury on 5/15 at [facility] to remove that lesion from my right kidney. During the test for pre op, my A1C was at 6. Do I need to start meds for this? If so can i have the one that helps in weight loss:)  PLEASE!!   Israel Bullard

## 2023-05-16 ENCOUNTER — PATIENT MESSAGE (OUTPATIENT)
Dept: INTERNAL MEDICINE CLINIC | Age: 68
End: 2023-05-16

## 2023-05-16 NOTE — TELEPHONE ENCOUNTER
From: Mami Painter  To: Dr. Saad Whitley  Sent: 5/16/2023 1:38 PM EDT  Subject: Blood sugar    Doctor k  When u get a chance check out my blood sugar during and after my surgery. U asked me to do some charting prior to surgery. Morning when i wake up. Its been 145   After dinners 101  Let me know u thoughts.   Barbara Cruz

## 2023-05-30 ENCOUNTER — OFFICE VISIT (OUTPATIENT)
Dept: INTERNAL MEDICINE CLINIC | Age: 68
End: 2023-05-30

## 2023-05-30 ENCOUNTER — PATIENT MESSAGE (OUTPATIENT)
Dept: INTERNAL MEDICINE CLINIC | Age: 68
End: 2023-05-30

## 2023-05-30 VITALS
BODY MASS INDEX: 36.18 KG/M2 | WEIGHT: 204.2 LBS | SYSTOLIC BLOOD PRESSURE: 108 MMHG | RESPIRATION RATE: 16 BRPM | HEIGHT: 63 IN | DIASTOLIC BLOOD PRESSURE: 64 MMHG | OXYGEN SATURATION: 94 % | HEART RATE: 86 BPM | TEMPERATURE: 98.1 F

## 2023-05-30 DIAGNOSIS — Z90.5 H/O PARTIAL NEPHRECTOMY: Primary | ICD-10-CM

## 2023-05-30 DIAGNOSIS — I10 ESSENTIAL HYPERTENSION: ICD-10-CM

## 2023-05-30 DIAGNOSIS — C64.1 RENAL CELL CARCINOMA OF RIGHT KIDNEY (HCC): ICD-10-CM

## 2023-05-30 DIAGNOSIS — E11.9 TYPE 2 DIABETES MELLITUS WITHOUT COMPLICATION, WITHOUT LONG-TERM CURRENT USE OF INSULIN (HCC): ICD-10-CM

## 2023-05-30 SDOH — ECONOMIC STABILITY: FOOD INSECURITY: WITHIN THE PAST 12 MONTHS, YOU WORRIED THAT YOUR FOOD WOULD RUN OUT BEFORE YOU GOT MONEY TO BUY MORE.: NEVER TRUE

## 2023-05-30 SDOH — ECONOMIC STABILITY: HOUSING INSECURITY
IN THE LAST 12 MONTHS, WAS THERE A TIME WHEN YOU DID NOT HAVE A STEADY PLACE TO SLEEP OR SLEPT IN A SHELTER (INCLUDING NOW)?: PATIENT REFUSED

## 2023-05-30 SDOH — ECONOMIC STABILITY: FOOD INSECURITY: WITHIN THE PAST 12 MONTHS, THE FOOD YOU BOUGHT JUST DIDN'T LAST AND YOU DIDN'T HAVE MONEY TO GET MORE.: NEVER TRUE

## 2023-05-30 SDOH — ECONOMIC STABILITY: INCOME INSECURITY: HOW HARD IS IT FOR YOU TO PAY FOR THE VERY BASICS LIKE FOOD, HOUSING, MEDICAL CARE, AND HEATING?: NOT HARD AT ALL

## 2023-05-30 NOTE — PROGRESS NOTES
Ryan Doyle is a 78 y.o. female who presents for   Chief Complaint   Patient presents with    Follow-up     Pt reports she had rt partial nephrectomy on 5/15/23    Health Maintenance     Pt is due for dm foot exam and retinal exam    Immunizations     Pt is due for pneumo, shingles and dtap vaccinations. Pt notified and does not want any of them today. and follow up of chronic medical problems. Patient Active Problem List   Diagnosis    Tachycardia    HTN (hypertension)    Fibromyalgia    Chronic UTI    IBS (irritable bowel syndrome)    Obesity    Hypercholesterolemia    Chondromalacia patellae of right knee    Patellar contusion    Prediabetes    History of hysterectomy for benign disease    Vaginal atrophy    Major depressive disorder, single episode with anxious distress    Type 2 diabetes mellitus without complication, without long-term current use of insulin ([facility])     HPI  Here for follow-up after nephrectomy done last week and denies any new complaints and doing well    Current Outpatient Medications   Medication Sig Dispense Refill    IRON CR PO Take by mouth      Bisacodyl (DULCOLAX PINK LAXATIVE PO) Take by mouth      escitalopram (LEXAPRO) 10 MG tablet TAKE ONE TABLET BY MOUTH ONE TIME DAILY 90 tablet 0    omeprazole (PRILOSEC) 40 MG delayed release capsule       losartan (COZAAR) 100 MG tablet TAKE ONE TABLET BY MOUTH ONE TIME DAILY 90 tablet 0    atenolol (TENORMIN) 50 MG tablet TAKE ONE TABLET BY MOUTH TWICE DAILY 180 tablet 0    rosuvastatin (CRESTOR) 10 MG tablet Take 1 tablet by mouth daily       No current facility-administered medications for this visit.        Allergies   Allergen Reactions    Contrast [Iodides] Hives     Pt had hives during an IVP    Bactrim [Sulfamethoxazole-Trimethoprim] Hives    Sulfa Antibiotics     Morphine Nausea And Vomiting       Past Medical History:   Diagnosis Date    Anxiety     Chronic UTI     COVID-19 vaccine administered     Fibromyalgia     Hashimoto's

## 2023-05-31 NOTE — TELEPHONE ENCOUNTER
From: Ben Judge  To: Dr. Jenise Mckinnon: 5/30/2023 12:27 PM EDT  Subject: Handicap plaquard    Can you give me a script for a placquard? I forgot to ask uDinesh

## 2023-06-05 RX ORDER — ATENOLOL 50 MG/1
TABLET ORAL
Qty: 180 TABLET | Refills: 1 | Status: SHIPPED | OUTPATIENT
Start: 2023-06-05

## 2023-06-05 RX ORDER — LOSARTAN POTASSIUM 100 MG/1
TABLET ORAL
Qty: 90 TABLET | Refills: 1 | Status: SHIPPED | OUTPATIENT
Start: 2023-06-05

## 2023-06-05 NOTE — TELEPHONE ENCOUNTER
Jenn Moore is calling to request a refill on the following medication(s):    Medication Request:  Requested Prescriptions     Pending Prescriptions Disp Refills    losartan (COZAAR) 100 MG tablet [Pharmacy Med Name: Losartan Potassium Oral Tablet 100 MG] 90 tablet 0     Sig: TAKE ONE TABLET BY MOUTH ONE TIME DAILY    atenolol (TENORMIN) 50 MG tablet [Pharmacy Med Name: Atenolol Oral Tablet 50 MG] 180 tablet 0     Sig: TAKE ONE TABLET BY MOUTH TWICE DAILY       Last Visit Date (If Applicable):  8/39/8790    Next Visit Date:    9/14/2023    Last refill 3/9/23. Prescription pending.

## 2023-06-29 RX ORDER — ESCITALOPRAM OXALATE 10 MG/1
TABLET ORAL
Qty: 90 TABLET | Refills: 0 | Status: SHIPPED | OUTPATIENT
Start: 2023-06-29

## 2023-06-30 DIAGNOSIS — I10 ESSENTIAL HYPERTENSION: ICD-10-CM

## 2023-06-30 DIAGNOSIS — E11.9 TYPE 2 DIABETES MELLITUS WITHOUT COMPLICATION, WITHOUT LONG-TERM CURRENT USE OF INSULIN (HCC): ICD-10-CM

## 2023-07-01 DIAGNOSIS — E11.9 TYPE 2 DIABETES MELLITUS WITHOUT COMPLICATION, WITHOUT LONG-TERM CURRENT USE OF INSULIN (HCC): ICD-10-CM

## 2023-07-03 RX ORDER — BLOOD SUGAR DIAGNOSTIC
STRIP MISCELLANEOUS
Qty: 100 STRIP | Refills: 1 | Status: SHIPPED | OUTPATIENT
Start: 2023-07-03

## 2023-07-03 NOTE — TELEPHONE ENCOUNTER
Oskar Reyes is calling to request a refill on the following medication(s):    Medication Request:  Requested Prescriptions     Pending Prescriptions Disp Refills    blood glucose test strips (ONETOUCH VERIO) strip [Pharmacy Med Name: Hussain Sibley In Vitro Strip] 100 strip 1     Sig: Use two times a day       Last Visit Date (If Applicable):  6/18/6269    Next Visit Date:    9/14/2023    Last refill 6/5/18. Prescription pending.

## 2023-07-07 RX ORDER — ESTRADIOL 10 UG/1
INSERT VAGINAL
Qty: 90 TABLET | Refills: 3 | Status: SHIPPED | OUTPATIENT
Start: 2023-07-07

## 2023-07-10 ENCOUNTER — HOSPITAL ENCOUNTER (OUTPATIENT)
Facility: CLINIC | Age: 68
Discharge: HOME | End: 2023-07-10
Payer: COMMERCIAL

## 2023-07-10 LAB
CHOLEST SERPL-MCNC: 136 MG/DL
CHOLESTEROL/HDL RATIO: 3.3
HDLC SERPL-MCNC: 41 MG/DL
LDLC SERPL CALC-MCNC: 61 MG/DL (ref 0–130)
TRIGL SERPL-MCNC: 168 MG/DL

## 2023-07-10 PROCEDURE — 36415 COLL VENOUS BLD VENIPUNCTURE: CPT

## 2023-07-10 PROCEDURE — 80061 LIPID PANEL: CPT

## 2023-07-10 PROCEDURE — 80053 COMPREHEN METABOLIC PANEL: CPT

## 2023-07-10 PROCEDURE — 82550 ASSAY OF CK (CPK): CPT

## 2023-07-11 LAB
ALBUMIN SERPL-MCNC: 4.5 G/DL (ref 3.5–5.2)
ALBUMIN/GLOB SERPL: 1.5 {RATIO} (ref 1–2.5)
ALP SERPL-CCNC: 59 U/L (ref 35–104)
ALT SERPL-CCNC: 18 U/L (ref 5–33)
ANION GAP SERPL CALCULATED.3IONS-SCNC: 13 MMOL/L (ref 9–17)
AST SERPL-CCNC: 19 U/L
BILIRUB SERPL-MCNC: 0.5 MG/DL (ref 0.3–1.2)
BUN SERPL-MCNC: 21 MG/DL (ref 8–23)
CALCIUM SERPL-MCNC: 10 MG/DL (ref 8.6–10.4)
CHLORIDE SERPL-SCNC: 106 MMOL/L (ref 98–107)
CK SERPL-CCNC: 31 U/L (ref 26–192)
CO2 SERPL-SCNC: 23 MMOL/L (ref 20–31)
CREAT SERPL-MCNC: 1.2 MG/DL (ref 0.5–0.9)
GFR SERPL CREATININE-BSD FRML MDRD: 50 ML/MIN/1.73M2
GLUCOSE P FAST SERPL-MCNC: 116 MG/DL (ref 70–99)
POTASSIUM SERPL-SCNC: 4.8 MMOL/L (ref 3.7–5.3)
PROT SERPL-MCNC: 7.6 G/DL (ref 6.4–8.3)
SODIUM SERPL-SCNC: 142 MMOL/L (ref 135–144)

## 2023-07-14 ENCOUNTER — TELEPHONE (OUTPATIENT)
Dept: OTHER | Age: 68
End: 2023-07-14

## 2023-07-14 ENCOUNTER — TELEPHONE (OUTPATIENT)
Dept: INTERNAL MEDICINE CLINIC | Age: 68
End: 2023-07-14

## 2023-07-14 RX ORDER — CEFUROXIME AXETIL 500 MG/1
500 TABLET ORAL 2 TIMES DAILY
Qty: 20 TABLET | Refills: 0 | Status: SHIPPED | OUTPATIENT
Start: 2023-07-14 | End: 2023-07-24

## 2023-07-14 NOTE — TELEPHONE ENCOUNTER
Patient had called the office today and asking for antibiotics for a UTI. Patient states she had blood in her urine. No medications have been ordered. Caller transferred to the on-call Dr. Lydia Cardenas.

## 2023-08-01 ENCOUNTER — OFFICE VISIT (OUTPATIENT)
Dept: INTERNAL MEDICINE CLINIC | Age: 68
End: 2023-08-01
Payer: COMMERCIAL

## 2023-08-01 VITALS
RESPIRATION RATE: 12 BRPM | TEMPERATURE: 98.1 F | WEIGHT: 212 LBS | OXYGEN SATURATION: 97 % | HEIGHT: 63 IN | SYSTOLIC BLOOD PRESSURE: 120 MMHG | HEART RATE: 85 BPM | DIASTOLIC BLOOD PRESSURE: 76 MMHG | BODY MASS INDEX: 37.56 KG/M2

## 2023-08-01 DIAGNOSIS — N18.2 STAGE 2 CHRONIC KIDNEY DISEASE: ICD-10-CM

## 2023-08-01 DIAGNOSIS — M25.541 ARTHRALGIA OF BOTH HANDS: Primary | ICD-10-CM

## 2023-08-01 DIAGNOSIS — M25.542 ARTHRALGIA OF BOTH HANDS: Primary | ICD-10-CM

## 2023-08-01 DIAGNOSIS — I10 ESSENTIAL HYPERTENSION: ICD-10-CM

## 2023-08-01 PROCEDURE — 1123F ACP DISCUSS/DSCN MKR DOCD: CPT | Performed by: INTERNAL MEDICINE

## 2023-08-01 PROCEDURE — 99214 OFFICE O/P EST MOD 30 MIN: CPT | Performed by: INTERNAL MEDICINE

## 2023-08-01 PROCEDURE — 3074F SYST BP LT 130 MM HG: CPT | Performed by: INTERNAL MEDICINE

## 2023-08-01 PROCEDURE — 3078F DIAST BP <80 MM HG: CPT | Performed by: INTERNAL MEDICINE

## 2023-08-08 ENCOUNTER — PATIENT MESSAGE (OUTPATIENT)
Dept: INTERNAL MEDICINE CLINIC | Age: 68
End: 2023-08-08

## 2023-08-08 DIAGNOSIS — M25.541 ARTHRALGIA OF BOTH HANDS: Primary | ICD-10-CM

## 2023-08-08 DIAGNOSIS — M25.542 ARTHRALGIA OF BOTH HANDS: Primary | ICD-10-CM

## 2023-08-08 NOTE — TELEPHONE ENCOUNTER
From: Gordon Sanchez  To: Dr. Renetta Go  Sent: 8/8/2023 12:31 PM EDT  Subject: Painful Hands    Dr. Schaefer Seek  Can you recommend a hand specialist?  The pain is getting worse.   Thank u

## 2023-09-08 ENCOUNTER — PATIENT MESSAGE (OUTPATIENT)
Dept: INTERNAL MEDICINE CLINIC | Age: 68
End: 2023-09-08

## 2023-09-08 NOTE — TELEPHONE ENCOUNTER
From: Berto Albarado  To: Dr. Wadsworth Stable: 9/8/2023 8:13 AM EDT  Subject: Diabetes Meds    Doctor K  This was my second severe uti since surgery. My urologist is questioning the diabetes meds i am on. Does it cause utis?

## 2023-09-17 ENCOUNTER — PATIENT MESSAGE (OUTPATIENT)
Dept: INTERNAL MEDICINE CLINIC | Age: 68
End: 2023-09-17

## 2023-09-18 RX ORDER — MECLIZINE HCL 12.5 MG/1
12.5 TABLET ORAL 3 TIMES DAILY PRN
Qty: 30 TABLET | Refills: 0 | Status: SHIPPED | OUTPATIENT
Start: 2023-09-18 | End: 2023-09-28

## 2023-09-18 NOTE — TELEPHONE ENCOUNTER
From: Nguyen Obrien  To: Dr. Tanisha Yeh  Sent: 9/17/2023 6:28 PM EDT  Subject: ER    Dr. Samy Webb went to [facility] today. They did a bunch of tests. Cruz Howe all were good. Still very dizzy. They did not address the dizziness. Can u check this out?

## 2023-09-25 RX ORDER — ESCITALOPRAM OXALATE 10 MG/1
TABLET ORAL
Qty: 90 TABLET | Refills: 1 | Status: SHIPPED | OUTPATIENT
Start: 2023-09-25

## 2023-09-25 NOTE — TELEPHONE ENCOUNTER
Citlali Denny is calling to request a refill on the following medication(s):    Medication Request:  Requested Prescriptions     Pending Prescriptions Disp Refills    escitalopram (LEXAPRO) 10 MG tablet [Pharmacy Med Name: Escitalopram Oxalate Oral Tablet 10 MG] 90 tablet 0     Sig: TAKE ONE TABLET BY MOUTH ONE TIME DAILY       Last Visit Date (If Applicable):  8/4/4758    Next Visit Date:    11/1/2023    Last refill 6/29/23. Prescription pending.

## 2023-10-05 ENCOUNTER — HOSPITAL ENCOUNTER (OUTPATIENT)
Facility: CLINIC | Age: 68
Discharge: HOME | End: 2023-10-05
Payer: COMMERCIAL

## 2023-10-05 DIAGNOSIS — M25.542 ARTHRALGIA OF BOTH HANDS: ICD-10-CM

## 2023-10-05 DIAGNOSIS — M25.541 ARTHRALGIA OF BOTH HANDS: ICD-10-CM

## 2023-10-05 LAB
BACTERIA URNS QL MICRO: ABNORMAL
BILIRUB UR QL STRIP: NEGATIVE
CASTS #/AREA URNS LPF: ABNORMAL /LPF (ref 0–8)
CLARITY UR: CLEAR
COLOR UR: YELLOW
EPI CELLS #/AREA URNS HPF: ABNORMAL /HPF (ref 0–5)
ERYTHROCYTE [SEDIMENTATION RATE] IN BLOOD BY PHOTOMETRIC METHOD: 4 MM/HR (ref 0–30)
EST. AVERAGE GLUCOSE BLD GHB EST-MCNC: 114 MG/DL
GLUCOSE UR STRIP-MCNC: NEGATIVE MG/DL
HBA1C MFR BLD: 5.6 % (ref 4–6)
HGB UR QL STRIP.AUTO: ABNORMAL
KETONES UR STRIP-MCNC: NEGATIVE MG/DL
LEUKOCYTE ESTERASE UR QL STRIP: ABNORMAL
NITRITE UR QL STRIP: NEGATIVE
PH UR STRIP: 5.5 [PH] (ref 5–8)
PROT UR STRIP-MCNC: ABNORMAL MG/DL
RBC #/AREA URNS HPF: ABNORMAL /HPF (ref 0–4)
RHEUMATOID FACT SER NEPH-ACNC: <10 IU/ML
SP GR UR STRIP: 1.01 (ref 1–1.03)
URATE SERPL-MCNC: 6.1 MG/DL (ref 2.4–5.7)
UROBILINOGEN UR STRIP-ACNC: NORMAL EU/DL (ref 0–1)
WBC #/AREA URNS HPF: ABNORMAL /HPF (ref 0–5)

## 2023-10-05 PROCEDURE — 84550 ASSAY OF BLOOD/URIC ACID: CPT

## 2023-10-05 PROCEDURE — 86225 DNA ANTIBODY NATIVE: CPT

## 2023-10-05 PROCEDURE — 87186 SC STD MICRODIL/AGAR DIL: CPT

## 2023-10-05 PROCEDURE — 83036 HEMOGLOBIN GLYCOSYLATED A1C: CPT

## 2023-10-05 PROCEDURE — 36415 COLL VENOUS BLD VENIPUNCTURE: CPT

## 2023-10-05 PROCEDURE — 81001 URINALYSIS AUTO W/SCOPE: CPT

## 2023-10-05 PROCEDURE — 85652 RBC SED RATE AUTOMATED: CPT

## 2023-10-05 PROCEDURE — 86431 RHEUMATOID FACTOR QUANT: CPT

## 2023-10-05 PROCEDURE — 87088 URINE BACTERIA CULTURE: CPT

## 2023-10-05 PROCEDURE — 86038 ANTINUCLEAR ANTIBODIES: CPT

## 2023-10-05 PROCEDURE — 87086 URINE CULTURE/COLONY COUNT: CPT

## 2023-10-06 LAB
MICROORGANISM SPEC CULT: ABNORMAL
SPECIMEN DESCRIPTION: ABNORMAL

## 2023-10-06 RX ORDER — CEPHALEXIN 500 MG/1
500 CAPSULE ORAL 3 TIMES DAILY
Qty: 21 CAPSULE | Refills: 0 | Status: SHIPPED | OUTPATIENT
Start: 2023-10-06 | End: 2023-10-13

## 2023-10-11 LAB
ANA SER QL IA: NEGATIVE
DSDNA IGG SER QL IA: 1.2 IU/ML
NUCLEAR IGG SER IA-RTO: 0.5 U/ML

## 2023-12-01 RX ORDER — LOSARTAN POTASSIUM 100 MG/1
100 TABLET ORAL DAILY
Qty: 90 TABLET | Refills: 1 | Status: SHIPPED | OUTPATIENT
Start: 2023-12-01

## 2023-12-01 RX ORDER — ATENOLOL 50 MG/1
TABLET ORAL
Qty: 180 TABLET | Refills: 1 | Status: SHIPPED | OUTPATIENT
Start: 2023-12-01

## 2023-12-01 NOTE — TELEPHONE ENCOUNTER
Kirt Dumont is calling to request a refill on the following medication(s):    Medication Request:  Requested Prescriptions     Pending Prescriptions Disp Refills    atenolol (TENORMIN) 50 MG tablet [Pharmacy Med Name: Atenolol Oral Tablet 50 MG] 180 tablet 0     Sig: TAKE ONE TABLET BY MOUTH TWICE DAILY    losartan (COZAAR) 100 MG tablet [Pharmacy Med Name: Losartan Potassium Oral Tablet 100 MG] 90 tablet 0     Sig: TAKE 1 TABLET BY MOUTH ONCE DAILY       Last Visit Date (If Applicable):  3/0/7902    Next Visit Date:    1/4/2024    Last refills 6/5/23. Prescriptions pending.

## 2024-01-03 ASSESSMENT — PATIENT HEALTH QUESTIONNAIRE - PHQ9
SUM OF ALL RESPONSES TO PHQ QUESTIONS 1-9: 1
4. FEELING TIRED OR HAVING LITTLE ENERGY: 0
2. FEELING DOWN, DEPRESSED OR HOPELESS: NOT AT ALL
5. POOR APPETITE OR OVEREATING: NOT AT ALL
2. FEELING DOWN, DEPRESSED OR HOPELESS: 0
8. MOVING OR SPEAKING SO SLOWLY THAT OTHER PEOPLE COULD HAVE NOTICED. OR THE OPPOSITE - BEING SO FIDGETY OR RESTLESS THAT YOU HAVE BEEN MOVING AROUND A LOT MORE THAN USUAL: NOT AT ALL
SUM OF ALL RESPONSES TO PHQ QUESTIONS 1-9: 1
4. FEELING TIRED OR HAVING LITTLE ENERGY: NOT AT ALL
SUM OF ALL RESPONSES TO PHQ QUESTIONS 1-9: 1
5. POOR APPETITE OR OVEREATING: 0
SUM OF ALL RESPONSES TO PHQ QUESTIONS 1-9: 1
6. FEELING BAD ABOUT YOURSELF - OR THAT YOU ARE A FAILURE OR HAVE LET YOURSELF OR YOUR FAMILY DOWN: NOT AT ALL
9. THOUGHTS THAT YOU WOULD BE BETTER OFF DEAD, OR OF HURTING YOURSELF: NOT AT ALL
7. TROUBLE CONCENTRATING ON THINGS, SUCH AS READING THE NEWSPAPER OR WATCHING TELEVISION: 0
SUM OF ALL RESPONSES TO PHQ QUESTIONS 1-9: 1
1. LITTLE INTEREST OR PLEASURE IN DOING THINGS: NOT AT ALL
1. LITTLE INTEREST OR PLEASURE IN DOING THINGS: 0
9. THOUGHTS THAT YOU WOULD BE BETTER OFF DEAD, OR OF HURTING YOURSELF: 0
6. FEELING BAD ABOUT YOURSELF - OR THAT YOU ARE A FAILURE OR HAVE LET YOURSELF OR YOUR FAMILY DOWN: 0
10. IF YOU CHECKED OFF ANY PROBLEMS, HOW DIFFICULT HAVE THESE PROBLEMS MADE IT FOR YOU TO DO YOUR WORK, TAKE CARE OF THINGS AT HOME, OR GET ALONG WITH OTHER PEOPLE: NOT DIFFICULT AT ALL
8. MOVING OR SPEAKING SO SLOWLY THAT OTHER PEOPLE COULD HAVE NOTICED. OR THE OPPOSITE, BEING SO FIGETY OR RESTLESS THAT YOU HAVE BEEN MOVING AROUND A LOT MORE THAN USUAL: 0
10. IF YOU CHECKED OFF ANY PROBLEMS, HOW DIFFICULT HAVE THESE PROBLEMS MADE IT FOR YOU TO DO YOUR WORK, TAKE CARE OF THINGS AT HOME, OR GET ALONG WITH OTHER PEOPLE: 0
SUM OF ALL RESPONSES TO PHQ9 QUESTIONS 1 & 2: 0
7. TROUBLE CONCENTRATING ON THINGS, SUCH AS READING THE NEWSPAPER OR WATCHING TELEVISION: NOT AT ALL
3. TROUBLE FALLING OR STAYING ASLEEP: SEVERAL DAYS
3. TROUBLE FALLING OR STAYING ASLEEP: 1

## 2024-01-10 ENCOUNTER — OFFICE VISIT (OUTPATIENT)
Dept: INTERNAL MEDICINE CLINIC | Age: 69
End: 2024-01-10
Payer: COMMERCIAL

## 2024-01-10 VITALS
HEART RATE: 89 BPM | RESPIRATION RATE: 11 BRPM | BODY MASS INDEX: 36.99 KG/M2 | SYSTOLIC BLOOD PRESSURE: 132 MMHG | OXYGEN SATURATION: 99 % | DIASTOLIC BLOOD PRESSURE: 74 MMHG | WEIGHT: 208.8 LBS | TEMPERATURE: 97.9 F

## 2024-01-10 DIAGNOSIS — G47.30 SLEEP APNEA, UNSPECIFIED TYPE: Primary | ICD-10-CM

## 2024-01-10 DIAGNOSIS — C64.1 RENAL CELL CARCINOMA OF RIGHT KIDNEY (HCC): ICD-10-CM

## 2024-01-10 DIAGNOSIS — M79.646 THUMB PAIN, UNSPECIFIED LATERALITY: ICD-10-CM

## 2024-01-10 DIAGNOSIS — E11.9 TYPE 2 DIABETES MELLITUS WITHOUT COMPLICATION, WITHOUT LONG-TERM CURRENT USE OF INSULIN (HCC): ICD-10-CM

## 2024-01-10 DIAGNOSIS — E66.01 SEVERE OBESITY (BMI 35.0-39.9) WITH COMORBIDITY (HCC): ICD-10-CM

## 2024-01-10 PROCEDURE — 3075F SYST BP GE 130 - 139MM HG: CPT | Performed by: INTERNAL MEDICINE

## 2024-01-10 PROCEDURE — 99214 OFFICE O/P EST MOD 30 MIN: CPT | Performed by: INTERNAL MEDICINE

## 2024-01-10 PROCEDURE — 1123F ACP DISCUSS/DSCN MKR DOCD: CPT | Performed by: INTERNAL MEDICINE

## 2024-01-10 PROCEDURE — 3078F DIAST BP <80 MM HG: CPT | Performed by: INTERNAL MEDICINE

## 2024-01-10 NOTE — PROGRESS NOTES
stopped using her sleep apnea machine and need to be reevaluated and so his sleep study was ordered  Also labs ordered to check for hemoglobin A1c and microalbumin  Patient's visit to the gastroenterologist and had esophageal stenosis and was stretched and dilated and patient taking omeprazole 40 mg once a day  Patient's visit to the urologist follow-up on her renal cell carcinoma and MRI follow-up was done which was negative other than scar tissue  Review in 6 months           1.  Kodak received counseling on the following healthy behaviors: nutrition and exercise    2. Prior labs and health maintenance reviewed.     3.  Discussed use, benefit, and side effects of prescribed medications.  Barriers to medication compliance addressed.  All her questions were answered.  Pt voiced understanding.   Kodak will continue current medications, diet and exercise.              Orders Placed This Encounter   Medications    Elastic Bandages & Supports (WRIST BRACE ULTRA-LITE) MISC     Sig: Use as directed     Dispense:  2 each     Refill:  0          Completed Refills               Requested Prescriptions     Signed Prescriptions Disp Refills    Elastic Bandages & Supports (WRIST BRACE ULTRA-LITE) MISC 2 each 0     Sig: Use as directed     4. Patient given educational materials - see patient instructions    5. Was a self-tracking handout given in paper form or via [employer]?  NO    Orders Placed This Encounter   Procedures    Hemoglobin A1C     Standing Status:   Future     Standing Expiration Date:   1/9/2025    Microalbumin / Creatinine Urine Ratio     Standing Status:   Future     Standing Expiration Date:   1/9/2025    Magnesium     Standing Status:   Future     Standing Expiration Date:   1/9/2025    Vitamin B12     Standing Status:   Future     Standing Expiration Date:   1/9/2025    TSH     Standing Status:   Future     Standing Expiration Date:   1/9/2025    Brent Montemayor DO, Orthopaedic Surgery, [place]

## 2024-01-15 SDOH — HEALTH STABILITY: PHYSICAL HEALTH: ON AVERAGE, HOW MANY DAYS PER WEEK DO YOU ENGAGE IN MODERATE TO STRENUOUS EXERCISE (LIKE A BRISK WALK)?: 0 DAYS

## 2024-01-15 SDOH — HEALTH STABILITY: PHYSICAL HEALTH: ON AVERAGE, HOW MANY MINUTES DO YOU ENGAGE IN EXERCISE AT THIS LEVEL?: 20 MIN

## 2024-01-16 ENCOUNTER — OFFICE VISIT (OUTPATIENT)
Dept: ORTHOPEDIC SURGERY | Age: 69
End: 2024-01-16

## 2024-01-16 VITALS — WEIGHT: 214 LBS | OXYGEN SATURATION: 100 % | RESPIRATION RATE: 15 BRPM | BODY MASS INDEX: 37.92 KG/M2 | HEIGHT: 63 IN

## 2024-01-16 DIAGNOSIS — M77.8 RIGHT WRIST TENDINITIS: ICD-10-CM

## 2024-01-16 DIAGNOSIS — M79.644 THUMB PAIN, RIGHT: Primary | ICD-10-CM

## 2024-01-16 DIAGNOSIS — M65.4 DE QUERVAIN'S DISEASE (RADIAL STYLOID TENOSYNOVITIS): ICD-10-CM

## 2024-01-16 RX ORDER — LIDOCAINE HYDROCHLORIDE 10 MG/ML
1 INJECTION, SOLUTION INFILTRATION; PERINEURAL ONCE
Status: CANCELLED | OUTPATIENT
Start: 2024-01-16 | End: 2024-01-16

## 2024-01-16 RX ORDER — BUPIVACAINE HYDROCHLORIDE 2.5 MG/ML
0.5 INJECTION, SOLUTION INFILTRATION; PERINEURAL ONCE
Status: COMPLETED | OUTPATIENT
Start: 2024-01-16 | End: 2024-01-16

## 2024-01-16 RX ORDER — BETAMETHASONE SODIUM PHOSPHATE AND BETAMETHASONE ACETATE 3; 3 MG/ML; MG/ML
6 INJECTION, SUSPENSION INTRA-ARTICULAR; INTRALESIONAL; INTRAMUSCULAR; SOFT TISSUE ONCE
Status: COMPLETED | OUTPATIENT
Start: 2024-01-16 | End: 2024-01-16

## 2024-01-16 RX ORDER — METHYLPREDNISOLONE ACETATE 80 MG/ML
80 INJECTION, SUSPENSION INTRA-ARTICULAR; INTRALESIONAL; INTRAMUSCULAR; SOFT TISSUE ONCE
Status: CANCELLED | OUTPATIENT
Start: 2024-01-16 | End: 2024-01-16

## 2024-01-16 RX ORDER — LIDOCAINE HYDROCHLORIDE 10 MG/ML
0.5 INJECTION, SOLUTION INFILTRATION; PERINEURAL ONCE
Status: COMPLETED | OUTPATIENT
Start: 2024-01-16 | End: 2024-01-16

## 2024-01-16 RX ADMIN — BUPIVACAINE HYDROCHLORIDE 1.25 MG: 2.5 INJECTION, SOLUTION INFILTRATION; PERINEURAL at 11:14

## 2024-01-16 RX ADMIN — LIDOCAINE HYDROCHLORIDE 0.5 ML: 10 INJECTION, SOLUTION INFILTRATION; PERINEURAL at 11:15

## 2024-01-16 RX ADMIN — BETAMETHASONE SODIUM PHOSPHATE AND BETAMETHASONE ACETATE 6 MG: 3; 3 INJECTION, SUSPENSION INTRA-ARTICULAR; INTRALESIONAL; INTRAMUSCULAR; SOFT TISSUE at 11:14

## 2024-01-16 ASSESSMENT — ENCOUNTER SYMPTOMS
CHEST TIGHTNESS: 0
SHORTNESS OF BREATH: 0
GASTROINTESTINAL NEGATIVE: 1
CONSTIPATION: 0
NAUSEA: 0
COLOR CHANGE: 0
COUGH: 0
VOMITING: 0
DIARRHEA: 0
RESPIRATORY NEGATIVE: 1
APNEA: 0
ABDOMINAL DISTENTION: 0
ABDOMINAL PAIN: 0

## 2024-01-16 NOTE — PATIENT INSTRUCTIONS
CORTISONE INJECTION CARE    The injection site should never get red, hot, or swollen and if it does the patient will contact our office right away. The patient may experience a increase in soreness the first 24-48 hours due to a cortisone flair and can take anti-inflammatories for a short period of time to reduce that soreness. The patient should not submerge the injection site in water for a minimum of 24 hours to avoid infection. This means no lakes, pools, ponds, or hot tubs for 24 hours. If the patient is diabetic the injection may increase their blood sugar for up to one week. The patient can do this cortisone injection once every 4 months as needed.     [FreeTextEntry1] : Ashley 57yo lady with no prior cardiac hx; here for evaluation of chest pain.\par Not associated with dyspnea/palpitations/syncope.\par EKG sinus 94bpm; IRBBB

## 2024-01-16 NOTE — PROGRESS NOTES
[facility] ORTHOPEDICS AND SPORTS MEDICINE  [address]  [place] [address]  Dept: [phone]  Dept Fax: [phone]        Right Hand & Wrist   New Patient    Subjective:     Chief Complaint   Patient presents with    Hand Pain     Right thumb pain    Wrist Pain     Right wrist pain     HPI:     Kodak Arnett presents with a 1 month history of right thumb.  The patient is right hand dominant. She has pain with doing her hair, wiping herself, lifting something out of the refrigerator. She has tried tylenol, brace, heat, topical anti-inflammatory. She states the pain is different than the triggering she has had before.  Patient did see Dr. Devin Ely at [facility] and was diagnosed with prodrome for trigger finger and opted to just do conservative treatments.  She was offered a home exercises, formal physical therapy, cortisone injection, Medrol Dosepak.  She chose to do the home exercise program and follow-up if she wanted to pursue other conservative management.  Patient avoids all NSAIDs due to having renal cell carcinoma and trying to protect her kidneys.    ROS:     Review of Systems   Constitutional:  Positive for activity change. Negative for appetite change, fatigue and fever.   Respiratory: Negative.  Negative for apnea, cough, chest tightness and shortness of breath.    Cardiovascular: Negative.  Negative for chest pain, palpitations and leg swelling.   Gastrointestinal: Negative.  Negative for abdominal distention, abdominal pain, constipation, diarrhea, nausea and vomiting.   Genitourinary: Negative.  Negative for difficulty urinating, dysuria and hematuria.   Musculoskeletal:  Positive for arthralgias and joint swelling. Negative for gait problem and myalgias.   Skin: Negative.  Negative for color change and rash.   Neurological: Negative.  Negative for dizziness, weakness, numbness and headaches.

## 2024-02-03 ENCOUNTER — PATIENT MESSAGE (OUTPATIENT)
Dept: INTERNAL MEDICINE CLINIC | Age: 69
End: 2024-02-03

## 2024-02-03 DIAGNOSIS — N39.0 CHRONIC UTI: Primary | ICD-10-CM

## 2024-02-05 NOTE — TELEPHONE ENCOUNTER
From: Kodak Arnett  To: Dr. Eris Almodovar  Sent: 2/3/2024 3:28 PM EST  Subject: Uti    I no longer need the paperwork for the sleep study.  I did call on Friday spoke to the person that answers when the office is closed.   I was wanting a urine test sent to lab..the one u sent .   Can u send another one plz.  Thanks

## 2024-02-14 ENCOUNTER — HOSPITAL ENCOUNTER (OUTPATIENT)
Facility: CLINIC | Age: 69
Discharge: HOME | End: 2024-02-14
Payer: COMMERCIAL

## 2024-02-14 ENCOUNTER — HOSPITAL ENCOUNTER (OUTPATIENT)
Facility: CLINIC | Age: 69
Setting detail: SPECIMEN
Discharge: HOME | End: 2024-02-14
Payer: COMMERCIAL

## 2024-02-14 DIAGNOSIS — M79.646 THUMB PAIN, UNSPECIFIED LATERALITY: ICD-10-CM

## 2024-02-14 DIAGNOSIS — G47.30 SLEEP APNEA, UNSPECIFIED TYPE: ICD-10-CM

## 2024-02-14 DIAGNOSIS — E11.9 TYPE 2 DIABETES MELLITUS WITHOUT COMPLICATION, WITHOUT LONG-TERM CURRENT USE OF INSULIN (HCC): ICD-10-CM

## 2024-02-14 DIAGNOSIS — C64.1 RENAL CELL CARCINOMA OF RIGHT KIDNEY (HCC): ICD-10-CM

## 2024-02-14 DIAGNOSIS — E66.01 SEVERE OBESITY (BMI 35.0-39.9) WITH COMORBIDITY (HCC): ICD-10-CM

## 2024-02-14 LAB
BACTERIA URNS QL MICRO: NORMAL
BILIRUB UR QL STRIP: NEGATIVE
CASTS #/AREA URNS LPF: NORMAL /LPF (ref 0–8)
CLARITY UR: CLEAR
COLOR UR: YELLOW
CREAT UR-MCNC: 58.4 MG/DL (ref 28–217)
EPI CELLS #/AREA URNS HPF: NORMAL /HPF (ref 0–5)
GLUCOSE UR STRIP-MCNC: NEGATIVE MG/DL
HGB UR QL STRIP.AUTO: ABNORMAL
KETONES UR STRIP-MCNC: NEGATIVE MG/DL
LEUKOCYTE ESTERASE UR QL STRIP: ABNORMAL
MAGNESIUM SERPL-MCNC: 2 MG/DL (ref 1.6–2.6)
MICROALBUMIN UR-MCNC: 328 MG/L
MICROALBUMIN/CREAT UR-RTO: 562 MCG/MG CREAT
NITRITE UR QL STRIP: NEGATIVE
PH UR STRIP: 5.5 [PH] (ref 5–8)
PROT UR STRIP-MCNC: ABNORMAL MG/DL
RBC #/AREA URNS HPF: NORMAL /HPF (ref 0–4)
SP GR UR STRIP: 1.01 (ref 1–1.03)
TSH SERPL DL<=0.05 MIU/L-ACNC: 0.7 UIU/ML (ref 0.3–5)
UROBILINOGEN UR STRIP-ACNC: NORMAL EU/DL (ref 0–1)
VIT B12 SERPL-MCNC: 513 PG/ML (ref 232–1245)
WBC #/AREA URNS HPF: NORMAL /HPF (ref 0–5)

## 2024-02-14 PROCEDURE — 82043 UR ALBUMIN QUANTITATIVE: CPT

## 2024-02-14 PROCEDURE — 80061 LIPID PANEL: CPT

## 2024-02-14 PROCEDURE — 81001 URINALYSIS AUTO W/SCOPE: CPT

## 2024-02-14 PROCEDURE — 82570 ASSAY OF URINE CREATININE: CPT

## 2024-02-14 PROCEDURE — 83036 HEMOGLOBIN GLYCOSYLATED A1C: CPT

## 2024-02-14 PROCEDURE — 80053 COMPREHEN METABOLIC PANEL: CPT

## 2024-02-14 PROCEDURE — 82550 ASSAY OF CK (CPK): CPT

## 2024-02-14 PROCEDURE — 36415 COLL VENOUS BLD VENIPUNCTURE: CPT

## 2024-02-14 PROCEDURE — 84443 ASSAY THYROID STIM HORMONE: CPT

## 2024-02-14 PROCEDURE — 83735 ASSAY OF MAGNESIUM: CPT

## 2024-02-14 PROCEDURE — 82607 VITAMIN B-12: CPT

## 2024-02-15 ENCOUNTER — TELEPHONE (OUTPATIENT)
Dept: INTERNAL MEDICINE CLINIC | Age: 69
End: 2024-02-15

## 2024-02-15 DIAGNOSIS — G47.30 SLEEP APNEA, UNSPECIFIED TYPE: Primary | ICD-10-CM

## 2024-02-15 LAB
EST. AVERAGE GLUCOSE BLD GHB EST-MCNC: 117 MG/DL
HBA1C MFR BLD: 5.7 % (ref 4–6)

## 2024-02-16 LAB
ALBUMIN SERPL-MCNC: 4.4 G/DL (ref 3.5–5.2)
ALBUMIN/GLOB SERPL: 1.5 {RATIO} (ref 1–2.5)
ALP SERPL-CCNC: 66 U/L (ref 35–104)
ALT SERPL-CCNC: 22 U/L (ref 5–33)
ANION GAP SERPL CALCULATED.3IONS-SCNC: 14 MMOL/L (ref 9–17)
AST SERPL-CCNC: 22 U/L
BILIRUB SERPL-MCNC: 0.5 MG/DL (ref 0.3–1.2)
BUN SERPL-MCNC: 25 MG/DL (ref 8–23)
CALCIUM SERPL-MCNC: 9.6 MG/DL (ref 8.6–10.4)
CHLORIDE SERPL-SCNC: 101 MMOL/L (ref 98–107)
CO2 SERPL-SCNC: 21 MMOL/L (ref 20–31)
CREAT SERPL-MCNC: 1.3 MG/DL (ref 0.5–0.9)
GFR SERPL CREATININE-BSD FRML MDRD: 45 ML/MIN/1.73M2
GLUCOSE SERPL-MCNC: 91 MG/DL (ref 70–99)
POTASSIUM SERPL-SCNC: 5.1 MMOL/L (ref 3.7–5.3)
PROT SERPL-MCNC: 7.4 G/DL (ref 6.4–8.3)
SODIUM SERPL-SCNC: 136 MMOL/L (ref 135–144)

## 2024-02-17 LAB
ALBUMIN SERPL-MCNC: 4.4 G/DL (ref 3.5–5.2)
ALBUMIN/GLOB SERPL: 1.5 {RATIO} (ref 1–2.5)
ALP SERPL-CCNC: 66 U/L (ref 35–104)
ALT SERPL-CCNC: 22 U/L (ref 5–33)
ANION GAP SERPL CALCULATED.3IONS-SCNC: 14 MMOL/L (ref 9–17)
AST SERPL-CCNC: 22 U/L
BILIRUB SERPL-MCNC: 0.5 MG/DL (ref 0.3–1.2)
BUN SERPL-MCNC: 25 MG/DL (ref 8–23)
CALCIUM SERPL-MCNC: 9.6 MG/DL (ref 8.6–10.4)
CHLORIDE SERPL-SCNC: 101 MMOL/L (ref 98–107)
CHOLEST SERPL-MCNC: 115 MG/DL
CHOLESTEROL/HDL RATIO: 3.3
CK SERPL-CCNC: 37 U/L (ref 26–192)
CO2 SERPL-SCNC: 21 MMOL/L (ref 20–31)
CREAT SERPL-MCNC: 1.3 MG/DL (ref 0.5–0.9)
GFR SERPL CREATININE-BSD FRML MDRD: 45 ML/MIN/1.73M2
GLUCOSE SERPL-MCNC: 91 MG/DL (ref 70–99)
HDLC SERPL-MCNC: 35 MG/DL
LDLC SERPL CALC-MCNC: 50 MG/DL (ref 0–130)
POTASSIUM SERPL-SCNC: 5.1 MMOL/L (ref 3.7–5.3)
PROT SERPL-MCNC: 7.4 G/DL (ref 6.4–8.3)
SODIUM SERPL-SCNC: 136 MMOL/L (ref 135–144)
TRIGL SERPL-MCNC: 152 MG/DL

## 2024-03-04 ENCOUNTER — PATIENT MESSAGE (OUTPATIENT)
Dept: INTERNAL MEDICINE CLINIC | Age: 69
End: 2024-03-04

## 2024-03-04 ENCOUNTER — OFFICE VISIT (OUTPATIENT)
Dept: ORTHOPEDIC SURGERY | Age: 69
End: 2024-03-04
Payer: COMMERCIAL

## 2024-03-04 VITALS — WEIGHT: 212.2 LBS | BODY MASS INDEX: 39.05 KG/M2 | RESPIRATION RATE: 15 BRPM | HEIGHT: 62 IN

## 2024-03-04 DIAGNOSIS — C64.1 RENAL CELL CARCINOMA OF RIGHT KIDNEY (HCC): ICD-10-CM

## 2024-03-04 DIAGNOSIS — M65.4 DE QUERVAIN'S DISEASE (RADIAL STYLOID TENOSYNOVITIS): ICD-10-CM

## 2024-03-04 DIAGNOSIS — N18.2 STAGE 2 CHRONIC KIDNEY DISEASE: Primary | ICD-10-CM

## 2024-03-04 DIAGNOSIS — M77.8 RIGHT WRIST TENDINITIS: Primary | ICD-10-CM

## 2024-03-04 PROCEDURE — 1123F ACP DISCUSS/DSCN MKR DOCD: CPT | Performed by: PHYSICIAN ASSISTANT

## 2024-03-04 PROCEDURE — 99213 OFFICE O/P EST LOW 20 MIN: CPT | Performed by: PHYSICIAN ASSISTANT

## 2024-03-04 ASSESSMENT — ENCOUNTER SYMPTOMS
SHORTNESS OF BREATH: 0
ABDOMINAL DISTENTION: 0
GASTROINTESTINAL NEGATIVE: 1
APNEA: 0
CHEST TIGHTNESS: 0
COLOR CHANGE: 0
NAUSEA: 0
ABDOMINAL PAIN: 0
RESPIRATORY NEGATIVE: 1
DIARRHEA: 0
CONSTIPATION: 0
COUGH: 0
VOMITING: 0

## 2024-03-04 NOTE — PROGRESS NOTES
[facility] ORTHOPEDICS AND SPORTS MEDICINE  [address]  [place] [address]  Dept: [phone]  Dept Fax: [phone]        Ambulatory Follow Up      Subjective:   Kodak Arnett is a 68 y.o. year old female who presents to our office today for routine followup regarding her   1. Right wrist tendinitis    2. De Quervain's disease (radial styloid tenosynovitis)    .    Chief Complaint   Patient presents with    Wrist Pain     Right       HPI Kodak Arnett  is a 68 y.o. right hand dominant  female who presents today in follow for de Quervain's tenosynovitis.  The patient was last seen on 1/16/2024 and underwent treatment in the form of de Quervain's tenosynovitis injection with some extensor tendinitis of the wrist.  Patient was going to buy a thumb spica splint online.  We did discuss occupational therapy but she wanted to try exercises on her own first.  The patient notes 100% improvement until about 2 weeks ago. She was doing the exercises. She has been doing to Voltaren gel that is helping.      Review of Systems   Constitutional:  Positive for activity change. Negative for appetite change, fatigue and fever.   Respiratory: Negative.  Negative for apnea, cough, chest tightness and shortness of breath.    Cardiovascular: Negative.  Negative for chest pain, palpitations and leg swelling.   Gastrointestinal: Negative.  Negative for abdominal distention, abdominal pain, constipation, diarrhea, nausea and vomiting.   Genitourinary: Negative.  Negative for difficulty urinating, dysuria and hematuria.   Musculoskeletal:  Positive for arthralgias. Negative for gait problem, joint swelling and myalgias.   Skin: Negative.  Negative for color change and rash.   Neurological:  Positive for weakness. Negative for dizziness, numbness and headaches.   Psychiatric/Behavioral:  Positive for sleep disturbance.          Objective :

## 2024-03-05 NOTE — TELEPHONE ENCOUNTER
From: Kodak Arnett  To: Dr. Eris Almodovar  Sent: 3/4/2024 6:47 PM EST  Subject: Kidney     Hi Doctor K  I went to the orthopedic doctor today she mentioned i have stage 3 kidney disease.  The first time i have heard that. Is this true?  Do i need a nephrologist?

## 2024-03-07 ENCOUNTER — HOSPITAL ENCOUNTER (OUTPATIENT)
Dept: SLEEP CENTER | Age: 69
Discharge: HOME | End: 2024-03-09
Attending: INTERNAL MEDICINE
Payer: COMMERCIAL

## 2024-03-07 DIAGNOSIS — G47.30 SLEEP APNEA, UNSPECIFIED TYPE: ICD-10-CM

## 2024-03-07 PROCEDURE — G0399 HOME SLEEP TEST/TYPE 3 PORTA: HCPCS

## 2024-03-19 LAB — STATUS: NORMAL

## 2024-03-25 RX ORDER — LINAGLIPTIN 5 MG/1
5 TABLET, FILM COATED ORAL DAILY
Qty: 90 TABLET | Refills: 0 | Status: SHIPPED | OUTPATIENT
Start: 2024-03-25

## 2024-03-25 NOTE — TELEPHONE ENCOUNTER
Kodak Arnett is calling to request a refill on the following medication(s):    Medication Request:  Requested Prescriptions     Pending Prescriptions Disp Refills    TRADJENTA 5 MG tablet [Pharmacy Med Name: Tradjenta Oral Tablet 5 MG] 90 tablet 0     Sig: TAKE ONE TABLET BY MOUTH ONE TIME DAILY       Last Visit Date (If Applicable):  1/10/2024    Next Visit Date:    5/13/2024      Last refill 9/12/23. Prescription pending.

## 2024-03-27 RX ORDER — ESCITALOPRAM OXALATE 10 MG/1
TABLET ORAL
Qty: 90 TABLET | Refills: 1 | Status: SHIPPED | OUTPATIENT
Start: 2024-03-27

## 2024-03-27 RX ORDER — ESCITALOPRAM OXALATE 10 MG/1
10 TABLET ORAL DAILY
Qty: 90 TABLET | Refills: 1 | Status: CANCELLED | OUTPATIENT
Start: 2024-03-27

## 2024-03-27 NOTE — TELEPHONE ENCOUNTER
Kodak Arnett is calling to request a refill on the following medication(s):    Medication Request:  Requested Prescriptions     Pending Prescriptions Disp Refills    escitalopram (LEXAPRO) 10 MG tablet [Pharmacy Med Name: Escitalopram Oxalate Oral Tablet 10 MG] 90 tablet 0     Sig: TAKE ONE TABLET BY MOUTH ONE TIME DAILY       Last Visit Date (If Applicable):  1/10/2024    Next Visit Date:    3/27/2024      Last refill 9/25/23. Prescription pending.

## 2024-04-09 ENCOUNTER — HOSPITAL ENCOUNTER (OUTPATIENT)
Facility: CLINIC | Age: 69
Setting detail: THERAPIES SERIES
Discharge: HOME | End: 2024-04-09
Payer: COMMERCIAL

## 2024-04-09 PROCEDURE — 97110 THERAPEUTIC EXERCISES: CPT

## 2024-04-09 PROCEDURE — 97760 ORTHOTIC MGMT&TRAING 1ST ENC: CPT

## 2024-04-09 PROCEDURE — 97165 OT EVAL LOW COMPLEX 30 MIN: CPT

## 2024-04-09 NOTE — THERAPY EVALUATION
hysterectomy for benign disease    Vaginal atrophy    Major depressive disorder, single episode with anxious distress    Type 2 diabetes mellitus without complication, without long-term current use of insulin (HCC)    Renal cell carcinoma of right kidney (HCC)       Medications: Refer to Medical chart in [facility] Allergies:  Refer to Medical chart in [facility]    Pain: Intensity:   7/10 Location: 1st Dorsal compartment     Pain Type: Intermittant and with movement/activity  Pain Altered Tx: yes  Action Taken:heat pack            Home Environment:    Pt lives with spouse.   She works as a     She has pets at home     Orthosis: Currently has a pre otoniel wrist cock up. She had a thumb spica but it rubbed on the area of pain.   Thumb spica To be custom fabricated this date    Objective: Tests/Measurements: Upper Extremity Functional Index  Current Functional Level:  31 functionally impaired as measured with the Upper Extremity Functional Index Survey.  0-80 scale, with 80 = no Deficits  (The UEFI model does not provide any specific cut off points that could classify the upper limb disability degree, however, a minimal detectable change of 9 points is provided.This means that for improvement or deterioration to be considered, between two subsequent evaluations, the scores must differ by at least 9 points.)    Sensibility: Normal Edema: Min    at 1st DC and FCR       Problems: Pain, ROM, Strength, and Function     Wrist ext/flex: Rt 65/60 with 7/10 pain in flex. Lt 69/70  Three point pinch: Rt 6# with 5/10 pain,  Lt 12#     Assessment:      Pt reports 2 months of thumb/wrist pain consistent with Dequervains.   She has limited ROM, pain and weakness.   She has significant tightness in the FA extensor mass.   Issued HEP of Gentle ROM, but also fabricated a custom thumb spica splint to decrease pain. Educated pt that strengthening will be started when the pain improves.   Patient would benefit from skilled occupational

## 2024-04-11 ENCOUNTER — TELEPHONE (OUTPATIENT)
Dept: ORTHOPEDIC SURGERY | Age: 69
End: 2024-04-11

## 2024-04-11 DIAGNOSIS — M65.4 DE QUERVAIN'S DISEASE (RADIAL STYLOID TENOSYNOVITIS): Primary | ICD-10-CM

## 2024-04-11 NOTE — TELEPHONE ENCOUNTER
I called and spoke to Carmelo.  We discussed that she just started occupational therapy and got a new brace 2 days ago.  Instead of her following up with me on Monday, please call the patient and reschedule her for 4 weeks from now that way she has time for occupational therapy to see you help but they can do to help it prior to seeing me back.

## 2024-04-12 NOTE — TELEPHONE ENCOUNTER
Called and rescheduled the patient further out for 4 weeks. Patient was in agreement and is now scheduled for 5/6 at 8:30 am.

## 2024-04-13 ENCOUNTER — HOSPITAL ENCOUNTER (OUTPATIENT)
Dept: MAMMOGRAPHY | Age: 69
End: 2024-04-13
Attending: STUDENT IN AN ORGANIZED HEALTH CARE EDUCATION/TRAINING PROGRAM
Payer: COMMERCIAL

## 2024-04-13 VITALS — HEIGHT: 62 IN | BODY MASS INDEX: 39.01 KG/M2 | WEIGHT: 212 LBS

## 2024-04-13 DIAGNOSIS — Z12.31 SCREENING MAMMOGRAM FOR BREAST CANCER: ICD-10-CM

## 2024-04-13 PROCEDURE — 77063 BREAST TOMOSYNTHESIS BI: CPT

## 2024-04-16 ENCOUNTER — HOSPITAL ENCOUNTER (OUTPATIENT)
Facility: CLINIC | Age: 69
Setting detail: THERAPIES SERIES
Discharge: HOME | End: 2024-04-16
Payer: COMMERCIAL

## 2024-04-16 NOTE — FLOWSHEET NOTE
[] [facility]  Outpatient Rehabilitation &  Therapy  [address]  [phone]  [phone] [x] [facility]  Outpatient Rehabilitation &  Therapy  [address]  P: [phone]  F: [phone] [] [facility]  Outpatient Rehabilitation &  Therapy  [address]  P: [phone]  F: [phone] [] [facility]  Outpatient Rehabilitation &  Therapy  [address]  [phone]  [phone] [] [facility]  Outpatient Rehabilitation &  Therapy  [address]   P: [phone]  F: [phone]  [] [facility]  Outpatient Rehabilitation &  Therapy  [address]  P: [phone]  F: [phone] [] [facility]  Outpatient Rehabilitation &  Therapy  [address]  P: [phone]  F: [phone] [] [facility]  Outpatient Rehabilitation &  Therapy  [address]  P: [phone]  F: [phone] [] [facility]  Outpatient Rehabilitation &  Therapy  [address]  P: [phone]  F: [phone]  [] [facility] Outpatient Rehabilitation &  Therapy  [address]  P: [phone]  F: [phone]     Therapy Cancel/No Show note    Date: 2024  Patient: Kodak Arnett  : 1955  MRN: [id number]    Cancels/No Shows to date: 1    For today's appointment patient:    [x]  Cancelled    [] Rescheduled appointment    [] No-show     Reason given by patient:    []  Patient ill    []  Conflicting appointment    [] No transportation      [x] Conflict with work    [] No reason given    [] Weather related    [] COVID-19    [] Other:      Comments:        [x] Next appointment was confirmed    Electronically signed by: WALDO HICKS OT

## 2024-04-23 ENCOUNTER — HOSPITAL ENCOUNTER (OUTPATIENT)
Facility: CLINIC | Age: 69
Setting detail: THERAPIES SERIES
Discharge: HOME | End: 2024-04-23
Payer: COMMERCIAL

## 2024-04-23 NOTE — FLOWSHEET NOTE
[] [facility]  Outpatient Rehabilitation &  Therapy  [address]  [phone]  [phone] [x] [facility]  Outpatient Rehabilitation &  Therapy  [address]  P: [phone]  F: [phone] [] [facility]  Outpatient Rehabilitation &  Therapy  [address]  P: [phone]  F: [phone] [] [facility]  Outpatient Rehabilitation &  Therapy  [address]  [phone]  [phone] [] [facility]  Outpatient Rehabilitation &  Therapy  [address]   P: [phone]  F: [phone]  [] [facility]  Outpatient Rehabilitation &  Therapy  [address]  P: [phone]  F: [phone] [] [facility]  Outpatient Rehabilitation &  Therapy  [address]  P: [phone]  F: [phone] [] [facility]  Outpatient Rehabilitation &  Therapy  [address]  P: [phone]  F: [phone] [] [facility]  Outpatient Rehabilitation &  Therapy  [address]  P: [phone]  F: [phone]  [] [facility] Outpatient Rehabilitation &  Therapy  [address]  P: [phone]  F: [phone]     Therapy Cancel/No Show note    Date: 2024  Patient: Kodak Arnett  : 1955  MRN: [id number]    Cancels/No Shows to date:     For today's appointment patient:    [x]  Cancelled    [] Rescheduled appointment    [] No-show     Reason given by patient:    []  Patient ill    []  Conflicting appointment    [] No transportation      [] Conflict with work    [] No reason given    [] Weather related    [] COVID-19    [x] Other:      Comments:  pt came@530pm,thought appt was 530pm. nxt appt @5pm confirmed.ls       [x] Next appointment was confirmed    Electronically signed by: WALDO HICKS OT

## 2024-04-30 ENCOUNTER — HOSPITAL ENCOUNTER (OUTPATIENT)
Facility: CLINIC | Age: 69
Setting detail: THERAPIES SERIES
Discharge: HOME | End: 2024-04-30
Payer: COMMERCIAL

## 2024-05-01 NOTE — FLOWSHEET NOTE
[] [facility]  Outpatient Rehabilitation &  Therapy  [address]  [phone]  [phone] [x] [facility]  Outpatient Rehabilitation &  Therapy  [address]  P: [phone]  F: [phone] [] [facility]  Outpatient Rehabilitation &  Therapy  [address]  P: [phone]  F: [phone] [] [facility]  Outpatient Rehabilitation &  Therapy  [address]  [phone]  [phone] [] [facility]  Outpatient Rehabilitation &  Therapy  [address]   P: [phone]  F: [phone]  [] [facility]  Outpatient Rehabilitation &  Therapy  [address]  P: [phone]  F: [phone] [] [facility]  Outpatient Rehabilitation &  Therapy  [address]  P: [phone]  F: [phone] [] [facility]  Outpatient Rehabilitation &  Therapy  [address]  P: [phone]  F: [phone] [] [facility]  Outpatient Rehabilitation &  Therapy  [address]  P: [phone]  F: [phone]  [] [facility] Outpatient Rehabilitation &  Therapy  [address]  P: [phone]  F: [phone]     Therapy Cancel/No Show note    Date: 2024  Patient: Kodak Arnett  : 1955  MRN: [id number]    Cancels/No Shows to date:     For today's appointment patient:    [x]  Cancelled    [] Rescheduled appointment    [] No-show     Reason given by patient:    []  Patient ill    []  Conflicting appointment    [] No transportation      [x] Conflict with work    [] No reason given    [] Weather related    [] COVID-19    [] Other:            [x] Next appointment was confirmed    Electronically signed by: WALDO HICKS OT

## 2024-05-02 NOTE — PROGRESS NOTES
[facility] ORTHOPEDICS AND SPORTS MEDICINE  [address]  [place] [address]  Dept: [phone]  Dept Fax: [phone]        Ambulatory Follow Up      Subjective:   Kodak Arnett is a 68 y.o. year old female who presents to our office today for routine followup regarding her   1. De Quervain's disease (radial styloid tenosynovitis)    .    Chief Complaint   Patient presents with    Hand Pain     Right Thumb/Wrist Pain       HPI Kodak Arnett  is a 68 y.o. right hand dominant  female who presents today in follow for de Quervain's tenosynovitis of the right wrist.  Occupation: .  The patient was last seen on 3/4/2024 and underwent treatment in the form of getting a prescription for occupational therapy and to use iontophoresis.  The patient only went to her initial evaluation and then canceled all the rest of her appointments she also has a thumb spica brace.  The patient notes 40% improvement with the previous treatment.   She has only been able to go to 1 OT session.  She does feel that the custom brace that they made is helpful.      Review of Systems   Constitutional:  Positive for activity change. Negative for appetite change, fatigue and fever.   Respiratory: Negative.  Negative for apnea, cough, chest tightness and shortness of breath.    Cardiovascular: Negative.  Negative for chest pain, palpitations and leg swelling.   Gastrointestinal: Negative.  Negative for abdominal distention, abdominal pain, constipation, diarrhea, nausea and vomiting.   Genitourinary: Negative.  Negative for difficulty urinating, dysuria and hematuria.   Musculoskeletal:  Positive for arthralgias. Negative for gait problem, joint swelling and myalgias.   Skin: Negative.  Negative for color change and rash.   Neurological:  Negative for dizziness, weakness, numbness and headaches.   Psychiatric/Behavioral: Negative.

## 2024-05-06 ENCOUNTER — OFFICE VISIT (OUTPATIENT)
Dept: ORTHOPEDIC SURGERY | Age: 69
End: 2024-05-06
Payer: COMMERCIAL

## 2024-05-06 VITALS — OXYGEN SATURATION: 98 % | WEIGHT: 214 LBS | RESPIRATION RATE: 16 BRPM | HEIGHT: 62 IN | BODY MASS INDEX: 39.38 KG/M2

## 2024-05-06 DIAGNOSIS — M65.4 DE QUERVAIN'S DISEASE (RADIAL STYLOID TENOSYNOVITIS): Primary | ICD-10-CM

## 2024-05-06 PROCEDURE — 1123F ACP DISCUSS/DSCN MKR DOCD: CPT | Performed by: PHYSICIAN ASSISTANT

## 2024-05-06 PROCEDURE — 99213 OFFICE O/P EST LOW 20 MIN: CPT | Performed by: PHYSICIAN ASSISTANT

## 2024-05-06 ASSESSMENT — ENCOUNTER SYMPTOMS
CHEST TIGHTNESS: 0
COUGH: 0
VOMITING: 0
COLOR CHANGE: 0
DIARRHEA: 0
NAUSEA: 0
SHORTNESS OF BREATH: 0
ABDOMINAL PAIN: 0
APNEA: 0
ABDOMINAL DISTENTION: 0
GASTROINTESTINAL NEGATIVE: 1
CONSTIPATION: 0
RESPIRATORY NEGATIVE: 1

## 2024-05-07 NOTE — PROGRESS NOTES
Kodak Arnett  2023              68 y.o.  Chief Complaint   Patient presents with    Annual Exam         No LMP recorded. Patient has had a hysterectomy.           Primary Care Physician: Eris Almodovar MD    HPI : Kodak Arnett is a 68 y.o. female     Patient is here today for her well women annual exam. She was seen and examined. She is occasionally sexually active. She has been on vaginal estrogen for a while and just recently her insurance stopped covering it. It is about $100 a box and she cannot afford that. She wishes she could be more sexually active but states it has been about a yr. She does not even want to attempt it right now because she knows how much it will hurt. She uses the vaginal estrogen to take the edge off so she can continue working. She has noticed some yellowish discharge that started a few days ago. She never saw Dr Cuellar last yr for consultation for pavan decker laser because she was diagnosed with renal cell carcinoma. It was able to be excised and she did not thankfully have to undergo chemo.   ________________________________________________________________________  OB History    Para Term  AB Living   3 2 2   1 1   SAB IAB Ectopic Molar Multiple Live Births   1         1      # Outcome Date GA Lbr Kwabena/2nd Weight Sex Delivery Anes PTL Lv   3 Term            2 SAB            1 Term         JITENDRA     Past Medical History:   Diagnosis Date    Anxiety     Cancer (HCC) 25    Apolinar cell carcinoma    Chronic UTI     COVID-19 vaccine administered     Fibromyalgia     Hashimoto's disease     Hematuria due to chronic cystitis     HTN (hypertension)     IBS (irritable bowel syndrome)     Infertility, female     Major depressive disorder, single episode with anxious distress 10/08/2019    Obesity     Prediabetes     Renal cyst, right     Tachycardia     Unspecified sleep apnea     does not use machine

## 2024-05-08 ENCOUNTER — HOSPITAL ENCOUNTER (OUTPATIENT)
Age: 69
Setting detail: SPECIMEN
Discharge: HOME | End: 2024-05-08

## 2024-05-08 ENCOUNTER — OFFICE VISIT (OUTPATIENT)
Dept: OBGYN CLINIC | Age: 69
End: 2024-05-08
Payer: COMMERCIAL

## 2024-05-08 VITALS
WEIGHT: 212 LBS | HEIGHT: 63 IN | HEART RATE: 75 BPM | SYSTOLIC BLOOD PRESSURE: 141 MMHG | DIASTOLIC BLOOD PRESSURE: 88 MMHG | BODY MASS INDEX: 37.56 KG/M2

## 2024-05-08 DIAGNOSIS — Z12.31 ENCOUNTER FOR SCREENING MAMMOGRAM FOR MALIGNANT NEOPLASM OF BREAST: ICD-10-CM

## 2024-05-08 DIAGNOSIS — Z13.820 SCREENING FOR OSTEOPOROSIS: ICD-10-CM

## 2024-05-08 DIAGNOSIS — N89.8 VAGINAL DISCHARGE: ICD-10-CM

## 2024-05-08 DIAGNOSIS — Z01.419 WELL WOMAN EXAM WITH ROUTINE GYNECOLOGICAL EXAM: Primary | ICD-10-CM

## 2024-05-08 DIAGNOSIS — N95.2 VAGINAL ATROPHY: ICD-10-CM

## 2024-05-08 DIAGNOSIS — N94.10 DYSPAREUNIA IN FEMALE: ICD-10-CM

## 2024-05-08 DIAGNOSIS — N95.8 GENITOURINARY SYNDROME OF MENOPAUSE: ICD-10-CM

## 2024-05-08 PROCEDURE — 3079F DIAST BP 80-89 MM HG: CPT | Performed by: STUDENT IN AN ORGANIZED HEALTH CARE EDUCATION/TRAINING PROGRAM

## 2024-05-08 PROCEDURE — 3077F SYST BP >= 140 MM HG: CPT | Performed by: STUDENT IN AN ORGANIZED HEALTH CARE EDUCATION/TRAINING PROGRAM

## 2024-05-08 PROCEDURE — 99397 PER PM REEVAL EST PAT 65+ YR: CPT | Performed by: STUDENT IN AN ORGANIZED HEALTH CARE EDUCATION/TRAINING PROGRAM

## 2024-05-09 LAB
CANDIDA SPECIES: NEGATIVE
GARDNERELLA VAGINALIS: NEGATIVE
SOURCE: NORMAL
TRICHOMONAS: NEGATIVE

## 2024-05-10 DIAGNOSIS — N95.8 GENITOURINARY SYNDROME OF MENOPAUSE: Primary | ICD-10-CM

## 2024-05-10 DIAGNOSIS — N94.10 DYSPAREUNIA IN FEMALE: ICD-10-CM

## 2024-05-10 DIAGNOSIS — N95.2 VAGINAL ATROPHY: ICD-10-CM

## 2024-05-10 RX ORDER — ESTRADIOL 10 UG/1
INSERT VAGINAL
Qty: 90 TABLET | Refills: 3 | Status: SHIPPED
Start: 2024-05-10 | End: 2024-05-10

## 2024-05-14 DIAGNOSIS — N94.10 DYSPAREUNIA IN FEMALE: ICD-10-CM

## 2024-05-14 DIAGNOSIS — N95.8 GENITOURINARY SYNDROME OF MENOPAUSE: ICD-10-CM

## 2024-05-14 DIAGNOSIS — N95.2 VAGINAL ATROPHY: ICD-10-CM

## 2024-05-20 RX ORDER — ATENOLOL 50 MG/1
TABLET ORAL
Qty: 180 TABLET | Refills: 0 | Status: SHIPPED | OUTPATIENT
Start: 2024-05-20

## 2024-05-20 NOTE — TELEPHONE ENCOUNTER
Kodak Arnett is calling to request a refill on the following medication(s):    Medication Request:  Requested Prescriptions     Pending Prescriptions Disp Refills    atenolol (TENORMIN) 50 MG tablet [Pharmacy Med Name: Atenolol Oral Tablet 50 MG] 180 tablet 0     Sig: TAKE ONE TABLET BY MOUTH TWICE DAILY       Last Visit Date (If Applicable):  1/10/2024    Next Visit Date:    6/13/2024      Last refill 12/1/23. Prescription pending.

## 2024-06-11 RX ORDER — LINAGLIPTIN 5 MG/1
5 TABLET, FILM COATED ORAL DAILY
Qty: 90 TABLET | Refills: 0 | Status: SHIPPED | OUTPATIENT
Start: 2024-06-11 | End: 2024-06-11 | Stop reason: SDUPTHER

## 2024-06-11 NOTE — TELEPHONE ENCOUNTER
Last Visit:  1/10/2024     Next Visit Date:  Future Appointments   Date Time Provider Department Center   8/21/2024  9:10 AM Tyrone Banda MD [facility] None   9/3/2024  2:45 PM Eris Almodovar [facility]       Health Maintenance   Topic Date Due    Pneumococcal 65+ years Vaccine (1 of 2 - PCV) Never done    Diabetic foot exam  Never done    DTaP/Tdap/Td vaccine (1 - Tdap) Never done    Shingles vaccine (1 of 2) Never done    DEXA (modify frequency per FRAX score)  Never done    Respiratory Syncytial Virus (RSV) Pregnant or age 60 yrs+ (1 - 1-dose 60+ series) Never done    Diabetic retinal exam  01/21/2020    COVID-19 Vaccine (5 - 2023-24 season) 09/01/2023    Depression Monitoring  01/03/2025    A1C test (Diabetic or Prediabetic)  02/14/2025    Diabetic Alb to Cr ratio (uACR) test  02/14/2025    Lipids  02/14/2025    GFR test (Diabetes, CKD 3-4, OR last GFR 15-59)  02/14/2025    Breast cancer screen  04/13/2026    Colorectal Cancer Screen  06/29/2027    Flu vaccine  Completed    Hepatitis C screen  Completed    Hepatitis A vaccine  Aged Out    Hepatitis B vaccine  Aged Out    Hib vaccine  Aged Out    Polio vaccine  Aged Out    Meningococcal (ACWY) vaccine  Aged Out       Hemoglobin A1C (%)   Date Value   02/14/2024 5.7   10/05/2023 5.6   05/01/2023 6.4             ( goal A1C is < 7)   No components found for: \"LABMICR\"  No components found for: \"LDLCHOLESTEROL\", \"LDLCALC\"    (goal LDL is <100)   AST (U/L)   Date Value   02/14/2024 22   02/14/2024 22     ALT (U/L)   Date Value   02/14/2024 22   02/14/2024 22     BUN (mg/dL)   Date Value   02/14/2024 25 (H)   02/14/2024 25 (H)     BP Readings from Last 3 Encounters:   05/08/24 (!) 141/88   01/10/24 132/74   08/01/23 120/76          (goal 120/80)    All Future Testing planned in [facility]  Lab Frequency Next Occurrence   Sleep Study with PAP Titration Once 01/10/2024   Microalbumin / Creatinine Urine Ratio Once 01/10/2024   Urinalysis with Reflex to

## 2024-06-12 NOTE — TELEPHONE ENCOUNTER
Kodak Arnett is calling to request a refill on the following medication(s):    Last Visit Date (If Applicable):  1/10/2024    Next Visit Date:    9/3/2024    Medication Request:  Requested Prescriptions     Pending Prescriptions Disp Refills    linagliptin (TRADJENTA) 5 MG tablet 90 tablet 0     Sig: Take 1 tablet by mouth daily

## 2024-06-18 RX ORDER — LOSARTAN POTASSIUM 100 MG/1
100 TABLET ORAL DAILY
Qty: 90 TABLET | Refills: 0 | Status: SHIPPED | OUTPATIENT
Start: 2024-06-18

## 2024-06-18 NOTE — TELEPHONE ENCOUNTER
Kodak Arnett is calling to request a refill on the following medication(s):    Medication Request:  Requested Prescriptions     Pending Prescriptions Disp Refills    losartan (COZAAR) 100 MG tablet [Pharmacy Med Name: Losartan Potassium Oral Tablet 100 MG] 90 tablet 0     Sig: TAKE ONE TABLET BY MOUTH ONE TIME DAILY       Last Visit Date (If Applicable):  1/10/2024    Next Visit Date:    6/17/2024

## 2024-06-19 RX ORDER — LOSARTAN POTASSIUM 100 MG/1
100 TABLET ORAL DAILY
Qty: 90 TABLET | Refills: 1 | OUTPATIENT
Start: 2024-06-19

## 2024-07-04 ENCOUNTER — PATIENT MESSAGE (OUTPATIENT)
Dept: FAMILY MEDICINE CLINIC | Age: 69
End: 2024-07-04

## 2024-07-04 DIAGNOSIS — Z92.89 HISTORY OF SLEEP STUDY: ICD-10-CM

## 2024-07-04 DIAGNOSIS — E66.9 OBESITY, UNSPECIFIED CLASSIFICATION, UNSPECIFIED OBESITY TYPE, UNSPECIFIED WHETHER SERIOUS COMORBIDITY PRESENT: Primary | ICD-10-CM

## 2024-07-04 DIAGNOSIS — G47.30 SLEEP APNEA, UNSPECIFIED TYPE: ICD-10-CM

## 2024-08-11 ENCOUNTER — HOSPITAL ENCOUNTER (OUTPATIENT)
Dept: SLEEP CENTER | Age: 69
Discharge: HOME | End: 2024-08-13
Attending: INTERNAL MEDICINE
Payer: MEDICARE

## 2024-08-11 VITALS — WEIGHT: 212 LBS | BODY MASS INDEX: 37.56 KG/M2 | HEIGHT: 63 IN

## 2024-08-11 DIAGNOSIS — G47.30 SLEEP APNEA, UNSPECIFIED TYPE: ICD-10-CM

## 2024-08-11 DIAGNOSIS — Z92.89 HISTORY OF SLEEP STUDY: ICD-10-CM

## 2024-08-11 DIAGNOSIS — E66.9 OBESITY, UNSPECIFIED CLASSIFICATION, UNSPECIFIED OBESITY TYPE, UNSPECIFIED WHETHER SERIOUS COMORBIDITY PRESENT: ICD-10-CM

## 2024-08-11 PROCEDURE — 95811 POLYSOM 6/>YRS CPAP 4/> PARM: CPT

## 2024-08-21 ENCOUNTER — PATIENT MESSAGE (OUTPATIENT)
Dept: FAMILY MEDICINE CLINIC | Age: 69
End: 2024-08-21

## 2024-08-22 ENCOUNTER — TELEPHONE (OUTPATIENT)
Dept: FAMILY MEDICINE CLINIC | Age: 69
End: 2024-08-22

## 2024-08-22 NOTE — TELEPHONE ENCOUNTER
Patient calling in stating she received a voicemail stating she needs to get her records to us from her sleep study. Patient is saying she can she the results in her MyChart. I explained that unfortunately we do not see them on our end. I advised patient to call their office and request that they fax them.

## 2024-08-22 NOTE — TELEPHONE ENCOUNTER
Left message for patient to call and let us know who did the sleep study. Results aren't in the computer.

## 2024-08-28 LAB — STATUS: NORMAL

## 2024-09-05 ENCOUNTER — TELEPHONE (OUTPATIENT)
Dept: FAMILY MEDICINE CLINIC | Age: 69
End: 2024-09-05

## 2024-09-05 NOTE — TELEPHONE ENCOUNTER
Jenny from [facility] is requesting the notes from 1/10/24 be addend to mention current sleep symptoms. The letter at this time just states her past symptoms. Please fax notes to [phone]

## 2024-09-06 RX ORDER — ESCITALOPRAM OXALATE 10 MG/1
TABLET ORAL
Qty: 90 TABLET | Refills: 0 | Status: SHIPPED | OUTPATIENT
Start: 2024-09-06

## 2024-09-06 NOTE — TELEPHONE ENCOUNTER
Kodak Arnett is calling to request a refill on the following medication(s):    Last Visit Date (If Applicable):  1/10/2024    Next Visit Date:    9/24/2024    Medication Request:  Requested Prescriptions     Pending Prescriptions Disp Refills    escitalopram (LEXAPRO) 10 MG tablet [Pharmacy Med Name: Escitalopram Oxalate Oral Tablet 10 MG] 90 tablet 0     Sig: TAKE ONE TABLET BY MOUTH ONE TIME DAILY

## 2024-09-10 ENCOUNTER — HOSPITAL ENCOUNTER (OUTPATIENT)
Facility: CLINIC | Age: 69
Discharge: HOME | End: 2024-09-10
Payer: MEDICARE

## 2024-09-10 DIAGNOSIS — N18.31 STAGE 3A CHRONIC KIDNEY DISEASE (CKD) (HCC): ICD-10-CM

## 2024-09-10 DIAGNOSIS — R80.9 PROTEINURIA, UNSPECIFIED TYPE: ICD-10-CM

## 2024-09-10 LAB
C3 SERPL-MCNC: 148 MG/DL (ref 90–180)
C4 SERPL-MCNC: 30 MG/DL (ref 10–40)
FREE KAPPA/LAMBDA RATIO: 1.33 (ref 0.22–1.74)
KAPPA LC FREE SER-MCNC: 46.5 MG/L
LAMBDA LC FREE SERPL-MCNC: 34.9 MG/L (ref 4.2–27.7)
RHEUMATOID FACT SER NEPH-ACNC: <10 IU/ML (ref 0–13)

## 2024-09-10 PROCEDURE — 86038 ANTINUCLEAR ANTIBODIES: CPT

## 2024-09-10 PROCEDURE — 83521 IG LIGHT CHAINS FREE EACH: CPT

## 2024-09-10 PROCEDURE — 83516 IMMUNOASSAY NONANTIBODY: CPT

## 2024-09-10 PROCEDURE — 84165 PROTEIN E-PHORESIS SERUM: CPT

## 2024-09-10 PROCEDURE — 86431 RHEUMATOID FACTOR QUANT: CPT

## 2024-09-10 PROCEDURE — 36415 COLL VENOUS BLD VENIPUNCTURE: CPT

## 2024-09-10 PROCEDURE — 84155 ASSAY OF PROTEIN SERUM: CPT

## 2024-09-10 PROCEDURE — 86160 COMPLEMENT ANTIGEN: CPT

## 2024-09-10 PROCEDURE — 86225 DNA ANTIBODY NATIVE: CPT

## 2024-09-12 LAB
ALBUMIN PERCENT: 61 % (ref 45–65)
ALBUMIN SERPL-MCNC: 4.2 G/DL (ref 3.2–5.2)
ALPHA 2 PERCENT: 8 % (ref 6–13)
ALPHA1 GLOB SERPL ELPH-MCNC: 0.3 G/DL (ref 0.1–0.4)
ALPHA1 GLOB SERPL ELPH-MCNC: 4 % (ref 3–6)
ALPHA2 GLOB SERPL ELPH-MCNC: 0.6 G/DL (ref 0.5–0.9)
ANA SER QL IA: NEGATIVE
ANCA MYELOPEROXIDASE: 0.3 AU/ML (ref 0–3.5)
ANCA PROTEINASE 3: <0.7 AU/ML (ref 0–2)
B-GLOBULIN SERPL ELPH-MCNC: 0.8 G/DL (ref 0.5–1.1)
B-GLOBULIN SERPL ELPH-MCNC: 11 % (ref 11–19)
DSDNA IGG SER QL IA: 1.3 IU/ML
GAMMA GLOB SERPL ELPH-MCNC: 1.1 G/DL (ref 0.5–1.5)
GAMMA GLOBULIN %: 16 % (ref 9–20)
NUCLEAR IGG SER IA-RTO: 0.4 U/ML
PATHOLOGIST: NORMAL
PROT PATTERN SERPL ELPH-IMP: NORMAL
PROT SERPL-MCNC: 6.9 G/DL (ref 6.6–8.7)
TOTAL PROT. SUM,%: 100 % (ref 98–102)
TOTAL PROT. SUM: 7 G/DL (ref 6.3–8.2)

## 2024-09-16 RX ORDER — LOSARTAN POTASSIUM 100 MG/1
100 TABLET ORAL DAILY
Qty: 90 TABLET | Refills: 0 | Status: SHIPPED | OUTPATIENT
Start: 2024-09-16

## 2024-09-20 SDOH — HEALTH STABILITY: PHYSICAL HEALTH: ON AVERAGE, HOW MANY DAYS PER WEEK DO YOU ENGAGE IN MODERATE TO STRENUOUS EXERCISE (LIKE A BRISK WALK)?: 2 DAYS

## 2024-09-20 SDOH — HEALTH STABILITY: PHYSICAL HEALTH: ON AVERAGE, HOW MANY MINUTES DO YOU ENGAGE IN EXERCISE AT THIS LEVEL?: 20 MIN

## 2024-09-20 ASSESSMENT — PATIENT HEALTH QUESTIONNAIRE - PHQ9
2. FEELING DOWN, DEPRESSED OR HOPELESS: SEVERAL DAYS
3. TROUBLE FALLING OR STAYING ASLEEP: NOT AT ALL
1. LITTLE INTEREST OR PLEASURE IN DOING THINGS: NOT AT ALL
SUM OF ALL RESPONSES TO PHQ QUESTIONS 1-9: 5
7. TROUBLE CONCENTRATING ON THINGS, SUCH AS READING THE NEWSPAPER OR WATCHING TELEVISION: SEVERAL DAYS
SUM OF ALL RESPONSES TO PHQ9 QUESTIONS 1 & 2: 1
SUM OF ALL RESPONSES TO PHQ QUESTIONS 1-9: 5
SUM OF ALL RESPONSES TO PHQ QUESTIONS 1-9: 5
10. IF YOU CHECKED OFF ANY PROBLEMS, HOW DIFFICULT HAVE THESE PROBLEMS MADE IT FOR YOU TO DO YOUR WORK, TAKE CARE OF THINGS AT HOME, OR GET ALONG WITH OTHER PEOPLE: NOT DIFFICULT AT ALL
4. FEELING TIRED OR HAVING LITTLE ENERGY: NEARLY EVERY DAY
6. FEELING BAD ABOUT YOURSELF - OR THAT YOU ARE A FAILURE OR HAVE LET YOURSELF OR YOUR FAMILY DOWN: NOT AT ALL
5. POOR APPETITE OR OVEREATING: NOT AT ALL
SUM OF ALL RESPONSES TO PHQ QUESTIONS 1-9: 5
9. THOUGHTS THAT YOU WOULD BE BETTER OFF DEAD, OR OF HURTING YOURSELF: NOT AT ALL

## 2024-09-20 ASSESSMENT — LIFESTYLE VARIABLES
HOW OFTEN DO YOU HAVE SIX OR MORE DRINKS ON ONE OCCASION: 1
HOW OFTEN DO YOU HAVE A DRINK CONTAINING ALCOHOL: 1
HOW MANY STANDARD DRINKS CONTAINING ALCOHOL DO YOU HAVE ON A TYPICAL DAY: PATIENT DOES NOT DRINK
HOW OFTEN DO YOU HAVE A DRINK CONTAINING ALCOHOL: NEVER
HOW MANY STANDARD DRINKS CONTAINING ALCOHOL DO YOU HAVE ON A TYPICAL DAY: 0

## 2024-09-21 SDOH — ECONOMIC STABILITY: INCOME INSECURITY: HOW HARD IS IT FOR YOU TO PAY FOR THE VERY BASICS LIKE FOOD, HOUSING, MEDICAL CARE, AND HEATING?: NOT VERY HARD

## 2024-09-21 SDOH — ECONOMIC STABILITY: FOOD INSECURITY: WITHIN THE PAST 12 MONTHS, YOU WORRIED THAT YOUR FOOD WOULD RUN OUT BEFORE YOU GOT MONEY TO BUY MORE.: NEVER TRUE

## 2024-09-21 SDOH — ECONOMIC STABILITY: FOOD INSECURITY: WITHIN THE PAST 12 MONTHS, THE FOOD YOU BOUGHT JUST DIDN'T LAST AND YOU DIDN'T HAVE MONEY TO GET MORE.: NEVER TRUE

## 2024-09-23 RX ORDER — ESTRADIOL 10 UG/1
INSERT VAGINAL
Qty: 90 TABLET | Refills: 0 | Status: SHIPPED | OUTPATIENT
Start: 2024-09-23

## 2024-09-24 ENCOUNTER — OFFICE VISIT (OUTPATIENT)
Dept: FAMILY MEDICINE CLINIC | Age: 69
End: 2024-09-24
Payer: MEDICARE

## 2024-09-24 VITALS
SYSTOLIC BLOOD PRESSURE: 128 MMHG | OXYGEN SATURATION: 97 % | HEART RATE: 74 BPM | BODY MASS INDEX: 38.26 KG/M2 | DIASTOLIC BLOOD PRESSURE: 70 MMHG | TEMPERATURE: 97.5 F | WEIGHT: 216 LBS

## 2024-09-24 DIAGNOSIS — Z23 NEED FOR PROPHYLACTIC VACCINATION AGAINST DIPHTHERIA-TETANUS-PERTUSSIS (DTP): ICD-10-CM

## 2024-09-24 DIAGNOSIS — Z00.00 INITIAL MEDICARE ANNUAL WELLNESS VISIT: Primary | ICD-10-CM

## 2024-09-24 DIAGNOSIS — Z78.0 ASYMPTOMATIC MENOPAUSAL STATE: ICD-10-CM

## 2024-09-24 DIAGNOSIS — E11.22 TYPE 2 DIABETES MELLITUS WITH STAGE 3A CHRONIC KIDNEY DISEASE, WITHOUT LONG-TERM CURRENT USE OF INSULIN (HCC): ICD-10-CM

## 2024-09-24 DIAGNOSIS — E11.9 TYPE 2 DIABETES MELLITUS WITHOUT COMPLICATION, WITHOUT LONG-TERM CURRENT USE OF INSULIN (HCC): ICD-10-CM

## 2024-09-24 DIAGNOSIS — N18.31 TYPE 2 DIABETES MELLITUS WITH STAGE 3A CHRONIC KIDNEY DISEASE, WITHOUT LONG-TERM CURRENT USE OF INSULIN (HCC): ICD-10-CM

## 2024-09-24 PROCEDURE — 3074F SYST BP LT 130 MM HG: CPT | Performed by: INTERNAL MEDICINE

## 2024-09-24 PROCEDURE — 3017F COLORECTAL CA SCREEN DOC REV: CPT | Performed by: INTERNAL MEDICINE

## 2024-09-24 PROCEDURE — 1123F ACP DISCUSS/DSCN MKR DOCD: CPT | Performed by: INTERNAL MEDICINE

## 2024-09-24 PROCEDURE — 3078F DIAST BP <80 MM HG: CPT | Performed by: INTERNAL MEDICINE

## 2024-09-24 PROCEDURE — G0438 PPPS, INITIAL VISIT: HCPCS | Performed by: INTERNAL MEDICINE

## 2024-09-24 PROCEDURE — 3044F HG A1C LEVEL LT 7.0%: CPT | Performed by: INTERNAL MEDICINE

## 2024-10-16 ENCOUNTER — HOSPITAL ENCOUNTER (OUTPATIENT)
Age: 69
Setting detail: SPECIMEN
Discharge: HOME | End: 2024-10-16

## 2024-10-16 DIAGNOSIS — Z78.0 ASYMPTOMATIC MENOPAUSAL STATE: ICD-10-CM

## 2024-10-16 DIAGNOSIS — Z23 NEED FOR PROPHYLACTIC VACCINATION AGAINST DIPHTHERIA-TETANUS-PERTUSSIS (DTP): ICD-10-CM

## 2024-10-16 DIAGNOSIS — C64.1 RENAL CELL CARCINOMA OF RIGHT KIDNEY (HCC): ICD-10-CM

## 2024-10-16 DIAGNOSIS — Z00.00 INITIAL MEDICARE ANNUAL WELLNESS VISIT: ICD-10-CM

## 2024-10-16 DIAGNOSIS — E11.9 TYPE 2 DIABETES MELLITUS WITHOUT COMPLICATION, WITHOUT LONG-TERM CURRENT USE OF INSULIN (HCC): ICD-10-CM

## 2024-10-16 LAB
ALBUMIN SERPL-MCNC: 4.8 G/DL (ref 3.5–5.2)
ALBUMIN/GLOB SERPL: 2 {RATIO} (ref 1–2.5)
ALP SERPL-CCNC: 73 U/L (ref 35–104)
ALT SERPL-CCNC: 33 U/L (ref 10–35)
ANION GAP SERPL CALCULATED.3IONS-SCNC: 11 MMOL/L (ref 9–16)
AST SERPL-CCNC: 32 U/L (ref 10–35)
BILIRUB SERPL-MCNC: 0.5 MG/DL (ref 0–1.2)
BUN SERPL-MCNC: 23 MG/DL (ref 8–23)
CALCIUM SERPL-MCNC: 9.9 MG/DL (ref 8.6–10.4)
CHLORIDE SERPL-SCNC: 106 MMOL/L (ref 98–107)
CHOLEST SERPL-MCNC: 135 MG/DL (ref 0–199)
CHOLESTEROL/HDL RATIO: 3
CO2 SERPL-SCNC: 26 MMOL/L (ref 20–31)
CREAT SERPL-MCNC: 1.4 MG/DL (ref 0.5–0.9)
EST. AVERAGE GLUCOSE BLD GHB EST-MCNC: 123 MG/DL
GFR, ESTIMATED: 40 ML/MIN/1.73M2
GLUCOSE SERPL-MCNC: 118 MG/DL (ref 74–99)
HBA1C MFR BLD: 5.9 % (ref 4–6)
HDLC SERPL-MCNC: 41 MG/DL
LDLC SERPL CALC-MCNC: 56 MG/DL (ref 0–100)
POTASSIUM SERPL-SCNC: 4.7 MMOL/L (ref 3.7–5.3)
PROT SERPL-MCNC: 7.5 G/DL (ref 6.6–8.7)
SODIUM SERPL-SCNC: 143 MMOL/L (ref 136–145)
TRIGL SERPL-MCNC: 186 MG/DL
TSH SERPL DL<=0.05 MIU/L-ACNC: 1.06 UIU/ML (ref 0.27–4.2)
VLDLC SERPL CALC-MCNC: 37 MG/DL

## 2024-11-05 ENCOUNTER — TELEPHONE (OUTPATIENT)
Dept: FAMILY MEDICINE CLINIC | Age: 69
End: 2024-11-05

## 2024-11-05 RX ORDER — ALBUTEROL SULFATE 90 UG/1
2 INHALANT RESPIRATORY (INHALATION) EVERY 6 HOURS PRN
Qty: 18 G | Refills: 0 | Status: SHIPPED | OUTPATIENT
Start: 2024-11-05

## 2024-11-05 NOTE — TELEPHONE ENCOUNTER
Patient calling stating that she stared using a CPaP a month ago and ever since she has had a dry wheezy cough. Non-productive. Please advise

## 2024-11-05 NOTE — TELEPHONE ENCOUNTER
Patient notified and she stated she already spoke with the specialist and they told her to call her PCP. She will try the inhaler and will call us back if needed.

## 2024-11-13 ENCOUNTER — TELEPHONE (OUTPATIENT)
Dept: FAMILY MEDICINE CLINIC | Age: 69
End: 2024-11-13

## 2024-11-13 DIAGNOSIS — M54.2 NECK PAIN: Primary | ICD-10-CM

## 2024-11-13 NOTE — TELEPHONE ENCOUNTER
Patient calling  to ask for a order for a x-ray of neck  She states she was having some pain and stiffness thats been radiating down to her rt hand (thinks its a pinch nerve)

## 2024-11-14 ENCOUNTER — HOSPITAL ENCOUNTER (OUTPATIENT)
Age: 69
Discharge: HOME | End: 2024-11-16
Payer: MEDICARE

## 2024-11-14 ENCOUNTER — HOSPITAL ENCOUNTER (OUTPATIENT)
Dept: GENERAL RADIOLOGY | Age: 69
Discharge: HOME | End: 2024-11-16
Payer: MEDICARE

## 2024-11-14 DIAGNOSIS — M54.2 NECK PAIN: ICD-10-CM

## 2024-11-14 PROCEDURE — 72040 X-RAY EXAM NECK SPINE 2-3 VW: CPT

## 2024-11-18 ENCOUNTER — PATIENT MESSAGE (OUTPATIENT)
Dept: FAMILY MEDICINE CLINIC | Age: 69
End: 2024-11-18

## 2024-11-18 DIAGNOSIS — M54.2 NECK PAIN: Primary | ICD-10-CM

## 2024-11-27 ENCOUNTER — PATIENT MESSAGE (OUTPATIENT)
Dept: FAMILY MEDICINE CLINIC | Age: 69
End: 2024-11-27

## 2024-12-01 DIAGNOSIS — G47.30 SLEEP APNEA, UNSPECIFIED TYPE: Primary | ICD-10-CM

## 2024-12-02 NOTE — TELEPHONE ENCOUNTER
Kodak Arnett is calling to request a refill on the following medication(s):    Medication Request:  Requested Prescriptions     Pending Prescriptions Disp Refills    escitalopram (LEXAPRO) 10 MG tablet [Pharmacy Med Name: Escitalopram Oxalate Oral Tablet 10 MG] 90 tablet 0     Sig: TAKE ONE TABLET BY MOUTH ONE TIME DAILY       Last Visit Date (If Applicable):  9/24/2024    Next Visit Date:    Visit date not found

## 2024-12-03 RX ORDER — ESCITALOPRAM OXALATE 10 MG/1
TABLET ORAL
Qty: 90 TABLET | Refills: 0 | Status: SHIPPED | OUTPATIENT
Start: 2024-12-03

## 2024-12-09 ENCOUNTER — PATIENT MESSAGE (OUTPATIENT)
Dept: FAMILY MEDICINE CLINIC | Age: 69
End: 2024-12-09

## 2024-12-10 RX ORDER — AMLODIPINE BESYLATE 2.5 MG/1
2.5 TABLET ORAL DAILY
Qty: 30 TABLET | Refills: 3 | Status: SHIPPED | OUTPATIENT
Start: 2024-12-10

## 2024-12-16 RX ORDER — LOSARTAN POTASSIUM 100 MG/1
100 TABLET ORAL DAILY
Qty: 90 TABLET | Refills: 0 | Status: SHIPPED | OUTPATIENT
Start: 2024-12-16

## 2024-12-16 NOTE — TELEPHONE ENCOUNTER
Kodak Arnett is calling to request a refill on the following medication(s):    Medication Request:  Requested Prescriptions     Pending Prescriptions Disp Refills    losartan (COZAAR) 100 MG tablet [Pharmacy Med Name: Losartan Potassium Oral Tablet 100 MG] 90 tablet 0     Sig: TAKE ONE TABLET BY MOUTH ONE TIME DAILY       Last Visit Date (If Applicable):  9/24/2024    Next Visit Date:    Visit date not found

## 2024-12-18 RX ORDER — ATENOLOL 50 MG/1
TABLET ORAL
Qty: 180 TABLET | Refills: 0 | Status: SHIPPED | OUTPATIENT
Start: 2024-12-18

## 2024-12-18 NOTE — TELEPHONE ENCOUNTER
Kodak Arnett is calling to request a refill on the following medication(s):    Medication Request:  Requested Prescriptions     Pending Prescriptions Disp Refills    atenolol (TENORMIN) 50 MG tablet 180 tablet 0     Sig: TAKE ONE TABLET BY MOUTH TWICE DAILY       Last Visit Date (If Applicable):  9/24/2024    Next Visit Date:    Visit date not found

## 2024-12-18 NOTE — TELEPHONE ENCOUNTER
Kodak Arnett is calling to request a refill on the following medication(s):    Medication Request:  Requested Prescriptions     Pending Prescriptions Disp Refills    linagliptin (TRADJENTA) 5 MG tablet 90 tablet 0     Sig: Take 1 tablet by mouth daily       Last Visit Date (If Applicable):  9/24/2024    Next Visit Date:    12/18/2024

## 2024-12-27 ENCOUNTER — TELEPHONE (OUTPATIENT)
Dept: FAMILY MEDICINE CLINIC | Age: 69
End: 2024-12-27

## 2024-12-27 DIAGNOSIS — G47.30 SLEEP APNEA, UNSPECIFIED TYPE: Primary | ICD-10-CM

## 2024-12-31 ENCOUNTER — INITIAL CONSULT (OUTPATIENT)
Dept: PAIN MANAGEMENT | Age: 69
End: 2024-12-31
Payer: MEDICARE

## 2024-12-31 VITALS — WEIGHT: 212 LBS | HEIGHT: 63 IN | BODY MASS INDEX: 37.56 KG/M2

## 2024-12-31 DIAGNOSIS — M47.812 CERVICAL SPONDYLOSIS: ICD-10-CM

## 2024-12-31 DIAGNOSIS — M48.02 SPINAL STENOSIS IN CERVICAL REGION: Primary | ICD-10-CM

## 2024-12-31 PROCEDURE — G8417 CALC BMI ABV UP PARAM F/U: HCPCS | Performed by: ANESTHESIOLOGY

## 2024-12-31 PROCEDURE — 1159F MED LIST DOCD IN RCRD: CPT | Performed by: ANESTHESIOLOGY

## 2024-12-31 PROCEDURE — 1123F ACP DISCUSS/DSCN MKR DOCD: CPT | Performed by: ANESTHESIOLOGY

## 2024-12-31 PROCEDURE — 3017F COLORECTAL CA SCREEN DOC REV: CPT | Performed by: ANESTHESIOLOGY

## 2024-12-31 PROCEDURE — G8427 DOCREV CUR MEDS BY ELIG CLIN: HCPCS | Performed by: ANESTHESIOLOGY

## 2024-12-31 PROCEDURE — 99204 OFFICE O/P NEW MOD 45 MIN: CPT | Performed by: ANESTHESIOLOGY

## 2024-12-31 PROCEDURE — 1160F RVW MEDS BY RX/DR IN RCRD: CPT | Performed by: ANESTHESIOLOGY

## 2024-12-31 PROCEDURE — 1125F AMNT PAIN NOTED PAIN PRSNT: CPT | Performed by: ANESTHESIOLOGY

## 2024-12-31 PROCEDURE — G8400 PT W/DXA NO RESULTS DOC: HCPCS | Performed by: ANESTHESIOLOGY

## 2024-12-31 PROCEDURE — 1090F PRES/ABSN URINE INCON ASSESS: CPT | Performed by: ANESTHESIOLOGY

## 2024-12-31 PROCEDURE — G8484 FLU IMMUNIZE NO ADMIN: HCPCS | Performed by: ANESTHESIOLOGY

## 2024-12-31 PROCEDURE — 1036F TOBACCO NON-USER: CPT | Performed by: ANESTHESIOLOGY

## 2024-12-31 RX ORDER — GABAPENTIN 400 MG/1
400 CAPSULE ORAL EVERY EVENING
Qty: 30 CAPSULE | Refills: 1 | Status: SHIPPED | OUTPATIENT
Start: 2024-12-31 | End: 2025-03-01

## 2024-12-31 ASSESSMENT — ENCOUNTER SYMPTOMS
RESPIRATORY NEGATIVE: 1
GASTROINTESTINAL NEGATIVE: 1

## 2024-12-31 NOTE — PROGRESS NOTES
The patient is a 69 y.o.Non- / non  female.    Chief Complaint   Patient presents with    Consultation    Neck Pain      Pain History  This is a pleasant 69-year-old female with history of chronic neck pain going on for many years progressively worsening flared up for last 6 months located over the axial cervical spine on both side extends over the trapezius to the shoulder and in between the shoulder blade  Reports no significant movement of the pain in arm  No associated dermatomal numbness or paresthesia  Pain is constant aching throbbing stabbing sensation  Past history significant for whiplash injury  Pain aggravated with neck movement looking up or down neck makes popping sounds  Denies any loss of bladder or bowel control  No previous cervical spine surgical history  No recent physical therapy  Patient have tried muscle relaxant in past of side effects of sedation  have not tried gabapentin in past   saw pain clinic [facility] a year ago and received a cervical epidural injection which she found very helpful  Last diagnostic workup was MRI cervical spine in 2015 that reported severe multilevel disc degenerative changes with spondylosis and moderate cervical spinal stenosis at C5      Pain score today: 9  1. Location: neck  2. Radiation: both shoulders  3. Character: burning, achy, shooting   5. Duration: varies with movement   6. Onset: 6 months  7. Did an injury cause pain: unknown- hx of whiplash- years ago   8. Aggravating factors: twisting, looking up or down; neck pops  9. Alleviating factors: position  10. Associated symptoms (numbness / tingling / weakness): No  -Where at:   -Down into finger tips or toes (specify which finger or toes):   -constant or intermitting:  comes and goes  11. Red Flags: (weight loss / chills / loss of bladder or bowel control):      Previous management history  1. Previous diagnostic workup: (Imaging/EMG)   CT, MRI, or Xray: Xray  What part of the body:

## 2025-01-06 ENCOUNTER — HOSPITAL ENCOUNTER (OUTPATIENT)
Dept: MRI IMAGING | Facility: CLINIC | Age: 70
Discharge: HOME | End: 2025-01-08
Attending: ANESTHESIOLOGY
Payer: MEDICARE

## 2025-01-06 DIAGNOSIS — M48.02 SPINAL STENOSIS IN CERVICAL REGION: ICD-10-CM

## 2025-01-06 DIAGNOSIS — M47.812 CERVICAL SPONDYLOSIS: ICD-10-CM

## 2025-01-06 PROCEDURE — 72141 MRI NECK SPINE W/O DYE: CPT

## 2025-01-21 ENCOUNTER — OFFICE VISIT (OUTPATIENT)
Dept: FAMILY MEDICINE CLINIC | Age: 70
End: 2025-01-21
Payer: MEDICARE

## 2025-01-21 VITALS
BODY MASS INDEX: 37.91 KG/M2 | DIASTOLIC BLOOD PRESSURE: 82 MMHG | SYSTOLIC BLOOD PRESSURE: 122 MMHG | TEMPERATURE: 97.6 F | WEIGHT: 214 LBS | OXYGEN SATURATION: 96 % | HEART RATE: 83 BPM

## 2025-01-21 DIAGNOSIS — R05.9 COUGH IN ADULT: Primary | ICD-10-CM

## 2025-01-21 DIAGNOSIS — J06.9 VIRAL URI: ICD-10-CM

## 2025-01-21 LAB
INFLUENZA A ANTIBODY: NORMAL
INFLUENZA B ANTIBODY: NORMAL
Lab: NORMAL
QC PASS/FAIL: NORMAL
SARS-COV-2 RDRP RESP QL NAA+PROBE: NEGATIVE

## 2025-01-21 PROCEDURE — 87804 INFLUENZA ASSAY W/OPTIC: CPT | Performed by: NURSE PRACTITIONER

## 2025-01-21 PROCEDURE — 1123F ACP DISCUSS/DSCN MKR DOCD: CPT | Performed by: NURSE PRACTITIONER

## 2025-01-21 PROCEDURE — 87635 SARS-COV-2 COVID-19 AMP PRB: CPT | Performed by: NURSE PRACTITIONER

## 2025-01-21 PROCEDURE — 3074F SYST BP LT 130 MM HG: CPT | Performed by: NURSE PRACTITIONER

## 2025-01-21 PROCEDURE — 3017F COLORECTAL CA SCREEN DOC REV: CPT | Performed by: NURSE PRACTITIONER

## 2025-01-21 PROCEDURE — 1159F MED LIST DOCD IN RCRD: CPT | Performed by: NURSE PRACTITIONER

## 2025-01-21 PROCEDURE — 1036F TOBACCO NON-USER: CPT | Performed by: NURSE PRACTITIONER

## 2025-01-21 PROCEDURE — 3079F DIAST BP 80-89 MM HG: CPT | Performed by: NURSE PRACTITIONER

## 2025-01-21 PROCEDURE — 1090F PRES/ABSN URINE INCON ASSESS: CPT | Performed by: NURSE PRACTITIONER

## 2025-01-21 PROCEDURE — G8400 PT W/DXA NO RESULTS DOC: HCPCS | Performed by: NURSE PRACTITIONER

## 2025-01-21 PROCEDURE — 99213 OFFICE O/P EST LOW 20 MIN: CPT | Performed by: NURSE PRACTITIONER

## 2025-01-21 PROCEDURE — G8427 DOCREV CUR MEDS BY ELIG CLIN: HCPCS | Performed by: NURSE PRACTITIONER

## 2025-01-21 PROCEDURE — 1160F RVW MEDS BY RX/DR IN RCRD: CPT | Performed by: NURSE PRACTITIONER

## 2025-01-21 PROCEDURE — G8417 CALC BMI ABV UP PARAM F/U: HCPCS | Performed by: NURSE PRACTITIONER

## 2025-01-21 RX ORDER — FLUTICASONE PROPIONATE 50 MCG
1 SPRAY, SUSPENSION (ML) NASAL DAILY
Qty: 16 G | Refills: 0 | Status: SHIPPED | OUTPATIENT
Start: 2025-01-21

## 2025-01-21 RX ORDER — LORATADINE 10 MG/1
10 TABLET ORAL DAILY
Qty: 30 TABLET | Refills: 0 | Status: SHIPPED | OUTPATIENT
Start: 2025-01-21 | End: 2025-02-20

## 2025-01-21 SDOH — ECONOMIC STABILITY: FOOD INSECURITY: WITHIN THE PAST 12 MONTHS, THE FOOD YOU BOUGHT JUST DIDN'T LAST AND YOU DIDN'T HAVE MONEY TO GET MORE.: NEVER TRUE

## 2025-01-21 SDOH — ECONOMIC STABILITY: FOOD INSECURITY: WITHIN THE PAST 12 MONTHS, YOU WORRIED THAT YOUR FOOD WOULD RUN OUT BEFORE YOU GOT MONEY TO BUY MORE.: NEVER TRUE

## 2025-01-21 ASSESSMENT — PATIENT HEALTH QUESTIONNAIRE - PHQ9
SUM OF ALL RESPONSES TO PHQ QUESTIONS 1-9: 0
SUM OF ALL RESPONSES TO PHQ QUESTIONS 1-9: 0
6. FEELING BAD ABOUT YOURSELF - OR THAT YOU ARE A FAILURE OR HAVE LET YOURSELF OR YOUR FAMILY DOWN: NOT AT ALL
8. MOVING OR SPEAKING SO SLOWLY THAT OTHER PEOPLE COULD HAVE NOTICED. OR THE OPPOSITE, BEING SO FIGETY OR RESTLESS THAT YOU HAVE BEEN MOVING AROUND A LOT MORE THAN USUAL: NOT AT ALL
5. POOR APPETITE OR OVEREATING: NOT AT ALL
4. FEELING TIRED OR HAVING LITTLE ENERGY: NOT AT ALL
2. FEELING DOWN, DEPRESSED OR HOPELESS: NOT AT ALL
SUM OF ALL RESPONSES TO PHQ QUESTIONS 1-9: 0
SUM OF ALL RESPONSES TO PHQ9 QUESTIONS 1 & 2: 0
SUM OF ALL RESPONSES TO PHQ QUESTIONS 1-9: 0
10. IF YOU CHECKED OFF ANY PROBLEMS, HOW DIFFICULT HAVE THESE PROBLEMS MADE IT FOR YOU TO DO YOUR WORK, TAKE CARE OF THINGS AT HOME, OR GET ALONG WITH OTHER PEOPLE: NOT DIFFICULT AT ALL
7. TROUBLE CONCENTRATING ON THINGS, SUCH AS READING THE NEWSPAPER OR WATCHING TELEVISION: NOT AT ALL
9. THOUGHTS THAT YOU WOULD BE BETTER OFF DEAD, OR OF HURTING YOURSELF: NOT AT ALL
1. LITTLE INTEREST OR PLEASURE IN DOING THINGS: NOT AT ALL

## 2025-01-21 ASSESSMENT — ENCOUNTER SYMPTOMS
SINUS PRESSURE: 1
SORE THROAT: 1
COUGH: 1

## 2025-01-21 NOTE — PROGRESS NOTES
[facility]  [address]  [place]  [address]  [phone]  [phone]    Kodak WOODS Melquiades is a 69 y.o. female who is here with c/o of:    Chief Complaint: Cough (Pt c/o Productive cough sinus drainage  x 3 - 4 days)      Patient Accompanied by: n/a    HPI - Kodak WOODS Melquiades is here today with c/o:    History of Present Illness  The patient is a 69-year-old female who presents with complaints of cough and cold symptoms.    She reports the onset of an unusual cough since Friday, accompanied by nasal congestion. She describes the sensation as being localized to her throat rather than her lungs. She experienced transient chills but has not had any fever. The initial symptom was a sore throat, which has since resolved. She does not report any ear pain or generalized body aches. She has been self-medicating with Coricidin due to her history of hypertension, but finding it ineffective, she switched to Mucinex. Despite this change, her symptoms persist, prompting her to seek medical attention. She also reports hoarseness and a temporary loss of voice, which is gradually returning. She notes that she can hear a rattling sound when inhaling steam in the shower, suggesting possible lung involvement. Additionally, she mentions occasional greenish sputum when blowing her nose, although the majority of her nasal discharge is clear. 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    SOCIAL HISTORY  She does not smoke.    MEDICATIONS  Current: Mucinex  Discontinued: Coricidin    IMMUNIZATIONS  She had a flu shot.       Health Maintenance Due   Topic Date Due    Diabetic foot exam  Never done    DEXA (modify frequency per FRAX score)  Never done    Diabetic retinal exam  01/21/2020    Diabetic Alb to Cr ratio (uACR) test  02/14/2025        Patient Active Problem List:     Tachycardia     HTN (hypertension)     Fibromyalgia     Chronic UTI

## 2025-01-28 ENCOUNTER — OFFICE VISIT (OUTPATIENT)
Age: 70
End: 2025-01-28
Payer: MEDICARE

## 2025-01-28 VITALS
DIASTOLIC BLOOD PRESSURE: 70 MMHG | HEART RATE: 72 BPM | OXYGEN SATURATION: 100 % | SYSTOLIC BLOOD PRESSURE: 122 MMHG | WEIGHT: 207 LBS | TEMPERATURE: 97.5 F | BODY MASS INDEX: 36.67 KG/M2

## 2025-01-28 DIAGNOSIS — E11.9 TYPE 2 DIABETES MELLITUS WITHOUT COMPLICATION, WITHOUT LONG-TERM CURRENT USE OF INSULIN (HCC): ICD-10-CM

## 2025-01-28 DIAGNOSIS — N18.31 STAGE 3A CHRONIC KIDNEY DISEASE (HCC): ICD-10-CM

## 2025-01-28 DIAGNOSIS — C64.1 RENAL CELL CARCINOMA OF RIGHT KIDNEY (HCC): ICD-10-CM

## 2025-01-28 DIAGNOSIS — M10.9 ACUTE GOUT OF RIGHT ANKLE, UNSPECIFIED CAUSE: Primary | ICD-10-CM

## 2025-01-28 PROCEDURE — 2022F DILAT RTA XM EVC RTNOPTHY: CPT

## 2025-01-28 PROCEDURE — 3046F HEMOGLOBIN A1C LEVEL >9.0%: CPT

## 2025-01-28 PROCEDURE — 3078F DIAST BP <80 MM HG: CPT

## 2025-01-28 PROCEDURE — 1036F TOBACCO NON-USER: CPT

## 2025-01-28 PROCEDURE — G8427 DOCREV CUR MEDS BY ELIG CLIN: HCPCS

## 2025-01-28 PROCEDURE — 1123F ACP DISCUSS/DSCN MKR DOCD: CPT

## 2025-01-28 PROCEDURE — G8417 CALC BMI ABV UP PARAM F/U: HCPCS

## 2025-01-28 PROCEDURE — 3017F COLORECTAL CA SCREEN DOC REV: CPT

## 2025-01-28 PROCEDURE — 99214 OFFICE O/P EST MOD 30 MIN: CPT

## 2025-01-28 PROCEDURE — 1090F PRES/ABSN URINE INCON ASSESS: CPT

## 2025-01-28 PROCEDURE — G8400 PT W/DXA NO RESULTS DOC: HCPCS

## 2025-01-28 PROCEDURE — 3074F SYST BP LT 130 MM HG: CPT

## 2025-01-28 PROCEDURE — 1159F MED LIST DOCD IN RCRD: CPT

## 2025-01-28 RX ORDER — METHYLPREDNISOLONE 4 MG/1
TABLET ORAL
Qty: 1 KIT | Refills: 0 | Status: SHIPPED | OUTPATIENT
Start: 2025-01-28 | End: 2025-02-03

## 2025-01-28 RX ORDER — COLCHICINE 0.6 MG/1
0.6 TABLET ORAL DAILY PRN
Qty: 10 TABLET | Refills: 3 | Status: SHIPPED | OUTPATIENT
Start: 2025-01-28

## 2025-01-28 NOTE — ASSESSMENT & PLAN NOTE
Acute condition, recurrent, The patient's uric acid levels have consistently been elevated, with a notable increase to 12.1 in 2012 and a subsequent decrease to 6.1 in 2023. This elevation, coupled with her symptoms, suggests a potential diagnosis of gout. However, a definitive diagnosis requires either joint fluid aspiration for crystal analysis or the occurrence of three gout attacks per year. Given her history of renal cell carcinoma and the potential impact of colchicine on kidney function, a Medrol pack will be prescribed. Her blood work is updated from October 2024. She is advised to take the Medrol pack after lunch. If the Medrol pack does not alleviate the pain, she may take one tablet of colchicine as needed. If the pain persists after one hour, she may take an additional tablet, but should not exceed this dosage. She is also advised to monitor for any signs of infection, such as increased swelling, redness, or fever, and to report these symptoms immediately.    Orders:    methylPREDNISolone (MEDROL, ENEIDA,) 4 MG tablet; Take by mouth.    colchicine (COLCRYS) 0.6 MG tablet; Take 1 tablet by mouth daily as needed for Pain Can take another tablet after one hour if not helping

## 2025-01-28 NOTE — ASSESSMENT & PLAN NOTE
Chronic, at goal (stable),   She had renal cell carcinoma in her right kidney, which was removed in May 2024. She is under the care of a nephrologist and is scheduled for follow-up visits every 6 months. Her nephrologist is not concerned about her kidney function at this time.

## 2025-01-28 NOTE — ASSESSMENT & PLAN NOTE
Resolved added to the history, She had renal cell carcinoma in her right kidney, which was removed in May 2024. She is under the care of a nephrologist and is scheduled for follow-up visits every 6 months. Her nephrologist is not concerned about her kidney function at this time.    Orders:    methylPREDNISolone (MEDROL, ENEIDA,) 4 MG tablet; Take by mouth.

## 2025-01-28 NOTE — ASSESSMENT & PLAN NOTE
Chronic, at goal (stable), Her A1C level is well-controlled at 5.6. She is advised to continue her current diabetes management plan., medication adherence emphasized, and lifestyle modifications recommended

## 2025-01-28 NOTE — PROGRESS NOTES
Kodak Arnett (:  1955) is a 69 y.o. female,Established patient, here for evaluation of the following chief complaint(s):  Gait Concerns (Right Foot Swollen. Started on Friday)          Assessment & Plan  Acute gout of right ankle, unspecified cause   Acute condition, recurrent, The patient's uric acid levels have consistently been elevated, with a notable increase to 12.1 in  and a subsequent decrease to 6.1 in . This elevation, coupled with her symptoms, suggests a potential diagnosis of gout. However, a definitive diagnosis requires either joint fluid aspiration for crystal analysis or the occurrence of three gout attacks per year. Given her history of renal cell carcinoma and the potential impact of colchicine on kidney function, a Medrol pack will be prescribed. Her blood work is updated from 2024. She is advised to take the Medrol pack after lunch. If the Medrol pack does not alleviate the pain, she may take one tablet of colchicine as needed. If the pain persists after one hour, she may take an additional tablet, but should not exceed this dosage. She is also advised to monitor for any signs of infection, such as increased swelling, redness, or fever, and to report these symptoms immediately.    Orders:    methylPREDNISolone (MEDROL, ENEIDA,) 4 MG tablet; Take by mouth.    colchicine (COLCRYS) 0.6 MG tablet; Take 1 tablet by mouth daily as needed for Pain Can take another tablet after one hour if not helping    Renal cell carcinoma of right kidney (HCC)    Resolved added to the history, She had renal cell carcinoma in her right kidney, which was removed in May 2024. She is under the care of a nephrologist and is scheduled for follow-up visits every 6 months. Her nephrologist is not concerned about her kidney function at this time.    Orders:    methylPREDNISolone (MEDROL, ENEIDA,) 4 MG tablet; Take by mouth.    Type 2 diabetes mellitus without complication, without long-term

## 2025-02-04 ENCOUNTER — HOSPITAL ENCOUNTER (OUTPATIENT)
Facility: CLINIC | Age: 70
Discharge: HOME | End: 2025-02-06
Payer: MEDICARE

## 2025-02-04 ENCOUNTER — HOSPITAL ENCOUNTER (OUTPATIENT)
Dept: GENERAL RADIOLOGY | Facility: CLINIC | Age: 70
Discharge: HOME | End: 2025-02-06
Payer: MEDICARE

## 2025-02-04 ENCOUNTER — HOSPITAL ENCOUNTER (OUTPATIENT)
Facility: CLINIC | Age: 70
Discharge: HOME | End: 2025-02-04
Payer: MEDICARE

## 2025-02-04 DIAGNOSIS — M10.9 ACUTE GOUT OF RIGHT ANKLE, UNSPECIFIED CAUSE: ICD-10-CM

## 2025-02-04 LAB — URATE SERPL-MCNC: 6.9 MG/DL (ref 2.4–5.7)

## 2025-02-04 PROCEDURE — 84550 ASSAY OF BLOOD/URIC ACID: CPT

## 2025-02-04 PROCEDURE — 36415 COLL VENOUS BLD VENIPUNCTURE: CPT

## 2025-02-04 PROCEDURE — 73600 X-RAY EXAM OF ANKLE: CPT

## 2025-02-06 NOTE — RESULT ENCOUNTER NOTE
Uric acid elevated I recommend starting on Gout medicine for prophylaxis after her current relapse resolve. Please rout me back pt response

## 2025-02-19 ENCOUNTER — TELEPHONE (OUTPATIENT)
Dept: FAMILY MEDICINE CLINIC | Age: 70
End: 2025-02-19

## 2025-02-19 NOTE — TELEPHONE ENCOUNTER
Back Pain    Patient complaining of symptoms of  back pain when walking getting  This has persisted for 2 weeks  This was not a personal injury  Are there any known injuries to the area No  Location back    Pain scale 1-10 10/10  Incontinence of bowel or bladder No  Radiation of pain down arm or leg No  Other symptoms such as fever or chills No  Did anything help with the pain sitting  Did anything make pain worse walking    *This condition is eligible for an eVisit. If not already active, sign patient up for MyChart to improve access and communication with the provider.*

## 2025-02-25 ENCOUNTER — OFFICE VISIT (OUTPATIENT)
Age: 70
End: 2025-02-25
Payer: MEDICARE

## 2025-02-25 ENCOUNTER — HOSPITAL ENCOUNTER (OUTPATIENT)
Dept: GENERAL RADIOLOGY | Facility: CLINIC | Age: 70
Discharge: HOME | End: 2025-02-27
Payer: MEDICARE

## 2025-02-25 VITALS
HEART RATE: 92 BPM | DIASTOLIC BLOOD PRESSURE: 72 MMHG | SYSTOLIC BLOOD PRESSURE: 122 MMHG | BODY MASS INDEX: 37.02 KG/M2 | OXYGEN SATURATION: 98 % | TEMPERATURE: 97.3 F | WEIGHT: 209 LBS

## 2025-02-25 DIAGNOSIS — M54.50 ACUTE MIDLINE LOW BACK PAIN WITHOUT SCIATICA: Primary | ICD-10-CM

## 2025-02-25 DIAGNOSIS — M10.9 GOUT, UNSPECIFIED CAUSE, UNSPECIFIED CHRONICITY, UNSPECIFIED SITE: ICD-10-CM

## 2025-02-25 DIAGNOSIS — M54.50 ACUTE MIDLINE LOW BACK PAIN WITHOUT SCIATICA: ICD-10-CM

## 2025-02-25 PROCEDURE — 1123F ACP DISCUSS/DSCN MKR DOCD: CPT

## 2025-02-25 PROCEDURE — 3074F SYST BP LT 130 MM HG: CPT

## 2025-02-25 PROCEDURE — 99214 OFFICE O/P EST MOD 30 MIN: CPT

## 2025-02-25 PROCEDURE — 3017F COLORECTAL CA SCREEN DOC REV: CPT

## 2025-02-25 PROCEDURE — 1036F TOBACCO NON-USER: CPT

## 2025-02-25 PROCEDURE — 1159F MED LIST DOCD IN RCRD: CPT

## 2025-02-25 PROCEDURE — G8417 CALC BMI ABV UP PARAM F/U: HCPCS

## 2025-02-25 PROCEDURE — 72100 X-RAY EXAM L-S SPINE 2/3 VWS: CPT

## 2025-02-25 PROCEDURE — G8400 PT W/DXA NO RESULTS DOC: HCPCS

## 2025-02-25 PROCEDURE — 1090F PRES/ABSN URINE INCON ASSESS: CPT

## 2025-02-25 PROCEDURE — G8427 DOCREV CUR MEDS BY ELIG CLIN: HCPCS

## 2025-02-25 PROCEDURE — 3078F DIAST BP <80 MM HG: CPT

## 2025-02-25 RX ORDER — ONDANSETRON 4 MG/1
4 TABLET, ORALLY DISINTEGRATING ORAL 3 TIMES DAILY PRN
Qty: 21 TABLET | Refills: 0 | Status: SHIPPED | OUTPATIENT
Start: 2025-02-25

## 2025-02-25 RX ORDER — ACETAMINOPHEN AND CODEINE PHOSPHATE 300; 15 MG/1; MG/1
1 TABLET ORAL EVERY 8 HOURS PRN
Qty: 24 TABLET | Refills: 0 | Status: SHIPPED | OUTPATIENT
Start: 2025-02-25 | End: 2025-03-06

## 2025-02-25 RX ORDER — TIZANIDINE 2 MG/1
2 TABLET ORAL NIGHTLY PRN
Qty: 10 TABLET | Refills: 0 | Status: SHIPPED | OUTPATIENT
Start: 2025-02-25

## 2025-02-25 ASSESSMENT — ENCOUNTER SYMPTOMS: BACK PAIN: 1

## 2025-02-25 NOTE — ASSESSMENT & PLAN NOTE
Chronic, at goal (stable), did not benefit from steroid for the attacks, we can continue with colchicine for the tach, started recently on allopurinol for the prophylaxis of gout, medication adherence emphasized, and lifestyle modifications recommended

## 2025-02-25 NOTE — ASSESSMENT & PLAN NOTE
New, uncertain prognosis, The patient's symptoms suggest a potential diagnosis of sciatica, likely due to nerve impingement in the lower back region. An x-ray has been ordered to further investigate the cause of the pain. A prescription for tizanidine has been provided to manage the pain. Additionally, Tylenol 3, which contains codeine, has been prescribed for pain relief. She has been advised to avoid concurrent use of other Tylenol products while taking Tylenol 3, and to maintain a minimum interval of 8 hours between doses. A referral for physical therapy has been made, and she has been encouraged to engage in home exercises as tolerated. She has been instructed to report any adverse reactions to the medications. A prescription for Zofran has also been provided to manage potential nausea associated with Tylenol 3 use. If Zofran proves ineffective, she has been advised to discontinue Tylenol 3 and inform the clinic. Should there be no improvement in her condition, or if the x-ray reveals any concerning findings, an MRI will be considered.    Orders:    XR LUMBAR SPINE (2-3 VIEWS); Future    tiZANidine (ZANAFLEX) 2 MG tablet; Take 1 tablet by mouth nightly as needed (Muscle relaxant)    acetaminophen-Codeine (TYLENOL #2) 300-15 MG per tablet; Take 1 tablet by mouth every 8 hours as needed for Pain for up to 9 days. Max Daily Amount: 3 tablets    ondansetron (ZOFRAN-ODT) 4 MG disintegrating tablet; Take 1 tablet by mouth 3 times daily as needed for Nausea or Vomiting

## 2025-02-25 NOTE — PROGRESS NOTES
Kodak Arnett (:  1955) is a 69 y.o. female,Established patient, here for evaluation of the following chief complaint(s):  Back Pain            Assessment & Plan  Acute midline low back pain without sciatica   New, uncertain prognosis, The patient's symptoms suggest a potential diagnosis of sciatica, likely due to nerve impingement in the lower back region. An x-ray has been ordered to further investigate the cause of the pain. A prescription for tizanidine has been provided to manage the pain. Additionally, Tylenol 3, which contains codeine, has been prescribed for pain relief. She has been advised to avoid concurrent use of other Tylenol products while taking Tylenol 3, and to maintain a minimum interval of 8 hours between doses. A referral for physical therapy has been made, and she has been encouraged to engage in home exercises as tolerated. She has been instructed to report any adverse reactions to the medications. A prescription for Zofran has also been provided to manage potential nausea associated with Tylenol 3 use. If Zofran proves ineffective, she has been advised to discontinue Tylenol 3 and inform the clinic. Should there be no improvement in her condition, or if the x-ray reveals any concerning findings, an MRI will be considered.    Orders:    XR LUMBAR SPINE (2-3 VIEWS); Future    tiZANidine (ZANAFLEX) 2 MG tablet; Take 1 tablet by mouth nightly as needed (Muscle relaxant)    acetaminophen-Codeine (TYLENOL #2) 300-15 MG per tablet; Take 1 tablet by mouth every 8 hours as needed for Pain for up to 9 days. Max Daily Amount: 3 tablets    ondansetron (ZOFRAN-ODT) 4 MG disintegrating tablet; Take 1 tablet by mouth 3 times daily as needed for Nausea or Vomiting    Gout, unspecified cause, unspecified chronicity, unspecified site   Chronic, at goal (stable), did not benefit from steroid for the attacks, we can continue with colchicine for the tach, started recently on allopurinol for the

## 2025-02-26 ENCOUNTER — PATIENT MESSAGE (OUTPATIENT)
Age: 70
End: 2025-02-26

## 2025-02-26 DIAGNOSIS — M54.50 ACUTE MIDLINE LOW BACK PAIN WITHOUT SCIATICA: Primary | ICD-10-CM

## 2025-03-02 DIAGNOSIS — M54.50 ACUTE MIDLINE LOW BACK PAIN WITHOUT SCIATICA: Primary | ICD-10-CM

## 2025-03-06 DIAGNOSIS — G47.30 SLEEP APNEA, UNSPECIFIED TYPE: ICD-10-CM

## 2025-03-06 NOTE — TELEPHONE ENCOUNTER
Kodak Arnett is calling to request a refill on the following medication(s):    Medication Request:  Requested Prescriptions     Pending Prescriptions Disp Refills    escitalopram (LEXAPRO) 10 MG tablet [Pharmacy Med Name: Escitalopram Oxalate Oral Tablet 10 MG] 90 tablet 0     Sig: TAKE 1 TABLET BY MOUTH ONE TIME DAILY       Last Visit Date (If Applicable):  9/24/2024    Next Visit Date:    Visit date not found      Last refill 12/3/2024  Rx Pending

## 2025-03-07 RX ORDER — ESCITALOPRAM OXALATE 10 MG/1
TABLET ORAL
Qty: 90 TABLET | Refills: 1 | Status: SHIPPED | OUTPATIENT
Start: 2025-03-07

## 2025-03-10 ENCOUNTER — HOSPITAL ENCOUNTER (OUTPATIENT)
Dept: PHYSICAL THERAPY | Facility: CLINIC | Age: 70
Setting detail: THERAPIES SERIES
Discharge: HOME | End: 2025-03-10
Payer: MEDICARE

## 2025-03-10 PROCEDURE — 97140 MANUAL THERAPY 1/> REGIONS: CPT

## 2025-03-10 PROCEDURE — 97161 PT EVAL LOW COMPLEX 20 MIN: CPT

## 2025-03-10 NOTE — CONSULTS
[] [facility]  Outpatient Rehabilitation &  Therapy  [address]  [phone]  F: [phone] [] [facility]  Outpatient Rehabilitation &  Therapy  [address]   [address]  P: [phone]  F: [phone] [] [facility]  Outpatient Rehabilitation &  Therapy  [address]  P: [phone]  F: [phone] [] [facility]  Outpatient Rehabilitation &  Therapy  [address]  P: [phone]  F: [phone] [x] [facility]  Outpatient Rehabilitation &  Therapy  [address]   [address]   P: [phone]  F: [phone]        Physical Therapy Spine Evaluation    Date:  3/10/2025  Patient: Kodak Arnett  : 1955  MRN: [id number]  Physician: Dr. Montana Morin   Insurance: Medicare (secondary AARP, vs BMN)  Medical Diagnosis: Acute midline low back pain without sciatica (M54.50)    Rehab Codes: M62.81 , R26.89 , M25.651 , M25.652 , M25.552 , M54.5 , M62.830  Onset Date: 25     Next 's appt.: 25      Subjective:   CC/HPI: Patient is a 68 y/o female presenting to PT clinic with complaints of low back and left leg pain that has been going on for about 1 month. She does not recall any specific incident that caused her pain, started one morning upon waking. Saw PCP, was started on some medications. She reports improvement in pain when she takes ibuprofen and tylenol-3. No change in pain with steroid taper. X-ray completed, MRI scheduled for 3/13/25.   She denies hx of similar pain.   She reports having a \"sciatica\" attack about 10 years ago.      PMHx:   [] Unremarkable               [x] Refer to full medical chart  In EPIC     Past Medical History         Diagnosis Date Comments     Tachycardia [R00.0]       HTN (hypertension) [I10]       Fibromyalgia [M79.7]       Chronic UTI [N39.0]       IBS (irritable bowel syndrome) [K58.9]       Obesity [E66.9]       Unspecified sleep apnea

## 2025-03-13 ENCOUNTER — HOSPITAL ENCOUNTER (OUTPATIENT)
Dept: MRI IMAGING | Facility: CLINIC | Age: 70
Discharge: HOME | End: 2025-03-15
Payer: MEDICARE

## 2025-03-13 DIAGNOSIS — M54.50 ACUTE MIDLINE LOW BACK PAIN WITHOUT SCIATICA: ICD-10-CM

## 2025-03-13 PROCEDURE — 72148 MRI LUMBAR SPINE W/O DYE: CPT

## 2025-03-17 RX ORDER — ATENOLOL 50 MG/1
50 TABLET ORAL 2 TIMES DAILY
Qty: 180 TABLET | Refills: 1 | Status: SHIPPED | OUTPATIENT
Start: 2025-03-17

## 2025-03-17 RX ORDER — LOSARTAN POTASSIUM 100 MG/1
100 TABLET ORAL DAILY
Qty: 90 TABLET | Refills: 1 | Status: SHIPPED | OUTPATIENT
Start: 2025-03-17

## 2025-03-17 NOTE — TELEPHONE ENCOUNTER
Kodak Arnett is calling to request a refill on the following medication(s):    Medication Request:  Requested Prescriptions     Pending Prescriptions Disp Refills    losartan (COZAAR) 100 MG tablet [Pharmacy Med Name: Losartan Potassium Oral Tablet 100 MG] 90 tablet 0     Sig: TAKE ONE TABLET BY MOUTH ONE TIME DAILY    atenolol (TENORMIN) 50 MG tablet [Pharmacy Med Name: Atenolol Oral Tablet 50 MG] 180 tablet 0     Sig: TAKE ONE TABLET BY MOUTH TWICE DAILY       Last Visit Date (If Applicable):  9/24/2024    Next Visit Date:    4/7/2025    Last refilled 12/16/2025  Rx Pending

## 2025-03-18 ENCOUNTER — RESULTS FOLLOW-UP (OUTPATIENT)
Dept: MRI IMAGING | Facility: CLINIC | Age: 70
End: 2025-03-18

## 2025-03-18 ENCOUNTER — HOSPITAL ENCOUNTER (OUTPATIENT)
Dept: PHYSICAL THERAPY | Facility: CLINIC | Age: 70
Setting detail: THERAPIES SERIES
Discharge: HOME | End: 2025-03-18
Payer: MEDICARE

## 2025-03-18 DIAGNOSIS — M48.061 NEURAL FORAMINAL STENOSIS OF LUMBAR SPINE: ICD-10-CM

## 2025-03-18 DIAGNOSIS — M48.061 SPINAL STENOSIS OF LUMBAR REGION, UNSPECIFIED WHETHER NEUROGENIC CLAUDICATION PRESENT: Primary | ICD-10-CM

## 2025-03-18 PROCEDURE — 97140 MANUAL THERAPY 1/> REGIONS: CPT

## 2025-03-18 PROCEDURE — 97110 THERAPEUTIC EXERCISES: CPT

## 2025-03-18 NOTE — RESULT ENCOUNTER NOTE
MRI showed wear and tear in multiple areas most severe in L5-S1 where disc buldge pressing on the nerve also other level L3-L4 with some narrowing as well. I will put referral to pain management

## 2025-03-18 NOTE — FLOWSHEET NOTE
Increase MMT to 5/5 throughout to ease functional limitations and mobility  []  []  []                        Patient goals: less pain     Pt. Education:  [x] Yes  [] No  [x] Reviewed Prior HEP/Ed  Method of Education: [x] Verbal  [x] Demo  [] Written  Comprehension of Education:  [x] Verbalizes understanding.  [x] Demonstrates understanding.  [x] Needs review.  [] Demonstrates/verbalizes HEP/Ed previously given.     Plan: [x] Continue current frequency toward long and short term goals.    [x] Specific Instructions for subsequent treatments: see above         Time In:5:45p            Time Out: 6:40p      Electronically signed by:  aMllory Guajardo, PT

## 2025-03-21 ENCOUNTER — HOSPITAL ENCOUNTER (OUTPATIENT)
Dept: PHYSICAL THERAPY | Facility: CLINIC | Age: 70
Setting detail: THERAPIES SERIES
Discharge: HOME | End: 2025-03-21
Payer: MEDICARE

## 2025-03-21 NOTE — FLOWSHEET NOTE
[] [facility]  Outpatient Rehabilitation &  Therapy  [address]  [phone]  [phone] [] [facility]  Outpatient Rehabilitation &  Therapy  [address]  P: [phone]  F: [phone] [] [facility]  Outpatient Rehabilitation &  Therapy  [address]  P: [phone]  F: [phone] [] [facility]  Outpatient Rehabilitation &  Therapy  [address]  [phone]  [phone] [x] [facility]  Outpatient Rehabilitation &  Therapy  [address]   P: [phone]  F: [phone]  [] [facility]  Outpatient Rehabilitation &  Therapy  [address]  P: [phone]  F: [phone] [] [facility]  Outpatient Rehabilitation &  Therapy  [address]  P: [phone]  F: [phone] [] [facility]  Outpatient Rehabilitation &  Therapy  [address]  P: [phone]  F: [phone] [] [facility]  Outpatient Rehabilitation &  Therapy  [address]  P: [phone]  F: [phone]  [] [facility] Outpatient Rehabilitation &  Therapy  [address]  P: [phone]  F: [phone]     Therapy Cancel/No Show note    Date: 3/21/2025  Patient: Kodak Arnett  : 1955  MRN: [id number]    Cancels/No Shows to date: 1    For today's appointment patient:    [x]  Cancelled    [] Rescheduled appointment    [] No-show     Reason given by patient:    []  Patient ill    []  Conflicting appointment    [] No transportation      [] Conflict with work    [] No reason given    [] Weather related    [] COVID-19    [x] Other:      Comments:  Patient notes she has been referred to pain management and an orthopedic surgeon. Unsure if she is going to keep remainder of her appointments but will call back to clarify.      [x] Next

## 2025-03-25 ENCOUNTER — HOSPITAL ENCOUNTER (OUTPATIENT)
Dept: PHYSICAL THERAPY | Facility: CLINIC | Age: 70
Setting detail: THERAPIES SERIES
Discharge: HOME | End: 2025-03-25
Payer: MEDICARE

## 2025-03-25 NOTE — FLOWSHEET NOTE
[] [facility]  Outpatient Rehabilitation &  Therapy  [address]  [phone]  [phone] [] [facility]  Outpatient Rehabilitation &  Therapy  [address]  P: [phone]  F: [phone] [] [facility]  Outpatient Rehabilitation &  Therapy  [address]  P: [phone]  F: [phone] [] [facility]  Outpatient Rehabilitation &  Therapy  [address]  [phone]  [phone] [x] [facility]  Outpatient Rehabilitation &  Therapy  [address]   P: [phone]  F: [phone]  [] [facility]  Outpatient Rehabilitation &  Therapy  [address]  P: [phone]  F: [phone] [] [facility]  Outpatient Rehabilitation &  Therapy  [address]  P: [phone]  F: [phone] [] [facility]  Outpatient Rehabilitation &  Therapy  [address]  P: [phone]  F: [phone] [] [facility]  Outpatient Rehabilitation &  Therapy  [address]  P: [phone]  F: [phone]  [] [facility] Outpatient Rehabilitation &  Therapy  [address]  P: [phone]  F: [phone]     Therapy Cancel/No Show note    Date: 3/25/2025  Patient: Kodak Arnett  : 1955  MRN: [id number]    Cancels/No Shows to date: 0    For today's appointment patient:    [x]  Cancelled    [] Rescheduled appointment    [] No-show     Reason given by patient:    []  Patient ill    []  Conflicting appointment    [] No transportation      [] Conflict with work    [] No reason given    [] Weather related    [] COVID-19    [] Other:      Comments: Patient is going to pain management and neuro and wants to get that figured out before continuing PT       [] Next appointment was confirmed    Electronically signed by: Rochelle Watts

## 2025-03-28 ENCOUNTER — APPOINTMENT (OUTPATIENT)
Dept: PHYSICAL THERAPY | Facility: CLINIC | Age: 70
End: 2025-03-28
Payer: MEDICARE

## 2025-04-04 ENCOUNTER — HOSPITAL ENCOUNTER (OUTPATIENT)
Dept: PHYSICAL THERAPY | Facility: CLINIC | Age: 70
Setting detail: THERAPIES SERIES
Discharge: HOME | End: 2025-04-04

## 2025-04-17 NOTE — TELEPHONE ENCOUNTER
Kodak Arnett is calling to request a refill on the following medication(s):    Medication Request:  Requested Prescriptions     Pending Prescriptions Disp Refills    tirzepatide-weight management (ZEPBOUND) 2.5 MG/0.5ML SOAJ subCUTAneous auto-injector pen 2 mL 0     Sig: Inject 2.5 mg into the skin every 7 days       Last Visit Date (If Applicable):  2/25/2025    Next Visit Date:    6/30/2025

## 2025-05-19 ENCOUNTER — TELEPHONE (OUTPATIENT)
Dept: FAMILY MEDICINE CLINIC | Age: 70
End: 2025-05-19

## 2025-05-19 NOTE — TELEPHONE ENCOUNTER
[facility] pharmacy is calling to ask if patient is supposed to be on Monjoura and Tradjenta as there is no benefit to be on both. This was addressed to Dr Boyle and she stated the patient is only supposed to be on Mounjaro. Pharmacy notified.

## 2025-05-27 ENCOUNTER — HOSPITAL ENCOUNTER (OUTPATIENT)
Facility: CLINIC | Age: 70
Setting detail: SPECIMEN
Discharge: HOME | End: 2025-05-27
Payer: MEDICARE

## 2025-05-27 PROCEDURE — 83993 ASSAY FOR CALPROTECTIN FECAL: CPT

## 2025-05-27 PROCEDURE — 87493 C DIFF AMPLIFIED PROBE: CPT

## 2025-05-27 PROCEDURE — 82653 EL-1 FECAL QUANTITATIVE: CPT

## 2025-05-27 PROCEDURE — 82705 FATS/LIPIDS FECES QUAL: CPT

## 2025-05-27 PROCEDURE — 87506 IADNA-DNA/RNA PROBE TQ 6-11: CPT

## 2025-05-28 LAB
C DIFFICILE TOXINS, PCR: NORMAL
CALPROTECTIN, FECAL: 111 UG/G
CAMPYLOBACTER DNA SPEC NAA+PROBE: NORMAL
ETEC ELTA+ESTB GENES STL QL NAA+PROBE: NORMAL
P SHIGELLOIDES DNA STL QL NAA+PROBE: NORMAL
SALMONELLA DNA SPEC QL NAA+PROBE: NORMAL
SHIGA TOXIN STX GENE SPEC NAA+PROBE: NORMAL
SHIGELLA DNA SPEC QL NAA+PROBE: NORMAL
SPECIMEN DESCRIPTION: NORMAL
SPECIMEN DESCRIPTION: NORMAL
V CHOL+PARA RFBL+TRKH+TNAA STL QL NAA+PR: NORMAL
Y ENTERO RECN STL QL NAA+PROBE: NORMAL

## 2025-05-29 LAB — FECAL PANCREATIC ELASTASE-1: 541 UG/G

## 2025-05-30 LAB
FAT QUALITATIVE SPLIT STOOL: NORMAL
FECAL NEUTRAL FAT: NORMAL

## 2025-06-09 RX ORDER — IBUPROFEN 800 MG/1
TABLET, FILM COATED ORAL
Qty: 60 TABLET | Refills: 0 | OUTPATIENT
Start: 2025-06-09

## 2025-07-02 PROBLEM — N18.32 STAGE 3B CHRONIC KIDNEY DISEASE (HCC): Status: ACTIVE | Noted: 2025-07-02

## 2025-07-02 NOTE — PROGRESS NOTES
[facility]     Date of Visit:  2025  Patient Name: Kodak Arnett   Patient :  1955     CHIEF COMPLAINT:     Kodak Arnett is a 69 y.o. female who presents today for an general visit to be evaluated for the following condition(s):  Chief Complaint   Patient presents with    Establish Care       HISTORY OF PRESENT ILLNESS:       HPI     Here today to establish care with new provider, previous provider Dr. Boyle at [facility].     A1c today is 5.6%, previously 5.9% in 2024.  She is on Mounjaro, which is working well. Reports not noticing much weight loss benefits after 2 months of injections.  She has not increased dose since starting medication.      Follow with nephrology, Dr. Medeiros at [facility] for history or renal cell carcinoma and stage 3 CKD.      Also follows with cardiology, Dr. Lewis.    REVIEW OF SYSTEMS:      Review of Systems   Constitutional: Negative.  Negative for activity change and appetite change.   HENT: Negative.  Negative for ear pain, hearing loss and tinnitus.    Eyes: Negative.  Negative for pain and visual disturbance.   Respiratory: Negative.  Negative for chest tightness and shortness of breath.    Cardiovascular: Negative.  Negative for chest pain.   Gastrointestinal: Negative.  Negative for abdominal pain.   Endocrine: Negative.    Genitourinary: Negative.  Negative for dysuria, frequency and urgency.   Musculoskeletal: Negative.  Negative for arthralgias and myalgias.   Skin: Negative.    Allergic/Immunologic: Negative.    Neurological: Negative.  Negative for dizziness and light-headedness.   Hematological: Negative.    Psychiatric/Behavioral: Negative.  Negative for agitation and hallucinations.    All other systems reviewed and are negative.       REVIEWED INFORMATION      Current Outpatient Medications   Medication Sig Dispense Refill    hydroCHLOROthiazide 12.5 MG tablet       hyoscyamine (ANASPAZ;LEVSIN)

## 2025-07-04 SDOH — HEALTH STABILITY: PHYSICAL HEALTH: ON AVERAGE, HOW MANY MINUTES DO YOU ENGAGE IN EXERCISE AT THIS LEVEL?: 10 MIN

## 2025-07-04 SDOH — HEALTH STABILITY: PHYSICAL HEALTH: ON AVERAGE, HOW MANY DAYS PER WEEK DO YOU ENGAGE IN MODERATE TO STRENUOUS EXERCISE (LIKE A BRISK WALK)?: 0 DAYS

## 2025-07-07 ENCOUNTER — OFFICE VISIT (OUTPATIENT)
Dept: FAMILY MEDICINE CLINIC | Age: 70
End: 2025-07-07

## 2025-07-07 VITALS
OXYGEN SATURATION: 99 % | DIASTOLIC BLOOD PRESSURE: 78 MMHG | HEART RATE: 89 BPM | WEIGHT: 201 LBS | SYSTOLIC BLOOD PRESSURE: 112 MMHG | BODY MASS INDEX: 35.61 KG/M2 | TEMPERATURE: 97.2 F

## 2025-07-07 DIAGNOSIS — E11.22 TYPE 2 DIABETES MELLITUS WITH STAGE 3A CHRONIC KIDNEY DISEASE, WITHOUT LONG-TERM CURRENT USE OF INSULIN (HCC): ICD-10-CM

## 2025-07-07 DIAGNOSIS — I10 PRIMARY HYPERTENSION: ICD-10-CM

## 2025-07-07 DIAGNOSIS — N18.31 TYPE 2 DIABETES MELLITUS WITH STAGE 3A CHRONIC KIDNEY DISEASE, WITHOUT LONG-TERM CURRENT USE OF INSULIN (HCC): ICD-10-CM

## 2025-07-07 DIAGNOSIS — N18.32 STAGE 3B CHRONIC KIDNEY DISEASE (HCC): ICD-10-CM

## 2025-07-07 DIAGNOSIS — Z76.89 ENCOUNTER TO ESTABLISH CARE: Primary | ICD-10-CM

## 2025-07-07 LAB — HBA1C MFR BLD: 5.6 %

## 2025-07-07 RX ORDER — HYDROCHLOROTHIAZIDE 12.5 MG/1
TABLET ORAL
COMMUNITY
Start: 2025-04-30

## 2025-07-07 RX ORDER — HYOSCYAMINE SULFATE 0.12 MG/1
0.12 TABLET ORAL EVERY 4 HOURS PRN
COMMUNITY

## 2025-07-07 RX ORDER — AMLODIPINE BESYLATE 2.5 MG/1
2.5 TABLET ORAL DAILY
Qty: 30 TABLET | Refills: 0 | Status: SHIPPED | OUTPATIENT
Start: 2025-07-07

## 2025-07-07 NOTE — TELEPHONE ENCOUNTER
Kodak Arnett is calling to request a refill on the following medication(s):    Medication Request:  Requested Prescriptions     Pending Prescriptions Disp Refills    amLODIPine (NORVASC) 2.5 MG tablet [Pharmacy Med Name: amLODIPine Besylate Oral Tablet 2.5 MG] 30 tablet 0     Sig: Take 1 tablet by mouth daily       Last Visit Date (If Applicable):  1/21/2025    Next Visit Date:    7/7/2025

## 2025-07-18 NOTE — DISCHARGE SUMMARY
[x] [facility]  Outpatient Rehabilitation &  Therapy  [address]  [phone]  [phone] [] [facility]  Outpatient Rehabilitation &  Therapy  [address]  P: [phone]  F: [phone] [] [facility]  Outpatient Rehabilitation &  Therapy  [address]  P: [phone]  F: [phone] [] [facility]  Outpatient Rehabilitation &  Therapy  [address]  [phone]  [phone] [x] [facility]  Outpatient Rehabilitation &  Therapy  [address]   P: [phone]  F: [phone]  [] [facility]  Outpatient Rehabilitation &  Therapy  [address]  P: [phone]  F: [phone] [] [facility]  Outpatient Rehabilitation &  Therapy  [address]  P: [phone]  F: [phone] [] [facility]  Outpatient Rehabilitation &  Therapy  [address]  P: [phone]  F: [phone] [] [facility]  Outpatient Rehabilitation &  Therapy  [address]  P: [phone]  F: [phone]  [] [facility] Outpatient Rehabilitation &  Therapy  [address]  P: [phone]  F: [phone] [] [facility] Outpatient Rehabilitation &  Therapy  [address]  P: [phone]   F: [phone]            Physical Therapy Discharge Note    Date: 2025      Patient: Kodak Arnett  : 1955  MRN: [id number] Physician: Dr. Montana Morin             Insurance: Medicare (secondary AARP,  BMN)  Medical Diagnosis: Acute midline low back pain without sciatica (M54.50)             Rehab Codes: M62.81 , R26.89 , M25.651 , M25.652 , M25.552 , M54.5 , M62.830  Onset Date: 25                                         Next 's appt.: 25  Visit# / total visits: ; Progress note for

## 2025-07-31 DIAGNOSIS — E11.22 TYPE 2 DIABETES MELLITUS WITH STAGE 3A CHRONIC KIDNEY DISEASE, WITHOUT LONG-TERM CURRENT USE OF INSULIN (HCC): ICD-10-CM

## 2025-07-31 DIAGNOSIS — N18.31 TYPE 2 DIABETES MELLITUS WITH STAGE 3A CHRONIC KIDNEY DISEASE, WITHOUT LONG-TERM CURRENT USE OF INSULIN (HCC): ICD-10-CM

## 2025-08-01 RX ORDER — TIRZEPATIDE 5 MG/.5ML
INJECTION, SOLUTION SUBCUTANEOUS
Qty: 2 ML | Refills: 0 | Status: SHIPPED | OUTPATIENT
Start: 2025-08-01

## 2025-08-01 NOTE — TELEPHONE ENCOUNTER
Kodak Arnett is calling to request a refill on the following medication(s):    Medication Request:  Requested Prescriptions     Pending Prescriptions Disp Refills    MOUNJARO 5 MG/0.5ML SOAJ pen [Pharmacy Med Name: Mounjaro Subcutaneous Solution Auto-injector 5 MG/0.5ML] 2 mL 0     Sig: Inject 5 mg under the skin every 7 days       Last Visit Date (If Applicable):  7/7/2025    Next Visit Date:    12/1/2025

## 2025-08-02 ENCOUNTER — HOSPITAL ENCOUNTER (OUTPATIENT)
Dept: MAMMOGRAPHY | Age: 70
End: 2025-08-02
Payer: MEDICARE

## 2025-08-02 DIAGNOSIS — Z12.39 BREAST SCREENING: ICD-10-CM

## 2025-08-02 PROCEDURE — 77063 BREAST TOMOSYNTHESIS BI: CPT

## 2025-08-07 ENCOUNTER — TELEPHONE (OUTPATIENT)
Dept: FAMILY MEDICINE CLINIC | Age: 70
End: 2025-08-07

## 2025-08-07 DIAGNOSIS — E04.1 THYROID NODULE: Primary | ICD-10-CM

## 2025-08-11 ENCOUNTER — HOSPITAL ENCOUNTER (OUTPATIENT)
Dept: ULTRASOUND IMAGING | Facility: CLINIC | Age: 70
Discharge: HOME | End: 2025-08-13
Payer: MEDICARE

## 2025-08-11 DIAGNOSIS — E04.1 THYROID NODULE: ICD-10-CM

## 2025-08-11 PROCEDURE — 76536 US EXAM OF HEAD AND NECK: CPT

## 2025-08-25 ENCOUNTER — HOSPITAL ENCOUNTER (OUTPATIENT)
Dept: LAB | Facility: CLINIC | Age: 70
Discharge: HOME | End: 2025-08-25
Payer: MEDICARE

## 2025-08-25 DIAGNOSIS — E11.22 TYPE 2 DIABETES MELLITUS WITH STAGE 3A CHRONIC KIDNEY DISEASE, WITHOUT LONG-TERM CURRENT USE OF INSULIN (HCC): ICD-10-CM

## 2025-08-25 DIAGNOSIS — N18.31 TYPE 2 DIABETES MELLITUS WITH STAGE 3A CHRONIC KIDNEY DISEASE, WITHOUT LONG-TERM CURRENT USE OF INSULIN (HCC): ICD-10-CM

## 2025-08-25 LAB
ALBUMIN SERPL-MCNC: 4.5 G/DL (ref 3.5–5.2)
ALBUMIN/GLOB SERPL: 1.8 {RATIO} (ref 1–2.5)
ALP SERPL-CCNC: 61 U/L (ref 35–104)
ALT SERPL-CCNC: 23 U/L (ref 10–35)
ANION GAP SERPL CALCULATED.3IONS-SCNC: 11 MMOL/L (ref 9–16)
AST SERPL-CCNC: 22 U/L (ref 10–35)
BILIRUB SERPL-MCNC: 0.6 MG/DL (ref 0–1.2)
BUN SERPL-MCNC: 33 MG/DL (ref 8–23)
CALCIUM SERPL-MCNC: 9.7 MG/DL (ref 8.6–10.4)
CHLORIDE SERPL-SCNC: 104 MMOL/L (ref 98–107)
CHOLEST SERPL-MCNC: 97 MG/DL (ref 0–199)
CHOLESTEROL/HDL RATIO: 2.9
CK SERPL-CCNC: 32 U/L (ref 26–192)
CO2 SERPL-SCNC: 24 MMOL/L (ref 20–31)
CREAT SERPL-MCNC: 1.8 MG/DL (ref 0.6–0.9)
GFR, ESTIMATED: 30 ML/MIN/1.73M2
GLUCOSE P FAST SERPL-MCNC: 88 MG/DL (ref 74–99)
HDLC SERPL-MCNC: 34 MG/DL
LDLC SERPL CALC-MCNC: 30 MG/DL (ref 0–100)
POTASSIUM SERPL-SCNC: 5.2 MMOL/L (ref 3.7–5.3)
PROT SERPL-MCNC: 7 G/DL (ref 6.6–8.7)
SODIUM SERPL-SCNC: 139 MMOL/L (ref 136–145)
TRIGL SERPL-MCNC: 166 MG/DL (ref 0–149)
VLDLC SERPL CALC-MCNC: 33 MG/DL (ref 1–30)

## 2025-08-25 PROCEDURE — 82550 ASSAY OF CK (CPK): CPT

## 2025-08-25 PROCEDURE — 36415 COLL VENOUS BLD VENIPUNCTURE: CPT

## 2025-08-25 PROCEDURE — 80061 LIPID PANEL: CPT

## 2025-08-25 PROCEDURE — 80053 COMPREHEN METABOLIC PANEL: CPT

## 2025-08-26 RX ORDER — TIRZEPATIDE 5 MG/.5ML
5 INJECTION, SOLUTION SUBCUTANEOUS WEEKLY
Qty: 2 ML | Refills: 1 | Status: SHIPPED | OUTPATIENT
Start: 2025-08-26

## 2025-09-03 RX ORDER — ATENOLOL 50 MG/1
50 TABLET ORAL 2 TIMES DAILY
Qty: 60 TABLET | Refills: 0 | Status: SHIPPED | OUTPATIENT
Start: 2025-09-03

## (undated) DEVICE — FORCEPS BX L240CM WRK CHN 2.8MM STD CAP W/ NDL MIC MESH

## (undated) DEVICE — BRAVO CF CAPSULE  DELIVERY DEV, 5-PK: Brand: BRAVO